# Patient Record
Sex: MALE | Race: WHITE | Employment: OTHER | URBAN - METROPOLITAN AREA
[De-identification: names, ages, dates, MRNs, and addresses within clinical notes are randomized per-mention and may not be internally consistent; named-entity substitution may affect disease eponyms.]

---

## 2017-01-04 RX ORDER — METOPROLOL SUCCINATE 25 MG/1
25 TABLET, EXTENDED RELEASE ORAL DAILY
Qty: 90 TABLET | Refills: 3 | Status: SHIPPED | OUTPATIENT
Start: 2017-01-04 | End: 2018-01-01

## 2017-01-04 RX ORDER — ATORVASTATIN CALCIUM 10 MG/1
10 TABLET, FILM COATED ORAL DAILY
Qty: 90 TABLET | Refills: 3 | Status: SHIPPED | OUTPATIENT
Start: 2017-01-04 | End: 2018-01-01

## 2017-03-16 ENCOUNTER — OFFICE VISIT (OUTPATIENT)
Dept: FAMILY MEDICINE CLINIC | Age: 67
End: 2017-03-16
Payer: MEDICARE

## 2017-03-16 VITALS — WEIGHT: 232 LBS | DIASTOLIC BLOOD PRESSURE: 90 MMHG | BODY MASS INDEX: 29.79 KG/M2 | SYSTOLIC BLOOD PRESSURE: 124 MMHG

## 2017-03-16 DIAGNOSIS — I10 ESSENTIAL HYPERTENSION: Primary | ICD-10-CM

## 2017-03-16 DIAGNOSIS — E78.00 PURE HYPERCHOLESTEROLEMIA: ICD-10-CM

## 2017-03-16 DIAGNOSIS — I25.83 CORONARY ARTERY DISEASE DUE TO LIPID RICH PLAQUE: ICD-10-CM

## 2017-03-16 DIAGNOSIS — I25.10 CORONARY ARTERY DISEASE DUE TO LIPID RICH PLAQUE: ICD-10-CM

## 2017-03-16 PROCEDURE — 1036F TOBACCO NON-USER: CPT | Performed by: FAMILY MEDICINE

## 2017-03-16 PROCEDURE — 3017F COLORECTAL CA SCREEN DOC REV: CPT | Performed by: FAMILY MEDICINE

## 2017-03-16 PROCEDURE — G8420 CALC BMI NORM PARAMETERS: HCPCS | Performed by: FAMILY MEDICINE

## 2017-03-16 PROCEDURE — G8598 ASA/ANTIPLAT THER USED: HCPCS | Performed by: FAMILY MEDICINE

## 2017-03-16 PROCEDURE — G8427 DOCREV CUR MEDS BY ELIG CLIN: HCPCS | Performed by: FAMILY MEDICINE

## 2017-03-16 PROCEDURE — 1123F ACP DISCUSS/DSCN MKR DOCD: CPT | Performed by: FAMILY MEDICINE

## 2017-03-16 PROCEDURE — 4040F PNEUMOC VAC/ADMIN/RCVD: CPT | Performed by: FAMILY MEDICINE

## 2017-03-16 PROCEDURE — 99214 OFFICE O/P EST MOD 30 MIN: CPT | Performed by: FAMILY MEDICINE

## 2017-03-16 PROCEDURE — G8484 FLU IMMUNIZE NO ADMIN: HCPCS | Performed by: FAMILY MEDICINE

## 2017-03-16 RX ORDER — ACETAMINOPHEN 160 MG
2000 TABLET,DISINTEGRATING ORAL DAILY
COMMUNITY

## 2017-03-16 ASSESSMENT — ENCOUNTER SYMPTOMS
DIARRHEA: 0
ABDOMINAL PAIN: 0
TROUBLE SWALLOWING: 0
NAUSEA: 0
FACIAL SWELLING: 0
SHORTNESS OF BREATH: 0
CHEST TIGHTNESS: 0
BLOOD IN STOOL: 0
VOMITING: 0
EYE DISCHARGE: 0
CONSTIPATION: 0
EYE REDNESS: 0
COUGH: 0

## 2017-04-19 ENCOUNTER — HOSPITAL ENCOUNTER (OUTPATIENT)
Dept: GENERAL RADIOLOGY | Age: 67
Discharge: HOME | End: 2017-04-19
Payer: MEDICARE

## 2017-04-19 ENCOUNTER — HOSPITAL ENCOUNTER (OUTPATIENT)
Age: 67
Discharge: HOME | End: 2017-04-19
Payer: MEDICARE

## 2017-04-19 DIAGNOSIS — I25.83 CORONARY ARTERY DISEASE DUE TO LIPID RICH PLAQUE: ICD-10-CM

## 2017-04-19 DIAGNOSIS — E78.5 HYPERLIPIDEMIA: ICD-10-CM

## 2017-04-19 DIAGNOSIS — Z95.1 S/P CABG X 2: ICD-10-CM

## 2017-04-19 DIAGNOSIS — I10 ESSENTIAL HYPERTENSION: ICD-10-CM

## 2017-04-19 DIAGNOSIS — I25.10 CORONARY ARTERY DISEASE DUE TO LIPID RICH PLAQUE: ICD-10-CM

## 2017-04-19 DIAGNOSIS — E55.9 VITAMIN D DEFICIENCY: ICD-10-CM

## 2017-04-19 DIAGNOSIS — Z98.890 H/O CAROTID ENDARTERECTOMY: ICD-10-CM

## 2017-04-19 LAB
ABSOLUTE EOS #: 0.2 K/UL (ref 0–0.4)
ABSOLUTE LYMPH #: 1.9 K/UL (ref 0.9–2.5)
ABSOLUTE MONO #: 0.5 K/UL (ref 0–1)
ALBUMIN SERPL-MCNC: 4.1 G/DL (ref 3.5–5.2)
ALBUMIN/GLOBULIN RATIO: ABNORMAL (ref 1–2.5)
ALP BLD-CCNC: 54 U/L (ref 40–129)
ALT SERPL-CCNC: 21 U/L (ref 5–41)
ANION GAP SERPL CALCULATED.3IONS-SCNC: 13 MMOL/L (ref 9–17)
AST SERPL-CCNC: 18 U/L
BASOPHILS # BLD: 0 % (ref 0–2)
BASOPHILS ABSOLUTE: 0 K/UL (ref 0–0.2)
BILIRUB SERPL-MCNC: 0.6 MG/DL (ref 0.3–1.2)
BUN BLDV-MCNC: 17 MG/DL (ref 8–23)
BUN/CREAT BLD: 19 (ref 9–20)
CALCIUM SERPL-MCNC: 9.1 MG/DL (ref 8.6–10.4)
CHLORIDE BLD-SCNC: 104 MMOL/L (ref 98–107)
CHOLESTEROL/HDL RATIO: 4.4
CHOLESTEROL: 208 MG/DL
CO2: 24 MMOL/L (ref 20–31)
CREAT SERPL-MCNC: 0.89 MG/DL (ref 0.7–1.2)
DIFFERENTIAL TYPE: YES
EOSINOPHILS RELATIVE PERCENT: 3 % (ref 0–5)
GFR AFRICAN AMERICAN: >60 ML/MIN
GFR NON-AFRICAN AMERICAN: >60 ML/MIN
GFR SERPL CREATININE-BSD FRML MDRD: ABNORMAL ML/MIN/{1.73_M2}
GFR SERPL CREATININE-BSD FRML MDRD: ABNORMAL ML/MIN/{1.73_M2}
GLUCOSE BLD-MCNC: 101 MG/DL (ref 70–99)
HCT VFR BLD CALC: 46.1 % (ref 41–53)
HDLC SERPL-MCNC: 47 MG/DL
HEMOGLOBIN: 15.8 G/DL (ref 13.5–17.5)
LDL CHOLESTEROL: 126 MG/DL (ref 0–130)
LYMPHOCYTES # BLD: 30 % (ref 13–44)
MCH RBC QN AUTO: 30.9 PG (ref 26–34)
MCHC RBC AUTO-ENTMCNC: 34.2 G/DL (ref 31–37)
MCV RBC AUTO: 90.4 FL (ref 80–100)
MONOCYTES # BLD: 7 % (ref 5–9)
PATIENT FASTING?: YES
PDW BLD-RTO: 12.8 % (ref 12.1–15.2)
PLATELET # BLD: 161 K/UL (ref 140–450)
PLATELET ESTIMATE: NORMAL
PMV BLD AUTO: NORMAL FL (ref 6–12)
POTASSIUM SERPL-SCNC: 4.3 MMOL/L (ref 3.7–5.3)
RBC # BLD: 5.1 M/UL (ref 4.5–5.9)
RBC # BLD: NORMAL 10*6/UL
SEG NEUTROPHILS: 60 % (ref 39–75)
SEGMENTED NEUTROPHILS ABSOLUTE COUNT: 3.8 K/UL (ref 2.1–6.5)
SODIUM BLD-SCNC: 141 MMOL/L (ref 135–144)
TOTAL PROTEIN: 6.9 G/DL (ref 6.4–8.3)
TRIGL SERPL-MCNC: 173 MG/DL
TSH SERPL DL<=0.05 MIU/L-ACNC: 0.56 MIU/L (ref 0.3–5)
VITAMIN D 25-HYDROXY: 40.3 NG/ML (ref 30–100)
VLDLC SERPL CALC-MCNC: ABNORMAL MG/DL (ref 1–30)
WBC # BLD: 6.3 K/UL (ref 3.5–11)
WBC # BLD: NORMAL 10*3/UL

## 2017-04-19 PROCEDURE — 93005 ELECTROCARDIOGRAM TRACING: CPT

## 2017-04-19 PROCEDURE — 80061 LIPID PANEL: CPT

## 2017-04-19 PROCEDURE — 80053 COMPREHEN METABOLIC PANEL: CPT

## 2017-04-19 PROCEDURE — 85025 COMPLETE CBC W/AUTO DIFF WBC: CPT

## 2017-04-19 PROCEDURE — 82306 VITAMIN D 25 HYDROXY: CPT

## 2017-04-19 PROCEDURE — 36415 COLL VENOUS BLD VENIPUNCTURE: CPT

## 2017-04-19 PROCEDURE — 71020 XR CHEST STANDARD TWO VW: CPT

## 2017-04-19 PROCEDURE — 84443 ASSAY THYROID STIM HORMONE: CPT

## 2017-04-20 LAB
EKG ATRIAL RATE: 70 BPM
EKG P AXIS: 68 DEGREES
EKG P-R INTERVAL: 142 MS
EKG Q-T INTERVAL: 424 MS
EKG QRS DURATION: 112 MS
EKG QTC CALCULATION (BAZETT): 457 MS
EKG R AXIS: 4 DEGREES
EKG T AXIS: 56 DEGREES
EKG VENTRICULAR RATE: 70 BPM

## 2017-04-24 ENCOUNTER — OFFICE VISIT (OUTPATIENT)
Dept: CARDIOLOGY CLINIC | Age: 67
End: 2017-04-24
Payer: MEDICARE

## 2017-04-24 VITALS
SYSTOLIC BLOOD PRESSURE: 180 MMHG | BODY MASS INDEX: 29.66 KG/M2 | OXYGEN SATURATION: 99 % | WEIGHT: 231 LBS | HEART RATE: 86 BPM | DIASTOLIC BLOOD PRESSURE: 106 MMHG

## 2017-04-24 DIAGNOSIS — I25.83 CORONARY ARTERY DISEASE DUE TO LIPID RICH PLAQUE: Primary | ICD-10-CM

## 2017-04-24 DIAGNOSIS — I25.10 CORONARY ARTERY DISEASE DUE TO LIPID RICH PLAQUE: Primary | ICD-10-CM

## 2017-04-24 DIAGNOSIS — Z98.890 H/O CAROTID ENDARTERECTOMY: ICD-10-CM

## 2017-04-24 DIAGNOSIS — E78.00 PURE HYPERCHOLESTEROLEMIA: ICD-10-CM

## 2017-04-24 DIAGNOSIS — E55.9 VITAMIN D DEFICIENCY: ICD-10-CM

## 2017-04-24 PROCEDURE — G8598 ASA/ANTIPLAT THER USED: HCPCS | Performed by: INTERNAL MEDICINE

## 2017-04-24 PROCEDURE — 3017F COLORECTAL CA SCREEN DOC REV: CPT | Performed by: INTERNAL MEDICINE

## 2017-04-24 PROCEDURE — G8420 CALC BMI NORM PARAMETERS: HCPCS | Performed by: INTERNAL MEDICINE

## 2017-04-24 PROCEDURE — 1036F TOBACCO NON-USER: CPT | Performed by: INTERNAL MEDICINE

## 2017-04-24 PROCEDURE — 4040F PNEUMOC VAC/ADMIN/RCVD: CPT | Performed by: INTERNAL MEDICINE

## 2017-04-24 PROCEDURE — 1123F ACP DISCUSS/DSCN MKR DOCD: CPT | Performed by: INTERNAL MEDICINE

## 2017-04-24 PROCEDURE — G8427 DOCREV CUR MEDS BY ELIG CLIN: HCPCS | Performed by: INTERNAL MEDICINE

## 2017-04-24 PROCEDURE — 99214 OFFICE O/P EST MOD 30 MIN: CPT | Performed by: INTERNAL MEDICINE

## 2017-04-24 PROCEDURE — 93308 TTE F-UP OR LMTD: CPT | Performed by: INTERNAL MEDICINE

## 2017-04-26 ENCOUNTER — OFFICE VISIT (OUTPATIENT)
Dept: FAMILY MEDICINE CLINIC | Age: 67
End: 2017-04-26
Payer: MEDICARE

## 2017-04-26 VITALS — DIASTOLIC BLOOD PRESSURE: 90 MMHG | SYSTOLIC BLOOD PRESSURE: 146 MMHG | BODY MASS INDEX: 29.66 KG/M2 | WEIGHT: 231 LBS

## 2017-04-26 DIAGNOSIS — M25.572 CHRONIC PAIN OF LEFT ANKLE: Primary | ICD-10-CM

## 2017-04-26 DIAGNOSIS — G89.29 CHRONIC PAIN OF LEFT ANKLE: Primary | ICD-10-CM

## 2017-04-26 PROCEDURE — 1036F TOBACCO NON-USER: CPT | Performed by: FAMILY MEDICINE

## 2017-04-26 PROCEDURE — G8420 CALC BMI NORM PARAMETERS: HCPCS | Performed by: FAMILY MEDICINE

## 2017-04-26 PROCEDURE — 3017F COLORECTAL CA SCREEN DOC REV: CPT | Performed by: FAMILY MEDICINE

## 2017-04-26 PROCEDURE — G8598 ASA/ANTIPLAT THER USED: HCPCS | Performed by: FAMILY MEDICINE

## 2017-04-26 PROCEDURE — 4040F PNEUMOC VAC/ADMIN/RCVD: CPT | Performed by: FAMILY MEDICINE

## 2017-04-26 PROCEDURE — 99213 OFFICE O/P EST LOW 20 MIN: CPT | Performed by: FAMILY MEDICINE

## 2017-04-26 PROCEDURE — G8427 DOCREV CUR MEDS BY ELIG CLIN: HCPCS | Performed by: FAMILY MEDICINE

## 2017-04-26 PROCEDURE — 1123F ACP DISCUSS/DSCN MKR DOCD: CPT | Performed by: FAMILY MEDICINE

## 2017-09-20 ENCOUNTER — HOSPITAL ENCOUNTER (OUTPATIENT)
Dept: GENERAL RADIOLOGY | Age: 67
Discharge: HOME | End: 2017-09-20
Payer: MEDICARE

## 2017-09-20 ENCOUNTER — OFFICE VISIT (OUTPATIENT)
Dept: FAMILY MEDICINE CLINIC | Age: 67
End: 2017-09-20
Payer: MEDICARE

## 2017-09-20 ENCOUNTER — HOSPITAL ENCOUNTER (OUTPATIENT)
Age: 67
Discharge: HOME | End: 2017-09-20
Payer: MEDICARE

## 2017-09-20 VITALS — WEIGHT: 226 LBS | SYSTOLIC BLOOD PRESSURE: 134 MMHG | DIASTOLIC BLOOD PRESSURE: 100 MMHG | BODY MASS INDEX: 29.02 KG/M2

## 2017-09-20 DIAGNOSIS — E78.00 PURE HYPERCHOLESTEROLEMIA: ICD-10-CM

## 2017-09-20 DIAGNOSIS — M25.532 LEFT WRIST PAIN: ICD-10-CM

## 2017-09-20 DIAGNOSIS — I25.83 CORONARY ARTERY DISEASE DUE TO LIPID RICH PLAQUE: ICD-10-CM

## 2017-09-20 DIAGNOSIS — I25.10 CORONARY ARTERY DISEASE DUE TO LIPID RICH PLAQUE: ICD-10-CM

## 2017-09-20 DIAGNOSIS — I10 ESSENTIAL HYPERTENSION: Primary | ICD-10-CM

## 2017-09-20 PROCEDURE — G8598 ASA/ANTIPLAT THER USED: HCPCS | Performed by: FAMILY MEDICINE

## 2017-09-20 PROCEDURE — 99214 OFFICE O/P EST MOD 30 MIN: CPT | Performed by: FAMILY MEDICINE

## 2017-09-20 PROCEDURE — 73110 X-RAY EXAM OF WRIST: CPT

## 2017-09-20 PROCEDURE — 4040F PNEUMOC VAC/ADMIN/RCVD: CPT | Performed by: FAMILY MEDICINE

## 2017-09-20 PROCEDURE — G8427 DOCREV CUR MEDS BY ELIG CLIN: HCPCS | Performed by: FAMILY MEDICINE

## 2017-09-20 PROCEDURE — 1036F TOBACCO NON-USER: CPT | Performed by: FAMILY MEDICINE

## 2017-09-20 PROCEDURE — 3017F COLORECTAL CA SCREEN DOC REV: CPT | Performed by: FAMILY MEDICINE

## 2017-09-20 PROCEDURE — G8417 CALC BMI ABV UP PARAM F/U: HCPCS | Performed by: FAMILY MEDICINE

## 2017-09-20 PROCEDURE — 1123F ACP DISCUSS/DSCN MKR DOCD: CPT | Performed by: FAMILY MEDICINE

## 2017-09-20 ASSESSMENT — ENCOUNTER SYMPTOMS
DIARRHEA: 0
EYE REDNESS: 0
CHEST TIGHTNESS: 0
TROUBLE SWALLOWING: 0
EYE DISCHARGE: 0
CONSTIPATION: 0
FACIAL SWELLING: 0
ABDOMINAL PAIN: 0
BLOOD IN STOOL: 0
VOMITING: 0
COUGH: 0
SHORTNESS OF BREATH: 0
NAUSEA: 0

## 2017-09-20 ASSESSMENT — PATIENT HEALTH QUESTIONNAIRE - PHQ9
1. LITTLE INTEREST OR PLEASURE IN DOING THINGS: 0
SUM OF ALL RESPONSES TO PHQ QUESTIONS 1-9: 0
SUM OF ALL RESPONSES TO PHQ9 QUESTIONS 1 & 2: 0
2. FEELING DOWN, DEPRESSED OR HOPELESS: 0

## 2017-09-28 ENCOUNTER — HOSPITAL ENCOUNTER (OUTPATIENT)
Age: 67
Discharge: HOME | End: 2017-09-28
Payer: MEDICARE

## 2017-10-05 ENCOUNTER — HOSPITAL ENCOUNTER (OUTPATIENT)
Age: 67
Discharge: HOME | End: 2017-10-05
Payer: COMMERCIAL

## 2018-01-01 ENCOUNTER — APPOINTMENT (OUTPATIENT)
Dept: GENERAL RADIOLOGY | Age: 68
End: 2018-01-01
Payer: MEDICARE

## 2018-01-01 ENCOUNTER — HOSPITAL ENCOUNTER (EMERGENCY)
Age: 68
Discharge: HOME | End: 2018-01-01
Attending: EMERGENCY MEDICINE
Payer: MEDICARE

## 2018-01-01 VITALS
TEMPERATURE: 97.2 F | WEIGHT: 220 LBS | SYSTOLIC BLOOD PRESSURE: 157 MMHG | BODY MASS INDEX: 28.25 KG/M2 | OXYGEN SATURATION: 95 % | DIASTOLIC BLOOD PRESSURE: 118 MMHG | HEART RATE: 101 BPM | RESPIRATION RATE: 24 BRPM

## 2018-01-01 DIAGNOSIS — J18.9 PNEUMONIA OF RIGHT LOWER LOBE DUE TO INFECTIOUS ORGANISM: Primary | ICD-10-CM

## 2018-01-01 DIAGNOSIS — R06.02 SHORTNESS OF BREATH: ICD-10-CM

## 2018-01-01 LAB
ABSOLUTE EOS #: 0.1 K/UL (ref 0–0.4)
ABSOLUTE IMMATURE GRANULOCYTE: ABNORMAL K/UL (ref 0–0.3)
ABSOLUTE LYMPH #: 1.8 K/UL (ref 1–4.8)
ABSOLUTE MONO #: 0.7 K/UL (ref 0–1)
ANION GAP SERPL CALCULATED.3IONS-SCNC: 15 MMOL/L (ref 9–17)
BASOPHILS # BLD: 0 % (ref 0–2)
BASOPHILS ABSOLUTE: 0 K/UL (ref 0–0.2)
BUN BLDV-MCNC: 26 MG/DL (ref 8–23)
BUN/CREAT BLD: 21 (ref 9–20)
CALCIUM SERPL-MCNC: 10 MG/DL (ref 8.6–10.4)
CHLORIDE BLD-SCNC: 103 MMOL/L (ref 98–107)
CO2: 26 MMOL/L (ref 20–31)
CREAT SERPL-MCNC: 1.22 MG/DL (ref 0.7–1.2)
DIFFERENTIAL TYPE: YES
EKG ATRIAL RATE: 105 BPM
EKG P AXIS: 66 DEGREES
EKG P-R INTERVAL: 152 MS
EKG Q-T INTERVAL: 378 MS
EKG QRS DURATION: 96 MS
EKG QTC CALCULATION (BAZETT): 499 MS
EKG R AXIS: 46 DEGREES
EKG T AXIS: -91 DEGREES
EKG VENTRICULAR RATE: 105 BPM
EOSINOPHILS RELATIVE PERCENT: 1 % (ref 0–5)
GFR AFRICAN AMERICAN: >60 ML/MIN
GFR NON-AFRICAN AMERICAN: 59 ML/MIN
GFR SERPL CREATININE-BSD FRML MDRD: ABNORMAL ML/MIN/{1.73_M2}
GFR SERPL CREATININE-BSD FRML MDRD: ABNORMAL ML/MIN/{1.73_M2}
GLUCOSE BLD-MCNC: 131 MG/DL (ref 70–99)
HCT VFR BLD CALC: 51.5 % (ref 41–53)
HEMOGLOBIN: 17.2 G/DL (ref 13.5–17.5)
IMMATURE GRANULOCYTES: ABNORMAL %
LYMPHOCYTES # BLD: 19 % (ref 13–44)
MCH RBC QN AUTO: 31.1 PG (ref 26–34)
MCHC RBC AUTO-ENTMCNC: 33.3 G/DL (ref 31–37)
MCV RBC AUTO: 93.4 FL (ref 80–100)
MONOCYTES # BLD: 8 % (ref 5–9)
PDW BLD-RTO: 14 % (ref 12.1–15.2)
PLATELET # BLD: 191 K/UL (ref 140–450)
PLATELET ESTIMATE: ABNORMAL
PMV BLD AUTO: ABNORMAL FL (ref 6–12)
POTASSIUM SERPL-SCNC: 4.2 MMOL/L (ref 3.7–5.3)
RBC # BLD: 5.51 M/UL (ref 4.5–5.9)
RBC # BLD: ABNORMAL 10*6/UL
SEG NEUTROPHILS: 72 % (ref 39–75)
SEGMENTED NEUTROPHILS ABSOLUTE COUNT: 6.9 K/UL (ref 2.1–6.5)
SODIUM BLD-SCNC: 144 MMOL/L (ref 135–144)
TROPONIN INTERP: NORMAL
TROPONIN T: <0.03 NG/ML
WBC # BLD: 9.6 K/UL (ref 3.5–11)
WBC # BLD: ABNORMAL 10*3/UL

## 2018-01-01 PROCEDURE — 96374 THER/PROPH/DIAG INJ IV PUSH: CPT

## 2018-01-01 PROCEDURE — 6370000000 HC RX 637 (ALT 250 FOR IP): Performed by: EMERGENCY MEDICINE

## 2018-01-01 PROCEDURE — 99285 EMERGENCY DEPT VISIT HI MDM: CPT

## 2018-01-01 PROCEDURE — 71045 X-RAY EXAM CHEST 1 VIEW: CPT

## 2018-01-01 PROCEDURE — 85025 COMPLETE CBC W/AUTO DIFF WBC: CPT

## 2018-01-01 PROCEDURE — 6360000002 HC RX W HCPCS: Performed by: EMERGENCY MEDICINE

## 2018-01-01 PROCEDURE — 93005 ELECTROCARDIOGRAM TRACING: CPT

## 2018-01-01 PROCEDURE — 80048 BASIC METABOLIC PNL TOTAL CA: CPT

## 2018-01-01 PROCEDURE — 84484 ASSAY OF TROPONIN QUANT: CPT

## 2018-01-01 PROCEDURE — 2580000003 HC RX 258: Performed by: EMERGENCY MEDICINE

## 2018-01-01 RX ORDER — DOXYCYCLINE 100 MG/1
100 CAPSULE ORAL 2 TIMES DAILY
Qty: 20 CAPSULE | Refills: 0 | Status: SHIPPED | OUTPATIENT
Start: 2018-01-01 | End: 2018-01-17 | Stop reason: ALTCHOICE

## 2018-01-01 RX ORDER — DOXYCYCLINE HYCLATE 100 MG
100 TABLET ORAL ONCE
Status: COMPLETED | OUTPATIENT
Start: 2018-01-01 | End: 2018-01-01

## 2018-01-01 RX ADMIN — DOXYCYCLINE HYCLATE 100 MG: 100 TABLET, COATED ORAL at 17:02

## 2018-01-01 RX ADMIN — CEFTRIAXONE 1 G: 1 INJECTION, POWDER, FOR SOLUTION INTRAMUSCULAR; INTRAVENOUS at 17:06

## 2018-01-01 NOTE — ED NOTES
Good verbal understanding of discharge instructions and scripts. Skin warm and dry. Respirations even and unlabored. Ambulates to [facility] ED exit with steady gait.        Nate Khanna RN  01/01/18 8260

## 2018-01-07 ENCOUNTER — HOSPITAL ENCOUNTER (INPATIENT)
Age: 68
LOS: 2 days | Discharge: HOME | DRG: 291 | End: 2018-01-09
Attending: EMERGENCY MEDICINE | Admitting: INTERNAL MEDICINE
Payer: MEDICARE

## 2018-01-07 ENCOUNTER — APPOINTMENT (OUTPATIENT)
Dept: CT IMAGING | Age: 68
DRG: 291 | End: 2018-01-07
Payer: MEDICARE

## 2018-01-07 ENCOUNTER — APPOINTMENT (OUTPATIENT)
Dept: GENERAL RADIOLOGY | Age: 68
DRG: 291 | End: 2018-01-07
Payer: MEDICARE

## 2018-01-07 DIAGNOSIS — I50.9 ACUTE CONGESTIVE HEART FAILURE, UNSPECIFIED CONGESTIVE HEART FAILURE TYPE: ICD-10-CM

## 2018-01-07 DIAGNOSIS — R06.02 SHORTNESS OF BREATH: ICD-10-CM

## 2018-01-07 DIAGNOSIS — M79.89 LEG SWELLING: Primary | ICD-10-CM

## 2018-01-07 PROBLEM — I50.21 ACUTE SYSTOLIC CONGESTIVE HEART FAILURE (HCC): Status: ACTIVE | Noted: 2018-01-07

## 2018-01-07 PROBLEM — I65.29 CAROTID STENOSIS: Status: ACTIVE | Noted: 2017-05-02

## 2018-01-07 PROBLEM — I50.21 CHF (CONGESTIVE HEART FAILURE), NYHA CLASS IV, ACUTE, SYSTOLIC (HCC): Status: ACTIVE | Noted: 2018-01-07

## 2018-01-07 LAB
ABSOLUTE EOS #: 0.1 K/UL (ref 0–0.4)
ABSOLUTE IMMATURE GRANULOCYTE: ABNORMAL K/UL (ref 0–0.3)
ABSOLUTE LYMPH #: 1.8 K/UL (ref 1–4.8)
ABSOLUTE MONO #: 0.4 K/UL (ref 0–1)
ALBUMIN SERPL-MCNC: 3.6 G/DL (ref 3.5–5.2)
ALBUMIN/GLOBULIN RATIO: ABNORMAL (ref 1–2.5)
ALP BLD-CCNC: 56 U/L (ref 40–129)
ALT SERPL-CCNC: 33 U/L (ref 5–41)
ANION GAP SERPL CALCULATED.3IONS-SCNC: 13 MMOL/L (ref 9–17)
AST SERPL-CCNC: 27 U/L
BASOPHILS # BLD: 0 % (ref 0–2)
BASOPHILS ABSOLUTE: 0 K/UL (ref 0–0.2)
BILIRUB SERPL-MCNC: 1.42 MG/DL (ref 0.3–1.2)
BNP INTERPRETATION: ABNORMAL
BUN BLDV-MCNC: 26 MG/DL (ref 8–23)
BUN/CREAT BLD: 23 (ref 9–20)
CALCIUM SERPL-MCNC: 9 MG/DL (ref 8.6–10.4)
CHLORIDE BLD-SCNC: 105 MMOL/L (ref 98–107)
CO2: 26 MMOL/L (ref 20–31)
CREAT SERPL-MCNC: 1.12 MG/DL (ref 0.7–1.2)
D-DIMER QUANTITATIVE: 1.37 MG/L FEU (ref 0–0.5)
DIFFERENTIAL TYPE: YES
EKG ATRIAL RATE: 91 BPM
EKG P AXIS: 75 DEGREES
EKG P-R INTERVAL: 140 MS
EKG Q-T INTERVAL: 466 MS
EKG QRS DURATION: 108 MS
EKG QTC CALCULATION (BAZETT): 573 MS
EKG R AXIS: 65 DEGREES
EKG T AXIS: -101 DEGREES
EKG VENTRICULAR RATE: 91 BPM
EOSINOPHILS RELATIVE PERCENT: 1 % (ref 0–5)
GFR AFRICAN AMERICAN: >60 ML/MIN
GFR NON-AFRICAN AMERICAN: >60 ML/MIN
GFR SERPL CREATININE-BSD FRML MDRD: ABNORMAL ML/MIN/{1.73_M2}
GFR SERPL CREATININE-BSD FRML MDRD: ABNORMAL ML/MIN/{1.73_M2}
GLUCOSE BLD-MCNC: 116 MG/DL (ref 70–99)
HCT VFR BLD CALC: 49.2 % (ref 41–53)
HEMOGLOBIN: 16.5 G/DL (ref 13.5–17.5)
IMMATURE GRANULOCYTES: ABNORMAL %
LYMPHOCYTES # BLD: 19 % (ref 13–44)
MCH RBC QN AUTO: 31.4 PG (ref 26–34)
MCHC RBC AUTO-ENTMCNC: 33.5 G/DL (ref 31–37)
MCV RBC AUTO: 93.8 FL (ref 80–100)
MONOCYTES # BLD: 5 % (ref 5–9)
PDW BLD-RTO: 14 % (ref 12.1–15.2)
PLATELET # BLD: 155 K/UL (ref 140–450)
PLATELET ESTIMATE: ABNORMAL
PMV BLD AUTO: ABNORMAL FL (ref 6–12)
POTASSIUM SERPL-SCNC: 3.8 MMOL/L (ref 3.7–5.3)
PRO-BNP: 4671 PG/ML
RBC # BLD: 5.24 M/UL (ref 4.5–5.9)
RBC # BLD: ABNORMAL 10*6/UL
SEG NEUTROPHILS: 75 % (ref 39–75)
SEGMENTED NEUTROPHILS ABSOLUTE COUNT: 7 K/UL (ref 2.1–6.5)
SODIUM BLD-SCNC: 144 MMOL/L (ref 135–144)
TOTAL PROTEIN: 5.7 G/DL (ref 6.4–8.3)
TROPONIN INTERP: NORMAL
TROPONIN INTERP: NORMAL
TROPONIN T: <0.03 NG/ML
TROPONIN T: <0.03 NG/ML
WBC # BLD: 9.3 K/UL (ref 3.5–11)
WBC # BLD: ABNORMAL 10*3/UL

## 2018-01-07 PROCEDURE — 6360000002 HC RX W HCPCS: Performed by: INTERNAL MEDICINE

## 2018-01-07 PROCEDURE — 71046 X-RAY EXAM CHEST 2 VIEWS: CPT

## 2018-01-07 PROCEDURE — 99285 EMERGENCY DEPT VISIT HI MDM: CPT

## 2018-01-07 PROCEDURE — 2580000003 HC RX 258: Performed by: INTERNAL MEDICINE

## 2018-01-07 PROCEDURE — 83880 ASSAY OF NATRIURETIC PEPTIDE: CPT

## 2018-01-07 PROCEDURE — 93005 ELECTROCARDIOGRAM TRACING: CPT

## 2018-01-07 PROCEDURE — 6360000004 HC RX CONTRAST MEDICATION: Performed by: EMERGENCY MEDICINE

## 2018-01-07 PROCEDURE — 6360000002 HC RX W HCPCS: Performed by: EMERGENCY MEDICINE

## 2018-01-07 PROCEDURE — 96374 THER/PROPH/DIAG INJ IV PUSH: CPT

## 2018-01-07 PROCEDURE — 36415 COLL VENOUS BLD VENIPUNCTURE: CPT

## 2018-01-07 PROCEDURE — 85025 COMPLETE CBC W/AUTO DIFF WBC: CPT

## 2018-01-07 PROCEDURE — 94761 N-INVAS EAR/PLS OXIMETRY MLT: CPT

## 2018-01-07 PROCEDURE — 1200000000 HC SEMI PRIVATE

## 2018-01-07 PROCEDURE — 80053 COMPREHEN METABOLIC PANEL: CPT

## 2018-01-07 PROCEDURE — 71275 CT ANGIOGRAPHY CHEST: CPT

## 2018-01-07 PROCEDURE — 85379 FIBRIN DEGRADATION QUANT: CPT

## 2018-01-07 PROCEDURE — 6370000000 HC RX 637 (ALT 250 FOR IP): Performed by: INTERNAL MEDICINE

## 2018-01-07 PROCEDURE — 84484 ASSAY OF TROPONIN QUANT: CPT

## 2018-01-07 RX ORDER — DOXYCYCLINE 100 MG/1
100 CAPSULE ORAL 2 TIMES DAILY
Status: DISCONTINUED | OUTPATIENT
Start: 2018-01-07 | End: 2018-01-07

## 2018-01-07 RX ORDER — ONDANSETRON 2 MG/ML
4 INJECTION INTRAMUSCULAR; INTRAVENOUS EVERY 6 HOURS PRN
Status: DISCONTINUED | OUTPATIENT
Start: 2018-01-07 | End: 2018-01-09 | Stop reason: HOSPADM

## 2018-01-07 RX ORDER — M-VIT,TX,IRON,MINS/CALC/FOLIC 27MG-0.4MG
1 TABLET ORAL DAILY
Status: DISCONTINUED | OUTPATIENT
Start: 2018-01-07 | End: 2018-01-09 | Stop reason: HOSPADM

## 2018-01-07 RX ORDER — FUROSEMIDE 10 MG/ML
20 INJECTION INTRAMUSCULAR; INTRAVENOUS 2 TIMES DAILY
Status: DISCONTINUED | OUTPATIENT
Start: 2018-01-07 | End: 2018-01-09

## 2018-01-07 RX ORDER — DOXYCYCLINE HYCLATE 100 MG
100 TABLET ORAL EVERY 12 HOURS SCHEDULED
Status: DISCONTINUED | OUTPATIENT
Start: 2018-01-07 | End: 2018-01-09 | Stop reason: HOSPADM

## 2018-01-07 RX ORDER — SODIUM CHLORIDE 0.9 % (FLUSH) 0.9 %
10 SYRINGE (ML) INJECTION PRN
Status: DISCONTINUED | OUTPATIENT
Start: 2018-01-07 | End: 2018-01-09 | Stop reason: HOSPADM

## 2018-01-07 RX ORDER — POTASSIUM CHLORIDE 750 MG/1
40 TABLET, FILM COATED, EXTENDED RELEASE ORAL ONCE
Status: COMPLETED | OUTPATIENT
Start: 2018-01-07 | End: 2018-01-07

## 2018-01-07 RX ORDER — ASPIRIN 81 MG/1
81 TABLET, CHEWABLE ORAL DAILY
Status: DISCONTINUED | OUTPATIENT
Start: 2018-01-07 | End: 2018-01-09 | Stop reason: HOSPADM

## 2018-01-07 RX ORDER — ACETAMINOPHEN 325 MG/1
650 TABLET ORAL EVERY 4 HOURS PRN
Status: DISCONTINUED | OUTPATIENT
Start: 2018-01-07 | End: 2018-01-09 | Stop reason: HOSPADM

## 2018-01-07 RX ORDER — FUROSEMIDE 10 MG/ML
20 INJECTION INTRAMUSCULAR; INTRAVENOUS ONCE
Status: COMPLETED | OUTPATIENT
Start: 2018-01-07 | End: 2018-01-07

## 2018-01-07 RX ORDER — SODIUM CHLORIDE 0.9 % (FLUSH) 0.9 %
10 SYRINGE (ML) INJECTION EVERY 12 HOURS SCHEDULED
Status: DISCONTINUED | OUTPATIENT
Start: 2018-01-07 | End: 2018-01-09 | Stop reason: HOSPADM

## 2018-01-07 RX ADMIN — FUROSEMIDE 20 MG: 10 INJECTION, SOLUTION INTRAMUSCULAR; INTRAVENOUS at 18:32

## 2018-01-07 RX ADMIN — Medication 10 ML: at 20:12

## 2018-01-07 RX ADMIN — Medication 10 ML: at 18:32

## 2018-01-07 RX ADMIN — POTASSIUM CHLORIDE 40 MEQ: 750 TABLET, FILM COATED, EXTENDED RELEASE ORAL at 18:32

## 2018-01-07 RX ADMIN — IOVERSOL 100 ML: 741 INJECTION INTRA-ARTERIAL; INTRAVENOUS at 10:59

## 2018-01-07 RX ADMIN — FUROSEMIDE 20 MG: 10 INJECTION, SOLUTION INTRAMUSCULAR; INTRAVENOUS at 11:26

## 2018-01-07 RX ADMIN — ENOXAPARIN SODIUM 40 MG: 40 INJECTION SUBCUTANEOUS at 15:54

## 2018-01-07 RX ADMIN — DOXYCYCLINE HYCLATE 100 MG: 100 TABLET, COATED ORAL at 20:18

## 2018-01-07 RX ADMIN — MULTIPLE VITAMINS W/ MINERALS TAB 1 TABLET: TAB at 15:54

## 2018-01-07 ASSESSMENT — PAIN DESCRIPTION - ONSET: ONSET: ON-GOING

## 2018-01-07 ASSESSMENT — PAIN DESCRIPTION - ORIENTATION: ORIENTATION: LEFT;RIGHT

## 2018-01-07 ASSESSMENT — PAIN DESCRIPTION - PAIN TYPE: TYPE: ACUTE PAIN

## 2018-01-07 ASSESSMENT — PAIN SCALES - GENERAL: PAINLEVEL_OUTOF10: 6

## 2018-01-07 ASSESSMENT — PAIN DESCRIPTION - FREQUENCY: FREQUENCY: CONTINUOUS

## 2018-01-07 ASSESSMENT — PAIN DESCRIPTION - DESCRIPTORS: DESCRIPTORS: CONSTANT

## 2018-01-07 ASSESSMENT — PAIN DESCRIPTION - LOCATION: LOCATION: FOOT

## 2018-01-07 ASSESSMENT — PAIN DESCRIPTION - PROGRESSION: CLINICAL_PROGRESSION: GRADUALLY WORSENING

## 2018-01-07 NOTE — PROGRESS NOTES
Pt arrives to room 269 via w/c from ED. Oriented to room and call light. Vitals complete with pulse ox 97% on room air.

## 2018-01-07 NOTE — ED PROVIDER NOTES
branches limited by suboptimal bolus    timing. Small to moderate bilateral pleural effusions with passive atelectasis. Additional areas of groundglass opacity bilaterally may represent mild edema or    less likely, infectious/inflammatory pneumonitis. No dense airspace    consolidation      Mild cardiomegaly. XR CHEST STANDARD (2 VW)   Final Result      1. Small-moderate size bilateral pleural effusions, new or increased on the    left compared to 1/1/2018 and fairly similar on the right compared to that    prior exam.      2. Mild bibasilar atelectasis, left worse than right. Mild superimposed    airspace disease in the left base is not excluded. 3. Stable cardiomegaly with pulmonary venous hypertension but no overt    pulmonary edema. 4. Mild-moderate bilateral bronchial wall/peribronchial thickening. Labs  Labs Reviewed   CBC WITH AUTO DIFFERENTIAL - Abnormal; Notable for the following:        Result Value    Segs Absolute 7.00 (*)     All other components within normal limits   COMPREHENSIVE METABOLIC PANEL - Abnormal; Notable for the following:     Glucose 116 (*)     BUN 26 (*)     Bun/Cre Ratio 23 (*)     Total Bilirubin 1.42 (*)     Total Protein 5.7 (*)     All other components within normal limits   D-DIMER, QUANTITATIVE - Abnormal; Notable for the following:     D-Dimer, Quant 1.37 (*)     All other components within normal limits   BRAIN NATRIURETIC PEPTIDE - Abnormal; Notable for the following:     Pro-BNP 4,671 (*)     All other components within normal limits   TROPONIN             Summation      Patient Course: Patient admitted for further evaluation and treatment.     ED Medications administered this visit:    Medications   ioversol (OPTIRAY) 74 % injection 100 mL (100 mLs Intravenous Given 1/7/18 1059)   furosemide (LASIX) injection 20 mg (20 mg Intravenous Given 1/7/18 1126)       New Prescriptions from this visit:    New Prescriptions    No medications on file Follow-up:  No follow-up provider specified. Final Impression:   1. Leg swelling    2. Shortness of breath    3.  Acute congestive heart failure, unspecified congestive heart failure type ([address]               (Please note that portions of this note were completed with a voice recognition program.  Efforts were made to edit the dictations but occasionally words are mis-transcribed.)          Cristiane Salgado MD  01/07/18 7179

## 2018-01-07 NOTE — PLAN OF CARE
Problem: Activity:  Goal: Ability to tolerate increased activity will improve  Ability to tolerate increased activity will improve   Outcome: Ongoing  Becomes short of breath after ambulating to bathroom and back to bed.

## 2018-01-07 NOTE — PROGRESS NOTES
Educated pt on indications and use of incentive spirometry. Pt demonstrates good effort and technique, achieving over predicted value, see flowsheet. Encouraged pt to use independently Q2 hours while awake, pt verbalizes understanding.

## 2018-01-08 LAB
ABSOLUTE EOS #: 0.2 K/UL (ref 0–0.4)
ABSOLUTE IMMATURE GRANULOCYTE: NORMAL K/UL (ref 0–0.3)
ABSOLUTE LYMPH #: 2.3 K/UL (ref 1–4.8)
ABSOLUTE MONO #: 0.6 K/UL (ref 0–1)
ANION GAP SERPL CALCULATED.3IONS-SCNC: 14 MMOL/L (ref 9–17)
BASOPHILS # BLD: 1 % (ref 0–2)
BASOPHILS ABSOLUTE: 0 K/UL (ref 0–0.2)
BUN BLDV-MCNC: 26 MG/DL (ref 8–23)
BUN/CREAT BLD: 20 (ref 9–20)
CALCIUM SERPL-MCNC: 8.9 MG/DL (ref 8.6–10.4)
CHLORIDE BLD-SCNC: 103 MMOL/L (ref 98–107)
CO2: 28 MMOL/L (ref 20–31)
CREAT SERPL-MCNC: 1.32 MG/DL (ref 0.7–1.2)
DIFFERENTIAL TYPE: YES
EOSINOPHILS RELATIVE PERCENT: 3 % (ref 0–5)
GFR AFRICAN AMERICAN: >60 ML/MIN
GFR NON-AFRICAN AMERICAN: 54 ML/MIN
GFR SERPL CREATININE-BSD FRML MDRD: ABNORMAL ML/MIN/{1.73_M2}
GFR SERPL CREATININE-BSD FRML MDRD: ABNORMAL ML/MIN/{1.73_M2}
GLUCOSE BLD-MCNC: 111 MG/DL (ref 70–99)
HCT VFR BLD CALC: 50 % (ref 41–53)
HEMOGLOBIN: 16.6 G/DL (ref 13.5–17.5)
IMMATURE GRANULOCYTES: NORMAL %
LV EF: 28 %
LVEF MODALITY: NORMAL
LYMPHOCYTES # BLD: 28 % (ref 13–44)
MCH RBC QN AUTO: 31.3 PG (ref 26–34)
MCHC RBC AUTO-ENTMCNC: 33.3 G/DL (ref 31–37)
MCV RBC AUTO: 94.2 FL (ref 80–100)
MONOCYTES # BLD: 8 % (ref 5–9)
PDW BLD-RTO: 14.1 % (ref 12.1–15.2)
PLATELET # BLD: 163 K/UL (ref 140–450)
PLATELET ESTIMATE: NORMAL
PMV BLD AUTO: NORMAL FL (ref 6–12)
POTASSIUM SERPL-SCNC: 4.2 MMOL/L (ref 3.7–5.3)
RBC # BLD: 5.31 M/UL (ref 4.5–5.9)
RBC # BLD: NORMAL 10*6/UL
SEG NEUTROPHILS: 60 % (ref 39–75)
SEGMENTED NEUTROPHILS ABSOLUTE COUNT: 5.2 K/UL (ref 2.1–6.5)
SODIUM BLD-SCNC: 145 MMOL/L (ref 135–144)
TROPONIN INTERP: NORMAL
TROPONIN T: <0.03 NG/ML
WBC # BLD: 8.4 K/UL (ref 3.5–11)
WBC # BLD: NORMAL 10*3/UL

## 2018-01-08 PROCEDURE — 99222 1ST HOSP IP/OBS MODERATE 55: CPT | Performed by: INTERNAL MEDICINE

## 2018-01-08 PROCEDURE — 1200000000 HC SEMI PRIVATE

## 2018-01-08 PROCEDURE — 6370000000 HC RX 637 (ALT 250 FOR IP): Performed by: INTERNAL MEDICINE

## 2018-01-08 PROCEDURE — 84484 ASSAY OF TROPONIN QUANT: CPT

## 2018-01-08 PROCEDURE — 6360000002 HC RX W HCPCS: Performed by: INTERNAL MEDICINE

## 2018-01-08 PROCEDURE — 36415 COLL VENOUS BLD VENIPUNCTURE: CPT

## 2018-01-08 PROCEDURE — 93306 TTE W/DOPPLER COMPLETE: CPT

## 2018-01-08 PROCEDURE — 2580000003 HC RX 258: Performed by: INTERNAL MEDICINE

## 2018-01-08 PROCEDURE — 94761 N-INVAS EAR/PLS OXIMETRY MLT: CPT

## 2018-01-08 PROCEDURE — 85025 COMPLETE CBC W/AUTO DIFF WBC: CPT

## 2018-01-08 PROCEDURE — 80048 BASIC METABOLIC PNL TOTAL CA: CPT

## 2018-01-08 RX ORDER — AMINOPHYLLINE DIHYDRATE 25 MG/ML
100 INJECTION, SOLUTION INTRAVENOUS
Status: ACTIVE | OUTPATIENT
Start: 2018-01-08 | End: 2018-01-08

## 2018-01-08 RX ORDER — HYDRALAZINE HYDROCHLORIDE 25 MG/1
12.5 TABLET, FILM COATED ORAL EVERY 8 HOURS SCHEDULED
Status: DISCONTINUED | OUTPATIENT
Start: 2018-01-08 | End: 2018-01-09 | Stop reason: HOSPADM

## 2018-01-08 RX ORDER — SODIUM CHLORIDE 0.9 % (FLUSH) 0.9 %
10 SYRINGE (ML) INJECTION PRN
Status: DISPENSED | OUTPATIENT
Start: 2018-01-08 | End: 2018-01-09

## 2018-01-08 RX ORDER — ATORVASTATIN CALCIUM 20 MG/1
40 TABLET, FILM COATED ORAL NIGHTLY
Status: DISCONTINUED | OUTPATIENT
Start: 2018-01-08 | End: 2018-01-09 | Stop reason: HOSPADM

## 2018-01-08 RX ORDER — HYDRALAZINE HYDROCHLORIDE 25 MG/1
25 TABLET, FILM COATED ORAL EVERY 8 HOURS SCHEDULED
Status: DISCONTINUED | OUTPATIENT
Start: 2018-01-08 | End: 2018-01-08

## 2018-01-08 RX ORDER — CARVEDILOL 3.12 MG/1
3.12 TABLET ORAL 2 TIMES DAILY WITH MEALS
Status: DISCONTINUED | OUTPATIENT
Start: 2018-01-08 | End: 2018-01-09

## 2018-01-08 RX ORDER — AMINOPHYLLINE DIHYDRATE 25 MG/ML
50 INJECTION, SOLUTION INTRAVENOUS
Status: ACTIVE | OUTPATIENT
Start: 2018-01-08 | End: 2018-01-08

## 2018-01-08 RX ORDER — ISOSORBIDE MONONITRATE 30 MG/1
30 TABLET, EXTENDED RELEASE ORAL DAILY
Status: DISCONTINUED | OUTPATIENT
Start: 2018-01-08 | End: 2018-01-09 | Stop reason: HOSPADM

## 2018-01-08 RX ADMIN — FUROSEMIDE 20 MG: 10 INJECTION, SOLUTION INTRAMUSCULAR; INTRAVENOUS at 09:34

## 2018-01-08 RX ADMIN — DOXYCYCLINE HYCLATE 100 MG: 100 TABLET, COATED ORAL at 09:34

## 2018-01-08 RX ADMIN — ISOSORBIDE MONONITRATE 30 MG: 30 TABLET, EXTENDED RELEASE ORAL at 15:25

## 2018-01-08 RX ADMIN — DOXYCYCLINE HYCLATE 100 MG: 100 TABLET, COATED ORAL at 21:24

## 2018-01-08 RX ADMIN — MULTIPLE VITAMINS W/ MINERALS TAB 1 TABLET: TAB at 09:34

## 2018-01-08 RX ADMIN — HYDRALAZINE HYDROCHLORIDE 25 MG: 25 TABLET, FILM COATED ORAL at 15:25

## 2018-01-08 RX ADMIN — HYDRALAZINE HYDROCHLORIDE 12.5 MG: 25 TABLET, FILM COATED ORAL at 21:24

## 2018-01-08 RX ADMIN — Medication 10 ML: at 17:48

## 2018-01-08 RX ADMIN — ATORVASTATIN CALCIUM 40 MG: 20 TABLET, FILM COATED ORAL at 21:24

## 2018-01-08 RX ADMIN — ASPIRIN 81 MG 81 MG: 81 TABLET ORAL at 09:34

## 2018-01-08 RX ADMIN — Medication 10 ML: at 09:33

## 2018-01-08 RX ADMIN — Medication 10 ML: at 21:24

## 2018-01-08 RX ADMIN — ENOXAPARIN SODIUM 40 MG: 40 INJECTION SUBCUTANEOUS at 09:34

## 2018-01-08 RX ADMIN — CHOLECALCIFEROL TAB 25 MCG (1000 UNIT) 2000 UNITS: 25 TAB at 09:34

## 2018-01-08 RX ADMIN — CARVEDILOL 3.12 MG: 3.12 TABLET, FILM COATED ORAL at 17:48

## 2018-01-08 RX ADMIN — FUROSEMIDE 20 MG: 10 INJECTION, SOLUTION INTRAMUSCULAR; INTRAVENOUS at 17:48

## 2018-01-08 NOTE — H&P
History & Physical    Patient:  Loan Yeh  YOB: 1950  Date of Service: 1/8/2018  MRN: [id number]   Acct:   [de-identified]   Primary Care Physician: Sallie Conde MD    Chief Complaint:   Chief Complaint   Patient presents with    Leg Swelling     bilateral swelling of feet       History of Present Illness: The patient is a 79 y.o. male presented to the emergency room complaining of worsening shortness of breath, increased swelling in both legs for the past 2 days. He describes shortness of breath severe, worse with exertion, he had associated gurgling in his chest.  He denies having chest pains, wheezing, he had no fevers or chills, no headaches. He was in the emergency room on 1/1/18 for shortness of breath and cough. He was diagnosed with right lower lobe pneumonia and discharged home with doxycycline. He didn't improve much with oral antibiotics and yesterday morning he shortness of breath was particularly worse, his legs and feet were so swollen that he developed pain with just standing up. At that point he decided to come back to the emergency room for evaluation  His vitals were stable, he was 95% on room air. D-dimer was elevated 1.37, proBNP 4,671, troponin was negative. EKG revealed normal sinus rhythm with PVCs and nonspecific ST and T wave changes. Chemistry panel was unremarkable except slightly elevated BUN, CBC was normal.  Due to elevated d-dimer at the patient had CTA chest on. This was negative for pulmonary embolism, revealed mild cardiomegaly, bilateral patchy glass opacities and small to moderate bilateral pleural effusions, no dense airspace consolidation was present. The patient was suspected to have CHF exacerbation, was treated with IV Lasix in the emergency room and admitted for further management. He has a history of severe coronary artery disease, carotid stenosis, he underwent CABG and right CEA June 2015.   Chico Brewster does not take stockings, aspirin, beta blockers or ACE inhibitor's at home. He believes in holistic medicine. His last echocardiogram was done 6/1/16, his ejection fraction was 40-45%, he had mild MR and monitored LVH  This morning patient feels much better, shortness of breath almost resolved, swelling in both legs also improved    Past Medical History:        Diagnosis Date    Carotid stenosis 5/2/2017    Coronary artery disease due to lipid rich plaque 8/10/2015    Essential hypertension 8/10/2015    Hyperlipidemia 8/10/2015       Past Surgical History:        Procedure Laterality Date    CARDIAC CATHETERIZATION Left 05/06/15    Dr. Carl Hernandez CAD, Diffuse 90% disease in the proximal left anterior descending coronary artery. 80% disease in the distal circumflex. 80% disease in small nondominant right coronary artery. Borderline normal LV function, ejection fraction of 50%    CAROTID ENDARTERECTOMY Right     CORONARY ARTERY BYPASS GRAFT  6/10/15    Dr Isabella Vang x2    KNEE SURGERY Left 10/14/15    Lt knee bursa repair    ROTATOR CUFF REPAIR      right       Home Medications:   No current facility-administered medications on file prior to encounter.       Current Outpatient Prescriptions on File Prior to Encounter   Medication Sig Dispense Refill    doxycycline monohydrate (MONODOX) 100 MG capsule Take 1 capsule by mouth 2 times daily 20 capsule 0    Cholecalciferol (VITAMIN D3) 2000 UNITS CAPS Take 2,000 Units by mouth daily      BEE POLLEN PO Take by mouth      Fish Oil-Cholecalciferol (FISH OIL + D3 PO) Take by mouth      Flaxseed, Linseed, (FLAX SEED OIL PO) Take by mouth      Red Yeast Rice Extract (RED YEAST RICE PO) Take by mouth daily Doesn't know dose      Multiple Vitamins-Minerals (THERAPEUTIC MULTIVITAMIN-MINERALS) tablet Take 1 tablet by mouth daily      Ascorbic Acid (VITAMIN C) 500 MG tablet Take 500 mg by mouth daily      vitamin E 1000 UNITS capsule Take 1,000 Units by mouth daily      aspirin 81 MG

## 2018-01-08 NOTE — PROGRESS NOTES
Quality flow rounds held on 1/8/18     Eduarda Funez is admitted for  Acute systolic congestive heart failure ([facility] [address] Length of stay 1. Education:    Needed Education: CHF      Do you have any questions regarding your plan of care while at the hospital? None at this time. Planned Disposition:               [x]  Home when able                [] Swing Bed                [] ECF/SNF               [] Other/TBD    Barriers to Discharge:    Can you afford your medications? Yes. Do you have transportation to follow up appointments? Yes. Do you need any new equipment at home? None. Current equipment includes   N/A    Do you or your family have any questions or concerns we haven't already discussed? None at this time.

## 2018-01-09 ENCOUNTER — APPOINTMENT (OUTPATIENT)
Dept: NUCLEAR MEDICINE | Age: 68
DRG: 291 | End: 2018-01-09
Payer: MEDICARE

## 2018-01-09 ENCOUNTER — TELEPHONE (OUTPATIENT)
Dept: CARDIOLOGY CLINIC | Age: 68
End: 2018-01-09

## 2018-01-09 VITALS
BODY MASS INDEX: 26.61 KG/M2 | WEIGHT: 214 LBS | HEIGHT: 75 IN | TEMPERATURE: 97.9 F | SYSTOLIC BLOOD PRESSURE: 100 MMHG | OXYGEN SATURATION: 96 % | HEART RATE: 91 BPM | RESPIRATION RATE: 18 BRPM | DIASTOLIC BLOOD PRESSURE: 76 MMHG

## 2018-01-09 DIAGNOSIS — I25.10 CORONARY ARTERY DISEASE DUE TO LIPID RICH PLAQUE: ICD-10-CM

## 2018-01-09 DIAGNOSIS — I25.83 CORONARY ARTERY DISEASE DUE TO LIPID RICH PLAQUE: ICD-10-CM

## 2018-01-09 DIAGNOSIS — R00.0 TACHYCARDIA: Primary | ICD-10-CM

## 2018-01-09 DIAGNOSIS — I10 ESSENTIAL HYPERTENSION: ICD-10-CM

## 2018-01-09 LAB
ANION GAP SERPL CALCULATED.3IONS-SCNC: 10 MMOL/L (ref 9–17)
BUN BLDV-MCNC: 30 MG/DL (ref 8–23)
BUN/CREAT BLD: 22 (ref 9–20)
CALCIUM SERPL-MCNC: 8.9 MG/DL (ref 8.6–10.4)
CHLORIDE BLD-SCNC: 105 MMOL/L (ref 98–107)
CO2: 29 MMOL/L (ref 20–31)
CREAT SERPL-MCNC: 1.37 MG/DL (ref 0.7–1.2)
GFR AFRICAN AMERICAN: >60 ML/MIN
GFR NON-AFRICAN AMERICAN: 52 ML/MIN
GFR SERPL CREATININE-BSD FRML MDRD: ABNORMAL ML/MIN/{1.73_M2}
GFR SERPL CREATININE-BSD FRML MDRD: ABNORMAL ML/MIN/{1.73_M2}
GLUCOSE BLD-MCNC: 96 MG/DL (ref 70–99)
POTASSIUM SERPL-SCNC: 4.2 MMOL/L (ref 3.7–5.3)
SODIUM BLD-SCNC: 144 MMOL/L (ref 135–144)

## 2018-01-09 PROCEDURE — 6360000002 HC RX W HCPCS: Performed by: INTERNAL MEDICINE

## 2018-01-09 PROCEDURE — 2580000003 HC RX 258: Performed by: INTERNAL MEDICINE

## 2018-01-09 PROCEDURE — 78452 HT MUSCLE IMAGE SPECT MULT: CPT

## 2018-01-09 PROCEDURE — 99232 SBSQ HOSP IP/OBS MODERATE 35: CPT | Performed by: INTERNAL MEDICINE

## 2018-01-09 PROCEDURE — 93017 CV STRESS TEST TRACING ONLY: CPT

## 2018-01-09 PROCEDURE — A9500 TC99M SESTAMIBI: HCPCS | Performed by: INTERNAL MEDICINE

## 2018-01-09 PROCEDURE — 94761 N-INVAS EAR/PLS OXIMETRY MLT: CPT

## 2018-01-09 PROCEDURE — 6370000000 HC RX 637 (ALT 250 FOR IP): Performed by: INTERNAL MEDICINE

## 2018-01-09 PROCEDURE — 36415 COLL VENOUS BLD VENIPUNCTURE: CPT

## 2018-01-09 PROCEDURE — 80048 BASIC METABOLIC PNL TOTAL CA: CPT

## 2018-01-09 PROCEDURE — 3430000000 HC RX DIAGNOSTIC RADIOPHARMACEUTICAL: Performed by: INTERNAL MEDICINE

## 2018-01-09 RX ORDER — ATORVASTATIN CALCIUM 40 MG/1
40 TABLET, FILM COATED ORAL NIGHTLY
Qty: 30 TABLET | Refills: 3 | Status: SHIPPED | OUTPATIENT
Start: 2018-01-09 | End: 2018-02-12 | Stop reason: SDUPTHER

## 2018-01-09 RX ORDER — POTASSIUM CHLORIDE 750 MG/1
10 TABLET, EXTENDED RELEASE ORAL DAILY
Qty: 60 TABLET | Refills: 3 | Status: SHIPPED | OUTPATIENT
Start: 2018-01-09 | End: 2018-02-12 | Stop reason: SDUPTHER

## 2018-01-09 RX ORDER — AMINOPHYLLINE DIHYDRATE 25 MG/ML
100 INJECTION, SOLUTION INTRAVENOUS
Status: DISCONTINUED | OUTPATIENT
Start: 2018-01-09 | End: 2018-01-09 | Stop reason: HOSPADM

## 2018-01-09 RX ORDER — ISOSORBIDE MONONITRATE 30 MG/1
30 TABLET, EXTENDED RELEASE ORAL DAILY
Qty: 30 TABLET | Refills: 3 | Status: SHIPPED | OUTPATIENT
Start: 2018-01-10 | End: 2018-02-12 | Stop reason: SDUPTHER

## 2018-01-09 RX ORDER — CARVEDILOL 12.5 MG/1
6.25 TABLET ORAL 2 TIMES DAILY WITH MEALS
Status: DISCONTINUED | OUTPATIENT
Start: 2018-01-09 | End: 2018-01-09 | Stop reason: HOSPADM

## 2018-01-09 RX ORDER — AMINOPHYLLINE DIHYDRATE 25 MG/ML
50 INJECTION, SOLUTION INTRAVENOUS
Status: DISCONTINUED | OUTPATIENT
Start: 2018-01-09 | End: 2018-01-09 | Stop reason: HOSPADM

## 2018-01-09 RX ORDER — CARVEDILOL 6.25 MG/1
6.25 TABLET ORAL 2 TIMES DAILY WITH MEALS
Qty: 60 TABLET | Refills: 3 | Status: SHIPPED | OUTPATIENT
Start: 2018-01-09 | End: 2018-02-12 | Stop reason: SDUPTHER

## 2018-01-09 RX ORDER — FUROSEMIDE 20 MG/1
20 TABLET ORAL DAILY
Qty: 60 TABLET | Refills: 3 | Status: SHIPPED | OUTPATIENT
Start: 2018-01-09 | End: 2018-02-12 | Stop reason: SDUPTHER

## 2018-01-09 RX ORDER — HYDRALAZINE HYDROCHLORIDE 25 MG/1
12.5 TABLET, FILM COATED ORAL EVERY 8 HOURS SCHEDULED
Qty: 90 TABLET | Refills: 3 | Status: SHIPPED | OUTPATIENT
Start: 2018-01-09 | End: 2018-03-21 | Stop reason: SDUPTHER

## 2018-01-09 RX ADMIN — Medication 10 ML: at 08:33

## 2018-01-09 RX ADMIN — ASPIRIN 81 MG 81 MG: 81 TABLET ORAL at 11:32

## 2018-01-09 RX ADMIN — Medication 10 ML: at 11:31

## 2018-01-09 RX ADMIN — DOXYCYCLINE HYCLATE 100 MG: 100 TABLET, COATED ORAL at 11:32

## 2018-01-09 RX ADMIN — HYDRALAZINE HYDROCHLORIDE 12.5 MG: 25 TABLET, FILM COATED ORAL at 06:16

## 2018-01-09 RX ADMIN — CHOLECALCIFEROL TAB 25 MCG (1000 UNIT) 2000 UNITS: 25 TAB at 11:32

## 2018-01-09 RX ADMIN — ISOSORBIDE MONONITRATE 30 MG: 30 TABLET, EXTENDED RELEASE ORAL at 11:32

## 2018-01-09 RX ADMIN — CARVEDILOL 6.25 MG: 12.5 TABLET, FILM COATED ORAL at 11:31

## 2018-01-09 RX ADMIN — TETRAKIS(2-METHOXYISOBUTYLISOCYANIDE)COPPER(I) TETRAFLUOROBORATE 10 MILLICURIE: 1 INJECTION, POWDER, LYOPHILIZED, FOR SOLUTION INTRAVENOUS at 07:10

## 2018-01-09 RX ADMIN — TETRAKIS(2-METHOXYISOBUTYLISOCYANIDE)COPPER(I) TETRAFLUOROBORATE 30 MILLICURIE: 1 INJECTION, POWDER, LYOPHILIZED, FOR SOLUTION INTRAVENOUS at 08:32

## 2018-01-09 RX ADMIN — ENOXAPARIN SODIUM 40 MG: 40 INJECTION SUBCUTANEOUS at 11:31

## 2018-01-09 RX ADMIN — MULTIPLE VITAMINS W/ MINERALS TAB 1 TABLET: TAB at 11:32

## 2018-01-09 RX ADMIN — REGADENOSON 0.4 MG: 0.08 INJECTION, SOLUTION INTRAVENOUS at 08:32

## 2018-01-09 NOTE — PROCEDURES
regional wall motion abnormalities. Overall these results are most consistent with a high risk for significant  coronary artery disease. Additional testing including cardiac catheterization may be indicated. The results of this test were discussed with Dr. Naheed Urias on 01/09/2018 at  1:27 p.mJayde           [employer]    D: 01/09/2018 16:18:02       T: 01/09/2018 16:20:22     [email or web address]  Job#: [id number]    Doc#: Unknown    CC:  Rich Malin

## 2018-01-09 NOTE — PROGRESS NOTES
Hospitalist Progress Note  1/9/2018 6:44 AM  Subjective:   Admit Date: 1/7/2018  PCP: Bobby Pinzon MD    Interval History:   The patient feels much better, shortness of breath result, swelling in lower extremities also improving  He denies having headaches, chest pain, cough, nausea/vomiting      Diet: DIET CARDIAC; Low Sodium (2 GM)  Medications:   Scheduled Meds:   carvedilol  3.125 mg Oral BID WC    isosorbide mononitrate  30 mg Oral Daily    hydrALAZINE  12.5 mg Oral 3 times per day    atorvastatin  40 mg Oral Nightly    aspirin  81 mg Oral Daily    vitamin D  2,000 Units Oral Daily    therapeutic multivitamin-minerals  1 tablet Oral Daily    sodium chloride flush  10 mL Intravenous 2 times per day    enoxaparin  40 mg Subcutaneous Daily    doxycycline hyclate  100 mg Oral 2 times per day     Continuous Infusions:   PRN Medications: regadenoson, sodium chloride flush, sodium chloride flush, acetaminophen, magnesium hydroxide, ondansetron    Objective:   Vitals: /89   Pulse 89   Temp 97.5 °F (36.4 °C) (Oral)   Resp 20   Ht 6' 3\" (1.905 m)   Wt 214 lb (97.1 kg)   SpO2 94%   BMI 26.75 kg/m²   BMI: Body mass index is 26.75 kg/m².     CBC:   Recent Labs      01/07/18   0934  01/08/18   0545   WBC  9.3  8.4   HGB  16.5  16.6   PLT  155  163     BMP:    Recent Labs      01/07/18   0934  01/08/18   0545  01/09/18   0517   NA  144  145*  144   K  3.8  4.2  4.2   CL  105  103  105   CO2  26  28  29   BUN  26*  26*  30*   CREATININE  1.12  1.32*  1.37*   GLUCOSE  116*  111*  96     Hepatic:   Recent Labs      01/07/18   0934   AST  27   ALT  33   BILITOT  1.42*   ALKPHOS  56        General Appearance: alert and oriented to person, place and time, in no acute distress  Cardiovascular: normal rate, regular rhythm, normal S1 and S2, no murmur  Pulmonary/Chest: clear to auscultation bilaterally- no wheezes, rales or rhonchi, normal air movement, no respiratory distress  Abdomen: soft, non-tender,

## 2018-01-09 NOTE — PROGRESS NOTES
Pt has elevated BP. Dr. Monika Gimenez on floor at this time and notified of BP face to face. No new orders at this time, but does want pt to receive medications ordered at earlier time today.

## 2018-01-09 NOTE — PROGRESS NOTES
Cardiology    No further episodes of NSVT (13 run of NSVT before institution of b-blocker). He is mildly tachycardia in what appears to be a junctional rhythm. Will increase activity. Yecenia Alcazar pending. Watching for further arrythmias. Will increase Coreg to 6.25 mg bid. If he does well walking, and rhythm is stable would anticipate discharge later today, with follow up including cardiac cath as out patient. If any further arrhythmias, or if he does not do well with ambulation, would transfer for earlier cath.     Thanks, Jazmine Art MD

## 2018-01-09 NOTE — DISCHARGE SUMMARY
Hospitalist Discharge Summary    Patient:  Vin Stevens  YOB: 1950    MRN: [id number]   Acct: [de-identified]    Primary Care Physician: Rita Gilford, MD    Admit date:  1/7/2018    Discharge date:  1/9/2018       Discharge Diagnoses:   Acute systolic congestive heart failure ([address] EF 25-30% per 2Decho  Recent RLL pmeumonia  Severe CAD, s/p CABG 6/15  HTN  Hyperlipidemia  Noncompliance with medications    Discharge Medications:     Parvez Rodriguez   Home Medication Instructions SDT:[id number]    Printed on:01/09/18 4983   Medication Information                      Ascorbic Acid (VITAMIN C) 500 MG tablet  Take 500 mg by mouth daily             aspirin 81 MG tablet  Take 81 mg by mouth daily Holding for surgery             atorvastatin (LIPITOR) 40 MG tablet  Take 1 tablet by mouth nightly             BEE POLLEN PO  Take by mouth             carvedilol (COREG) 6.25 MG tablet  Take 1 tablet by mouth 2 times daily (with meals)             Cholecalciferol (VITAMIN D3) 2000 UNITS CAPS  Take 2,000 Units by mouth daily             doxycycline monohydrate (MONODOX) 100 MG capsule  Take 1 capsule by mouth 2 times daily             Fish Oil-Cholecalciferol (FISH OIL + D3 PO)  Take by mouth             Flaxseed, Linseed, (FLAX SEED OIL PO)  Take by mouth             furosemide (LASIX) 20 MG tablet  Take 1 tablet by mouth daily             hydrALAZINE (APRESOLINE) 25 MG tablet  Take 0.5 tablets by mouth every 8 hours             isosorbide mononitrate (IMDUR) 30 MG extended release tablet  Take 1 tablet by mouth daily             Multiple Vitamins-Minerals (THERAPEUTIC MULTIVITAMIN-MINERALS) tablet  Take 1 tablet by mouth daily             potassium chloride (KLOR-CON M) 10 MEQ extended release tablet  Take 1 tablet by mouth daily             Red Yeast Rice Extract (RED YEAST RICE PO)  Take by mouth daily Doesn't know dose             vitamin E 1000 UNITS capsule  Take 1,000 Units by mouth daily Diet:  DIET CARDIAC; Low Sodium (2 GM)    Activity:  As tolerates    Follow-up:  in 1 weeks with Candace Alexis MD, f/u with Dr. Myra Paz in one week    Consultants:  Dr. Myra Paz              Physical Exam:    Sabine Vargas for the past 24 hrs:   BP Temp Temp src Pulse Resp SpO2 Weight   01/09/18 1500 100/76 - - - - - -   01/09/18 1130 128/81 97.9 °F (36.6 °C) Oral 91 18 96 % -   01/09/18 1039 - - - - 18 93 % -   01/09/18 0841 (!) 131/103 - - 93 - - -   01/09/18 0840 (!) 132/102 - - 90 - - -   01/09/18 0839 (!) 138/101 - - 94 - - -   01/09/18 0838 (!) 137/102 - - 92 - - -   01/09/18 0837 (!) 140/106 - - 93 - - -   01/09/18 0836 (!) 132/109 - - 90 - - -   01/09/18 0835 (!) 129/108 - - 92 - - -   01/09/18 0830 (!) 130/110 - - 97 - - -   01/09/18 0815 110/87 - - 93 - - -   01/09/18 0739 103/64 - - 86 - - -   01/09/18 0736 82/62 97.6 °F (36.4 °C) Oral 96 20 97 % -   01/09/18 0630 - - - - - - 214 lb (97.1 kg)   01/09/18 0430 124/89 97.5 °F (36.4 °C) Oral 89 20 94 % -   01/09/18 0025 120/78 97.5 °F (36.4 °C) Oral 87 18 96 % -   01/08/18 2045 119/69 97.7 °F (36.5 °C) Oral 85 20 94 % -   01/08/18 1900 - - - - - 97 % -       General Appearance: alert and oriented to person, place and time, in no acute distress  Cardiovascular: normal rate, regular rhythm, normal S1 and S2, no murmur  Pulmonary/Chest: clear to auscultation bilaterally- no wheezes, rales or rhonchi, normal air movement, no respiratory distress  Abdomen: soft, non-tender, non-distended, normal bowel sounds, no masses   Extremities: no cyanosis, clubbing , trace  edema present in both lower extremities   Skin: warm and dry, no rash or erythema    Hospital Course: The patient is a 79 y.o. male presented to the emergency room complaining of worsening shortness of breath, increased swelling in both legs for the past 2 days.    He has a h/o severe CAD s/p CABG 6/15 and was not taking meds for CAD  In ER is vitals were stable, he was 95% on room

## 2018-01-09 NOTE — CONSULTS
test.    DISCUSSION:  The patient has developed shortness of breath, loss of energy  in the last week of December. He felt that it was a flu syndrome and it  was found that he had pneumonia when he went to the emergency room on  01/01/2018. His EKG, however, had changed significantly from 04/2017. Therefore, I questioned whether there was an element of ischemia that was  also causing the shortness of breath. He was discharged on 01/01 with doxycycline, but he did not improve. He  developed marked edema two days prior to admission with marked shortness of  breath with any activity, which again may be anginal equivalent. He has responded well to diuresis. He is better symptomatically. However,  again, I am concerned about occult ischemia as an etiology for his marked  shortness of breath rather than just pneumonia, but CTA does show a  small-to-moderate pleural effusion, but not enough that I would believe  would cause this much shortness of breath. We will do an echocardiogram to look at LV size and function, we will do a  Lexiscan Cardiolite stress test tomorrow. I suspect, however, that we will  be doing a cardiac catheterization at some point to define his anatomy  unless his stress test looks amazingly good. At this point, he does not appear to have any ongoing infection. His white  count is good and he is not febrile. It appears that this is more of a  cardiac etiology than an infectious etiology now. I am hoping that he will rethink his aversion to medications and consider  statins as well as beta blockers, etc.    Thank you very much for allowing me the privilege to see this patient. If  you have any questions on my thoughts, please do not hesitate to contact  me.         Julieta Abdalla    D: 01/08/2018 12:03:43       T: 01/08/2018 16:15:17     [email or web address]  Job#: [id number]     Doc#: [id number]    CC:  Luly Broussard

## 2018-01-11 ENCOUNTER — TELEPHONE (OUTPATIENT)
Dept: CARDIOLOGY CLINIC | Age: 68
End: 2018-01-11

## 2018-01-15 ENCOUNTER — HOSPITAL ENCOUNTER (OUTPATIENT)
Age: 68
Discharge: HOME | End: 2018-01-15
Payer: MEDICARE

## 2018-01-15 DIAGNOSIS — R00.0 TACHYCARDIA: ICD-10-CM

## 2018-01-15 DIAGNOSIS — I10 ESSENTIAL HYPERTENSION: ICD-10-CM

## 2018-01-15 DIAGNOSIS — I25.10 CORONARY ARTERY DISEASE DUE TO LIPID RICH PLAQUE: ICD-10-CM

## 2018-01-15 DIAGNOSIS — I25.83 CORONARY ARTERY DISEASE DUE TO LIPID RICH PLAQUE: ICD-10-CM

## 2018-01-15 LAB
ABSOLUTE EOS #: 0.2 K/UL (ref 0–0.4)
ABSOLUTE IMMATURE GRANULOCYTE: NORMAL K/UL (ref 0–0.3)
ABSOLUTE LYMPH #: 2.4 K/UL (ref 1–4.8)
ABSOLUTE MONO #: 0.7 K/UL (ref 0–1)
ALBUMIN SERPL-MCNC: 3.6 G/DL (ref 3.5–5.2)
ALBUMIN/GLOBULIN RATIO: ABNORMAL (ref 1–2.5)
ALP BLD-CCNC: 61 U/L (ref 40–129)
ALT SERPL-CCNC: 71 U/L (ref 5–41)
ANION GAP SERPL CALCULATED.3IONS-SCNC: 12 MMOL/L (ref 9–17)
AST SERPL-CCNC: 38 U/L
BASOPHILS # BLD: 0 % (ref 0–2)
BASOPHILS ABSOLUTE: 0 K/UL (ref 0–0.2)
BILIRUB SERPL-MCNC: 0.87 MG/DL (ref 0.3–1.2)
BUN BLDV-MCNC: 26 MG/DL (ref 8–23)
BUN/CREAT BLD: 20 (ref 9–20)
CALCIUM SERPL-MCNC: 9 MG/DL (ref 8.6–10.4)
CHLORIDE BLD-SCNC: 106 MMOL/L (ref 98–107)
CO2: 25 MMOL/L (ref 20–31)
CREAT SERPL-MCNC: 1.29 MG/DL (ref 0.7–1.2)
DIFFERENTIAL TYPE: YES
EOSINOPHILS RELATIVE PERCENT: 2 % (ref 0–5)
GFR AFRICAN AMERICAN: >60 ML/MIN
GFR NON-AFRICAN AMERICAN: 56 ML/MIN
GFR SERPL CREATININE-BSD FRML MDRD: ABNORMAL ML/MIN/{1.73_M2}
GFR SERPL CREATININE-BSD FRML MDRD: ABNORMAL ML/MIN/{1.73_M2}
GLUCOSE BLD-MCNC: 90 MG/DL (ref 70–99)
HCT VFR BLD CALC: 50.6 % (ref 41–53)
HEMOGLOBIN: 16.8 G/DL (ref 13.5–17.5)
IMMATURE GRANULOCYTES: NORMAL %
LYMPHOCYTES # BLD: 28 % (ref 13–44)
MCH RBC QN AUTO: 31.2 PG (ref 26–34)
MCHC RBC AUTO-ENTMCNC: 33.2 G/DL (ref 31–37)
MCV RBC AUTO: 94.1 FL (ref 80–100)
MONOCYTES # BLD: 9 % (ref 5–9)
NRBC AUTOMATED: NORMAL PER 100 WBC
PDW BLD-RTO: 13.6 % (ref 12.1–15.2)
PLATELET # BLD: 177 K/UL (ref 140–450)
PLATELET ESTIMATE: NORMAL
PMV BLD AUTO: NORMAL FL (ref 6–12)
POTASSIUM SERPL-SCNC: 4.6 MMOL/L (ref 3.7–5.3)
RBC # BLD: 5.37 M/UL (ref 4.5–5.9)
RBC # BLD: NORMAL 10*6/UL
SEG NEUTROPHILS: 61 % (ref 39–75)
SEGMENTED NEUTROPHILS ABSOLUTE COUNT: 5.2 K/UL (ref 2.1–6.5)
SODIUM BLD-SCNC: 143 MMOL/L (ref 135–144)
TOTAL PROTEIN: 5.9 G/DL (ref 6.4–8.3)
WBC # BLD: 8.5 K/UL (ref 3.5–11)
WBC # BLD: NORMAL 10*3/UL

## 2018-01-15 PROCEDURE — 80053 COMPREHEN METABOLIC PANEL: CPT

## 2018-01-15 PROCEDURE — 36415 COLL VENOUS BLD VENIPUNCTURE: CPT

## 2018-01-15 PROCEDURE — 85025 COMPLETE CBC W/AUTO DIFF WBC: CPT

## 2018-01-16 ENCOUNTER — OFFICE VISIT (OUTPATIENT)
Dept: CARDIOLOGY CLINIC | Age: 68
End: 2018-01-16
Payer: MEDICARE

## 2018-01-16 VITALS
SYSTOLIC BLOOD PRESSURE: 110 MMHG | HEART RATE: 76 BPM | WEIGHT: 221 LBS | DIASTOLIC BLOOD PRESSURE: 80 MMHG | OXYGEN SATURATION: 94 % | BODY MASS INDEX: 27.62 KG/M2

## 2018-01-16 DIAGNOSIS — E78.00 PURE HYPERCHOLESTEROLEMIA: Primary | ICD-10-CM

## 2018-01-16 PROCEDURE — 1111F DSCHRG MED/CURRENT MED MERGE: CPT | Performed by: INTERNAL MEDICINE

## 2018-01-16 PROCEDURE — 99213 OFFICE O/P EST LOW 20 MIN: CPT | Performed by: INTERNAL MEDICINE

## 2018-01-16 PROCEDURE — 3017F COLORECTAL CA SCREEN DOC REV: CPT | Performed by: INTERNAL MEDICINE

## 2018-01-16 PROCEDURE — 1123F ACP DISCUSS/DSCN MKR DOCD: CPT | Performed by: INTERNAL MEDICINE

## 2018-01-16 PROCEDURE — G8598 ASA/ANTIPLAT THER USED: HCPCS | Performed by: INTERNAL MEDICINE

## 2018-01-16 PROCEDURE — G8427 DOCREV CUR MEDS BY ELIG CLIN: HCPCS | Performed by: INTERNAL MEDICINE

## 2018-01-16 PROCEDURE — G8484 FLU IMMUNIZE NO ADMIN: HCPCS | Performed by: INTERNAL MEDICINE

## 2018-01-16 PROCEDURE — G8417 CALC BMI ABV UP PARAM F/U: HCPCS | Performed by: INTERNAL MEDICINE

## 2018-01-16 PROCEDURE — 1036F TOBACCO NON-USER: CPT | Performed by: INTERNAL MEDICINE

## 2018-01-16 PROCEDURE — 4040F PNEUMOC VAC/ADMIN/RCVD: CPT | Performed by: INTERNAL MEDICINE

## 2018-01-16 NOTE — PROGRESS NOTES
cardiomyopathy or secondary to graft failure. With his abnormal stress test with a high risk for coronary artery disease and his symptoms consistent with class III angina with marked shortness of breath with any activity, I think it is reasonable to proceed with a cardiac catheterization to define his anatomy. We will do so this coming week. He understands the risks and benefits. Thank you very much for allowing me the privilege of seeing Mr. Ventura. If you have any questions on my thoughts, please do not hesitate to contact me.     Sincerely,          Mila Leahy    D: 01/16/2018 8:35:16       T: 01/16/2018 9:41:34     [email or web address]  Job#: [id number]     Doc#: [id number]

## 2018-01-16 NOTE — LETTER
NECK:  No JVD. Good carotid pulses. There were bilateral carotid bruits. No lymphadenopathy or thyromegaly. CARDIOVASCULAR EXAM:  S1 and S2 were normal.  No S3 or S4. Soft systolic blowing type murmur. No diastolic murmur. PMI was normal.  No lifts, thrusts, or pericardial friction rub. LUNGS:  Quite clear to auscultation and percussion. ABDOMEN:  Soft and nontender. Good bowel sounds. The aorta was not enlarged. No hepatomegaly, splenomegaly. EXTREMITIES:  Good femoral pulses. Good pedal pulses. Trace pedal edema. Skin was warm and dry. No calf tenderness. Nail beds pink. Good cap refill. PULSES:  Bilateral symmetrical radial, brachial and carotid pulses. There were bilateral carotid bruits. Good femoral and pedal pulses. NEUROLOGIC EXAM:  Within normal limits. PSYCHIATRIC EXAM:  Within normal limits. LABORATORY DATA:  From today, sodium 143, potassium 4.6, BUN 26, creatinine 1.29, GFR is 56. ALT was 71, AST was 38. White count was 8.5, hemoglobin 16.8 with a platelet count of 893,499. EKG showed sinus rhythm with marked T-wave inversion inferior anterolaterally. Echocardiogram showed an EF of 25% to 30% with moderate to severe mitral regurgitation. IMPRESSION:  1. Severe cardiomyopathy, EF of 25% to 30%, which is a marked change from previous ejection fraction, which was 45% to 50%. 2.  Moderate to severe mitral regurgitation. 3.  Marked shortness of breath with any exertion consistent with class III angina. PLAN:  1. Decrease his hydralazine to 25 mg half a tablet b.i.d. from t.i.d. because of his blood pressure being 105 to 110 mmHg. 2.  Left heart catheterization this coming week to define his anatomy in view of his severe shortness of breath and severe cardiomyopathy and abnormal Cardiolite stress test with a high risk for CAD. DISCUSSION:  Mr. Daniel Gregory remains weak and short of breath with any activity.

## 2018-01-17 ENCOUNTER — HOSPITAL ENCOUNTER (OUTPATIENT)
Age: 68
Discharge: HOME | End: 2018-01-17
Payer: MEDICARE

## 2018-01-17 ENCOUNTER — OFFICE VISIT (OUTPATIENT)
Dept: FAMILY MEDICINE CLINIC | Age: 68
End: 2018-01-17
Payer: MEDICARE

## 2018-01-17 VITALS
DIASTOLIC BLOOD PRESSURE: 82 MMHG | HEART RATE: 88 BPM | OXYGEN SATURATION: 94 % | WEIGHT: 217 LBS | BODY MASS INDEX: 27.12 KG/M2 | SYSTOLIC BLOOD PRESSURE: 116 MMHG

## 2018-01-17 DIAGNOSIS — G47.09 OTHER INSOMNIA: ICD-10-CM

## 2018-01-17 DIAGNOSIS — I50.21 ACUTE SYSTOLIC CONGESTIVE HEART FAILURE (HCC): Primary | ICD-10-CM

## 2018-01-17 DIAGNOSIS — E78.00 PURE HYPERCHOLESTEROLEMIA: ICD-10-CM

## 2018-01-17 DIAGNOSIS — I50.22 HEART FAILURE, SYSTOLIC, CHRONIC (HCC): ICD-10-CM

## 2018-01-17 LAB
CHOLESTEROL/HDL RATIO: 3.1
CHOLESTEROL: 128 MG/DL
HDLC SERPL-MCNC: 41 MG/DL
LDL CHOLESTEROL: 60 MG/DL (ref 0–130)
PATIENT FASTING?: YES
TRIGL SERPL-MCNC: 136 MG/DL
VLDLC SERPL CALC-MCNC: NORMAL MG/DL (ref 1–30)

## 2018-01-17 PROCEDURE — G8598 ASA/ANTIPLAT THER USED: HCPCS | Performed by: FAMILY MEDICINE

## 2018-01-17 PROCEDURE — 80061 LIPID PANEL: CPT

## 2018-01-17 PROCEDURE — G8417 CALC BMI ABV UP PARAM F/U: HCPCS | Performed by: FAMILY MEDICINE

## 2018-01-17 PROCEDURE — G8484 FLU IMMUNIZE NO ADMIN: HCPCS | Performed by: FAMILY MEDICINE

## 2018-01-17 PROCEDURE — 1111F DSCHRG MED/CURRENT MED MERGE: CPT | Performed by: FAMILY MEDICINE

## 2018-01-17 PROCEDURE — 3017F COLORECTAL CA SCREEN DOC REV: CPT | Performed by: FAMILY MEDICINE

## 2018-01-17 PROCEDURE — G8427 DOCREV CUR MEDS BY ELIG CLIN: HCPCS | Performed by: FAMILY MEDICINE

## 2018-01-17 PROCEDURE — 99213 OFFICE O/P EST LOW 20 MIN: CPT | Performed by: FAMILY MEDICINE

## 2018-01-17 PROCEDURE — 4040F PNEUMOC VAC/ADMIN/RCVD: CPT | Performed by: FAMILY MEDICINE

## 2018-01-17 PROCEDURE — 1123F ACP DISCUSS/DSCN MKR DOCD: CPT | Performed by: FAMILY MEDICINE

## 2018-01-17 PROCEDURE — 1036F TOBACCO NON-USER: CPT | Performed by: FAMILY MEDICINE

## 2018-01-17 PROCEDURE — 36415 COLL VENOUS BLD VENIPUNCTURE: CPT

## 2018-01-17 ASSESSMENT — ENCOUNTER SYMPTOMS
BLOOD IN STOOL: 0
ABDOMINAL PAIN: 0
SHORTNESS OF BREATH: 1
COUGH: 0
VOMITING: 0
TROUBLE SWALLOWING: 0
EYE DISCHARGE: 0
CONSTIPATION: 0
DIARRHEA: 0
NAUSEA: 0
EYE REDNESS: 0
FACIAL SWELLING: 0

## 2018-01-25 ENCOUNTER — TELEPHONE (OUTPATIENT)
Dept: CARDIOLOGY CLINIC | Age: 68
End: 2018-01-25

## 2018-01-25 DIAGNOSIS — I20.9 ANGINA, CLASS III (HCC): Primary | ICD-10-CM

## 2018-01-25 DIAGNOSIS — I42.9 CARDIOMYOPATHY, UNSPECIFIED TYPE (HCC): ICD-10-CM

## 2018-02-01 ENCOUNTER — HOSPITAL ENCOUNTER (OUTPATIENT)
Dept: CARDIAC REHAB | Age: 68
Setting detail: THERAPIES SERIES
Discharge: HOME | End: 2018-02-01
Payer: MEDICARE

## 2018-02-06 ENCOUNTER — HOSPITAL ENCOUNTER (OUTPATIENT)
Dept: CARDIAC REHAB | Age: 68
Setting detail: THERAPIES SERIES
Discharge: HOME | End: 2018-02-06
Payer: MEDICARE

## 2018-02-06 VITALS
DIASTOLIC BLOOD PRESSURE: 62 MMHG | SYSTOLIC BLOOD PRESSURE: 92 MMHG | BODY MASS INDEX: 26.91 KG/M2 | HEIGHT: 75 IN | WEIGHT: 216.4 LBS

## 2018-02-06 LAB
EKG ATRIAL RATE: 88 BPM
EKG P-R INTERVAL: 112 MS
EKG Q-T INTERVAL: 478 MS
EKG QRS DURATION: 156 MS
EKG QTC CALCULATION (BAZETT): 578 MS
EKG R AXIS: -60 DEGREES
EKG T AXIS: 128 DEGREES
EKG VENTRICULAR RATE: 88 BPM

## 2018-02-06 PROCEDURE — 93005 ELECTROCARDIOGRAM TRACING: CPT

## 2018-02-06 NOTE — PROGRESS NOTES
isn't worth living?     1 OF 9 SCORE    *If greater than 5 symptoms listed,  notified. Cardiac Rehab Pre - Test  1. The heart is a muscle that acts like a pump to deliver oxygen and blood to the rest of the body. [x] True   [] False  2. Healing from the damage of a heart attach is complete in two weeks. [] True   [x] False  3. Smoking has no direct effect on the heart - it only effects your lungs. [] True   [x] False  4. Using all the salt you want is acceptable for all heart patients. [] True   [x] False  5. Chest pain that is relieved by rest or nitroglycerine is called Angina. [] True   [x] False  6. Shortness of breath, indigestion, sweating, tightness or pain in your chest are symptoms of a heart attack. [x] True   [] False  7. Swelling of the feet and ankles only means you've been on your feet too much. [] True   [x] False  8. Saturated fats raised your blood cholesterol level more than anything else in your diet. [x] True   [] False  9. High Blood pressure can take care of itself by rest alone. [] True   [x] False  10. Walking is one of the best exercises for heart attack patients. [x] True   [] False    SCORE 90%    Physical Findings   Cardiovascular- No edema, S1-S2 and apically regular to auscultation, strong bilateral upper and lower extremity pulses at +2, THERE ARE BILATERAL CAROTID BRUITS. Monitor rhythm-JUNCTIONAL RHYTHM W/ OCCASIONAL MULTIFOCAL PVC'S AND INTRAVENTRICULAR CONDUCTION DISTURBANCE  VR- 86  ND- INDETERMINATE   QRS-0.16   QT- 0.48        Pulmonary- Clear to auscultation bilaterally through out. Neurological-OCCASIONAL BILATERAL HAND NUMBNESS, RESOLVING W/ CHANGE IN ARM POSITION. Peripheral Vascular- No deficit noted or reported.   Muscular Skeletal- CHRONIC-INTERMITTENT LEFT KNEE AND LEFT ANKLE PAIN R/T REMOTE INJURY AND REPAIR  Pain Assessment- DENIES PAIN AT PRESENT  Pain Level Tolerable <4+/10 on 10 point Likert scale  Location/ radiation/ Frequency/ Duration- N/A  Endocrine- No deficit noted or reported. Gastrointestinal- No deficit noted or reported. Genitourinary- No deficit noted or reported. Physical limitations- only as per above    Goals:   -increased stamina/strength to 30-50 total exercise by increasing 1-2 level/wk and 1-2   min/wk  to achieve -120 and RPE 12-16 / INCREASE AVG CARDIO FC BY AT LEAST 0.5-1.0 MET IN THE NEXT 30 DAYS AND TOLERATE >31-40 MIN W/IN EX RX TARGETS    MAINTAIN BODY WT BUT INCREASE LEAN BODY MASS IN NEXT 30 DAYS     -introduce weights/ BLUE therabands AND WTS TO 8-24 # for 10-15 reps     -MAINTAIN OPTIMAL.  BP / AVOID HYPOTENSION OR ORTHOSTATIC HYPOTENSIVE EPISODES     -improved cholesterol and  Triglycerides      -develop regular exercise 30 min daily, AT LEAST 3-5 DAYS PER WEEK CONSISTENTLY W/IN THE NEXT 30 DAYS    ALEX CHO RN, CEP

## 2018-02-07 ENCOUNTER — HOSPITAL ENCOUNTER (OUTPATIENT)
Dept: CARDIAC REHAB | Age: 68
Setting detail: THERAPIES SERIES
Discharge: HOME | End: 2018-02-07
Payer: MEDICARE

## 2018-02-07 PROCEDURE — 93798 PHYS/QHP OP CAR RHAB W/ECG: CPT

## 2018-02-09 ENCOUNTER — HOSPITAL ENCOUNTER (OUTPATIENT)
Dept: CARDIAC REHAB | Age: 68
Setting detail: THERAPIES SERIES
Discharge: HOME | End: 2018-02-09
Payer: MEDICARE

## 2018-02-09 PROCEDURE — 93798 PHYS/QHP OP CAR RHAB W/ECG: CPT

## 2018-02-09 RX ORDER — FUROSEMIDE 20 MG/1
20 TABLET ORAL 2 TIMES DAILY
Qty: 180 TABLET | Refills: 3 | Status: SHIPPED | OUTPATIENT
Start: 2018-02-09 | End: 2018-03-05 | Stop reason: CLARIF

## 2018-02-12 ENCOUNTER — HOSPITAL ENCOUNTER (OUTPATIENT)
Dept: CARDIAC REHAB | Age: 68
Setting detail: THERAPIES SERIES
Discharge: HOME | End: 2018-02-12
Payer: MEDICARE

## 2018-02-12 PROCEDURE — 93798 PHYS/QHP OP CAR RHAB W/ECG: CPT

## 2018-02-12 RX ORDER — POTASSIUM CHLORIDE 750 MG/1
10 TABLET, EXTENDED RELEASE ORAL 2 TIMES DAILY
Qty: 180 TABLET | Refills: 3 | Status: SHIPPED | OUTPATIENT
Start: 2018-02-12 | End: 2018-11-05 | Stop reason: SDUPTHER

## 2018-02-12 RX ORDER — ATORVASTATIN CALCIUM 40 MG/1
40 TABLET, FILM COATED ORAL NIGHTLY
Qty: 90 TABLET | Refills: 3 | Status: SHIPPED | OUTPATIENT
Start: 2018-02-12 | End: 2018-03-21 | Stop reason: SDUPTHER

## 2018-02-12 RX ORDER — ISOSORBIDE MONONITRATE 30 MG/1
30 TABLET, EXTENDED RELEASE ORAL DAILY
Qty: 90 TABLET | Refills: 3 | Status: SHIPPED | OUTPATIENT
Start: 2018-02-12 | End: 2018-03-21 | Stop reason: SDUPTHER

## 2018-02-12 RX ORDER — CARVEDILOL 6.25 MG/1
6.25 TABLET ORAL 2 TIMES DAILY WITH MEALS
Qty: 180 TABLET | Refills: 3 | Status: SHIPPED | OUTPATIENT
Start: 2018-02-12 | End: 2018-03-21 | Stop reason: ALTCHOICE

## 2018-02-12 RX ORDER — FUROSEMIDE 20 MG/1
20 TABLET ORAL 2 TIMES DAILY
Qty: 180 TABLET | Refills: 3 | Status: SHIPPED | OUTPATIENT
Start: 2018-02-12 | End: 2018-02-21 | Stop reason: SDUPTHER

## 2018-02-14 ENCOUNTER — APPOINTMENT (OUTPATIENT)
Dept: CARDIAC REHAB | Age: 68
End: 2018-02-14
Payer: MEDICARE

## 2018-02-16 ENCOUNTER — HOSPITAL ENCOUNTER (OUTPATIENT)
Dept: CARDIAC REHAB | Age: 68
Setting detail: THERAPIES SERIES
Discharge: HOME | End: 2018-02-16
Payer: MEDICARE

## 2018-02-16 PROCEDURE — 93798 PHYS/QHP OP CAR RHAB W/ECG: CPT

## 2018-02-19 ENCOUNTER — HOSPITAL ENCOUNTER (OUTPATIENT)
Dept: CARDIAC REHAB | Age: 68
Setting detail: THERAPIES SERIES
Discharge: HOME | End: 2018-02-19
Payer: MEDICARE

## 2018-02-19 PROCEDURE — 93798 PHYS/QHP OP CAR RHAB W/ECG: CPT

## 2018-02-21 ENCOUNTER — TELEPHONE (OUTPATIENT)
Dept: CARDIOLOGY CLINIC | Age: 68
End: 2018-02-21

## 2018-02-21 ENCOUNTER — HOSPITAL ENCOUNTER (OUTPATIENT)
Dept: CARDIAC REHAB | Age: 68
Setting detail: THERAPIES SERIES
Discharge: HOME | End: 2018-02-21
Payer: MEDICARE

## 2018-02-21 PROCEDURE — 93798 PHYS/QHP OP CAR RHAB W/ECG: CPT

## 2018-02-21 RX ORDER — FUROSEMIDE 20 MG/1
20 TABLET ORAL 2 TIMES DAILY
Qty: 60 TABLET | Refills: 0 | Status: SHIPPED | OUTPATIENT
Start: 2018-02-21 | End: 2018-03-21 | Stop reason: SDUPTHER

## 2018-02-23 ENCOUNTER — HOSPITAL ENCOUNTER (OUTPATIENT)
Dept: CARDIAC REHAB | Age: 68
Setting detail: THERAPIES SERIES
Discharge: HOME | End: 2018-02-23
Payer: MEDICARE

## 2018-02-26 ENCOUNTER — HOSPITAL ENCOUNTER (OUTPATIENT)
Dept: CARDIAC REHAB | Age: 68
Setting detail: THERAPIES SERIES
Discharge: HOME | End: 2018-02-26
Payer: MEDICARE

## 2018-02-26 PROCEDURE — 93798 PHYS/QHP OP CAR RHAB W/ECG: CPT

## 2018-02-28 ENCOUNTER — APPOINTMENT (OUTPATIENT)
Dept: CARDIAC REHAB | Age: 68
End: 2018-02-28
Payer: MEDICARE

## 2018-03-02 ENCOUNTER — HOSPITAL ENCOUNTER (OUTPATIENT)
Dept: CARDIAC REHAB | Age: 68
Setting detail: THERAPIES SERIES
Discharge: HOME | End: 2018-03-02
Payer: MEDICARE

## 2018-03-05 ENCOUNTER — HOSPITAL ENCOUNTER (OUTPATIENT)
Age: 68
Discharge: HOME | End: 2018-03-05
Payer: MEDICARE

## 2018-03-05 ENCOUNTER — OFFICE VISIT (OUTPATIENT)
Dept: CARDIOLOGY CLINIC | Age: 68
End: 2018-03-05
Payer: MEDICARE

## 2018-03-05 ENCOUNTER — HOSPITAL ENCOUNTER (OUTPATIENT)
Dept: CARDIAC REHAB | Age: 68
Setting detail: THERAPIES SERIES
Discharge: HOME | End: 2018-03-05
Payer: MEDICARE

## 2018-03-05 VITALS
DIASTOLIC BLOOD PRESSURE: 84 MMHG | OXYGEN SATURATION: 96 % | WEIGHT: 214 LBS | SYSTOLIC BLOOD PRESSURE: 120 MMHG | BODY MASS INDEX: 26.75 KG/M2 | HEART RATE: 120 BPM

## 2018-03-05 DIAGNOSIS — I25.83 CORONARY ARTERY DISEASE DUE TO LIPID RICH PLAQUE: ICD-10-CM

## 2018-03-05 DIAGNOSIS — I48.92 ATRIAL FLUTTER, UNSPECIFIED TYPE (HCC): Primary | ICD-10-CM

## 2018-03-05 DIAGNOSIS — R53.83 FATIGUE, UNSPECIFIED TYPE: ICD-10-CM

## 2018-03-05 DIAGNOSIS — I25.10 CORONARY ARTERY DISEASE DUE TO LIPID RICH PLAQUE: ICD-10-CM

## 2018-03-05 DIAGNOSIS — I48.92 ATRIAL FLUTTER, UNSPECIFIED TYPE (HCC): ICD-10-CM

## 2018-03-05 LAB
ABSOLUTE EOS #: 0.2 K/UL (ref 0–0.4)
ABSOLUTE IMMATURE GRANULOCYTE: ABNORMAL K/UL (ref 0–0.3)
ABSOLUTE LYMPH #: 2.1 K/UL (ref 1–4.8)
ABSOLUTE MONO #: 0.6 K/UL (ref 0–1)
ALBUMIN SERPL-MCNC: 4 G/DL (ref 3.5–5.2)
ALBUMIN/GLOBULIN RATIO: ABNORMAL (ref 1–2.5)
ALP BLD-CCNC: 65 U/L (ref 40–129)
ALT SERPL-CCNC: 42 U/L (ref 5–41)
ANION GAP SERPL CALCULATED.3IONS-SCNC: 11 MMOL/L (ref 9–17)
AST SERPL-CCNC: 27 U/L
BASOPHILS # BLD: 1 % (ref 0–2)
BASOPHILS ABSOLUTE: 0.1 K/UL (ref 0–0.2)
BILIRUB SERPL-MCNC: 1.13 MG/DL (ref 0.3–1.2)
BUN BLDV-MCNC: 21 MG/DL (ref 8–23)
BUN/CREAT BLD: 21 (ref 9–20)
CALCIUM SERPL-MCNC: 9.3 MG/DL (ref 8.6–10.4)
CHLORIDE BLD-SCNC: 102 MMOL/L (ref 98–107)
CO2: 27 MMOL/L (ref 20–31)
CREAT SERPL-MCNC: 0.99 MG/DL (ref 0.7–1.2)
DIFFERENTIAL TYPE: YES
EKG ATRIAL RATE: 256 BPM
EKG P AXIS: -124 DEGREES
EKG Q-T INTERVAL: 178 MS
EKG QRS DURATION: 110 MS
EKG QTC CALCULATION (BAZETT): 242 MS
EKG R AXIS: 13 DEGREES
EKG T AXIS: 153 DEGREES
EKG VENTRICULAR RATE: 112 BPM
EOSINOPHILS RELATIVE PERCENT: 3 % (ref 0–5)
GFR AFRICAN AMERICAN: >60 ML/MIN
GFR NON-AFRICAN AMERICAN: >60 ML/MIN
GFR SERPL CREATININE-BSD FRML MDRD: ABNORMAL ML/MIN/{1.73_M2}
GFR SERPL CREATININE-BSD FRML MDRD: ABNORMAL ML/MIN/{1.73_M2}
GLUCOSE BLD-MCNC: 103 MG/DL (ref 70–99)
HCT VFR BLD CALC: 44 % (ref 41–53)
HEMOGLOBIN: 14.8 G/DL (ref 13.5–17.5)
IMMATURE GRANULOCYTES: ABNORMAL %
LYMPHOCYTES # BLD: 25 % (ref 13–44)
MCH RBC QN AUTO: 31.5 PG (ref 26–34)
MCHC RBC AUTO-ENTMCNC: 33.7 G/DL (ref 31–37)
MCV RBC AUTO: 93.4 FL (ref 80–100)
MONOCYTES # BLD: 8 % (ref 5–9)
NRBC AUTOMATED: ABNORMAL PER 100 WBC
PDW BLD-RTO: 13.8 % (ref 12.1–15.2)
PLATELET # BLD: 136 K/UL (ref 140–450)
PLATELET ESTIMATE: ABNORMAL
PMV BLD AUTO: ABNORMAL FL (ref 6–12)
POTASSIUM SERPL-SCNC: 4.6 MMOL/L (ref 3.7–5.3)
RBC # BLD: 4.71 M/UL (ref 4.5–5.9)
RBC # BLD: ABNORMAL 10*6/UL
SEG NEUTROPHILS: 63 % (ref 39–75)
SEGMENTED NEUTROPHILS ABSOLUTE COUNT: 5.3 K/UL (ref 2.1–6.5)
SODIUM BLD-SCNC: 140 MMOL/L (ref 135–144)
TOTAL PROTEIN: 6.2 G/DL (ref 6.4–8.3)
TSH SERPL DL<=0.05 MIU/L-ACNC: 0.56 MIU/L (ref 0.3–5)
WBC # BLD: 8.4 K/UL (ref 3.5–11)
WBC # BLD: ABNORMAL 10*3/UL

## 2018-03-05 PROCEDURE — 4040F PNEUMOC VAC/ADMIN/RCVD: CPT | Performed by: INTERNAL MEDICINE

## 2018-03-05 PROCEDURE — 3017F COLORECTAL CA SCREEN DOC REV: CPT | Performed by: INTERNAL MEDICINE

## 2018-03-05 PROCEDURE — 1123F ACP DISCUSS/DSCN MKR DOCD: CPT | Performed by: INTERNAL MEDICINE

## 2018-03-05 PROCEDURE — G8484 FLU IMMUNIZE NO ADMIN: HCPCS | Performed by: INTERNAL MEDICINE

## 2018-03-05 PROCEDURE — 93005 ELECTROCARDIOGRAM TRACING: CPT

## 2018-03-05 PROCEDURE — 99213 OFFICE O/P EST LOW 20 MIN: CPT | Performed by: INTERNAL MEDICINE

## 2018-03-05 PROCEDURE — 84443 ASSAY THYROID STIM HORMONE: CPT

## 2018-03-05 PROCEDURE — G8417 CALC BMI ABV UP PARAM F/U: HCPCS | Performed by: INTERNAL MEDICINE

## 2018-03-05 PROCEDURE — 36415 COLL VENOUS BLD VENIPUNCTURE: CPT

## 2018-03-05 PROCEDURE — 1036F TOBACCO NON-USER: CPT | Performed by: INTERNAL MEDICINE

## 2018-03-05 PROCEDURE — 85025 COMPLETE CBC W/AUTO DIFF WBC: CPT

## 2018-03-05 PROCEDURE — G8427 DOCREV CUR MEDS BY ELIG CLIN: HCPCS | Performed by: INTERNAL MEDICINE

## 2018-03-05 PROCEDURE — 93798 PHYS/QHP OP CAR RHAB W/ECG: CPT

## 2018-03-05 PROCEDURE — 80053 COMPREHEN METABOLIC PANEL: CPT

## 2018-03-05 PROCEDURE — G8598 ASA/ANTIPLAT THER USED: HCPCS | Performed by: INTERNAL MEDICINE

## 2018-03-05 NOTE — PATIENT INSTRUCTIONS
Will order cbc cmp tsh -Today   Will give samples of Xarelto 20mg one tablet daily   Will INCREASE COREG 6.25mg to 2 tablets twice a day   Will follow up week of March 19 with ekg prior

## 2018-03-07 ENCOUNTER — HOSPITAL ENCOUNTER (OUTPATIENT)
Dept: CARDIAC REHAB | Age: 68
Setting detail: THERAPIES SERIES
Discharge: HOME | End: 2018-03-07
Payer: MEDICARE

## 2018-03-07 PROCEDURE — 93798 PHYS/QHP OP CAR RHAB W/ECG: CPT

## 2018-03-08 NOTE — PROGRESS NOTES
Astrid Stevens M.D. [address] [place] [address]  [phone]        2018        Kath Oneil MD  [address] [address]    RE:   Cecilio Brady  :  1950    Dear Dr. Karime Lopes:    CHIEF COMPLAINT:  Atrial flutter. HISTORY OF PRESENT ILLNESS:  I had the pleasure of seeing Mr. Ventura on 2018. I asked him to come down after he was in cardiac rehab. When he was in cardiac rehab, he was noted to be tachycardic at 120 to 130 beats per minute on arrival.  He remained about the same through his cardiac rehab. We had him do a full 12-lead EKG, which showed that he was actually in atrial flutter with a varied ventricular response. I, therefore, asked him to stop in my office for further evaluation. He has been under much stress over the past week with family issues (sister). He has been in cardiac rehab; however, last Wednesday and Friday, he missed because he was working in [place]. He came back to cardiac rehab today. His rhythm has been sinus or junctional tachycardia in rehab and he has been doing well. His energy level is better although not back to baseline. He denies any chest pain or chest discomfort. He did have an episode where he developed shortness of breath and had increasing edema and we increased his Lasix and his edema resolved and his shortness of breath improved. Again, his energy level is not back at its baseline, but it is improving in cardiac rehab. As you know, he was found to have pneumonia and an echocardiogram showed severe LV dysfunction. Therefore, I did a cardiac catheterization on 2018. This showed a patent LIMA to the LAD. His circumflex had a 50% lesion in the ostium. The PDA was occluded. His right coronary artery was small and nondominant, it was 90%. His vein graft to the PDA had occluded. His EF was 35% at that time.     He denies any

## 2018-03-09 ENCOUNTER — HOSPITAL ENCOUNTER (OUTPATIENT)
Dept: CARDIAC REHAB | Age: 68
Setting detail: THERAPIES SERIES
Discharge: HOME | End: 2018-03-09
Payer: MEDICARE

## 2018-03-12 ENCOUNTER — APPOINTMENT (OUTPATIENT)
Dept: CARDIAC REHAB | Age: 68
End: 2018-03-12
Payer: MEDICARE

## 2018-03-14 ENCOUNTER — HOSPITAL ENCOUNTER (OUTPATIENT)
Dept: CARDIAC REHAB | Age: 68
Setting detail: THERAPIES SERIES
Discharge: HOME | End: 2018-03-14
Payer: MEDICARE

## 2018-03-14 PROCEDURE — 93798 PHYS/QHP OP CAR RHAB W/ECG: CPT

## 2018-03-15 NOTE — PROGRESS NOTES
Phase II Cardiac Rehab Individualized Treatment Plan-30 Day     Patient Name: Vin Stevens  Date of Initial Assessment: 2/6/18  ACCOUNT #  [de-identified]  Diagnosis: Stable angina pectoris 1/24/18 & Heart failure 1/8/18  Onset Date:Stable angina pectoris 1/24/18 & Heart failure 1/8/18   Referring Physician: Guera Dumont MD  Risk Stratification: High  Session Number:  10  EXERCISE    Stages of Change:   [] pre-contemplation   [x] Action   [] Contemplate   [] Maintainence   [] Prep   [] Relapse          Exercise Prescription:  Mode: [x] TM [x] UBE [x] STP [x] EL [x] R  Frequency: 3 DAYS PER WEEK  Duration: 31-60 MIN  Intensity: RPE 12-16, THRR ,   Progression: Increase 1-2 levels/week or 1-2 min/week to achieve target HR and RPE 11-13.      [] Angina with Exertion THR:    [x] Resistance Training  Weight (% OF 1RM):  50        Reps: 10-15    Hypertension:  [x] Yes  [] No  Resting BP: 118/80  Peak Exercise BP: 138/68  [] Med change? NO    Intervention:  Home Exercise:  Type: AEROBICS CLASS / WALKING  Duration: 20-60  Frequency: 1-3   [x] Resistance Training BLUE THERABANDS / FREE WTS  Weight (% OF 1RM):   50        Reps: 10-15    Education:   [x] Equipment [place]  [x] Self pulse   [x] Proper use weights/therabands   [x] S/S to report  [x] Low Na Diet    [x] Warm up/ Cool down  [x] BP Medication    [x] RPE Scale   [x] Understand BP   [x] Ex Safety   [x] Exercise specialist class-Home Exercise       Target Goal:   -Individual Exercise Plan  -BP<140/90 or <130/80 if DM   -Aerobic active 30 + minutes 5-7 days per week    Nutrition    Stages of Change:   [] pre-contemplation   [x] Action   [] Contemplate   [] Maintainence   [] Prep   [] Relapse    Lipids:   NO NEW VALUES  [] Med Change? NO    Diabetes:  [] Yes  [x] No  Random BS:  HbA1c:  [] Med Change?     Weight Management:  Wt Goal: KEEP WT W/IN 1-2 LB DAILY ON DAILY WTS FOR HF MONITORING    Intervention:   [] Dietitian Consult       [x] Nurse/Patient Discussion

## 2018-03-16 ENCOUNTER — HOSPITAL ENCOUNTER (OUTPATIENT)
Dept: CARDIAC REHAB | Age: 68
Setting detail: THERAPIES SERIES
Discharge: HOME | End: 2018-03-16
Payer: MEDICARE

## 2018-03-19 ENCOUNTER — HOSPITAL ENCOUNTER (OUTPATIENT)
Dept: CARDIAC REHAB | Age: 68
Setting detail: THERAPIES SERIES
Discharge: HOME | End: 2018-03-19
Payer: MEDICARE

## 2018-03-19 PROCEDURE — 93798 PHYS/QHP OP CAR RHAB W/ECG: CPT

## 2018-03-21 ENCOUNTER — OFFICE VISIT (OUTPATIENT)
Dept: FAMILY MEDICINE CLINIC | Age: 68
End: 2018-03-21
Payer: MEDICARE

## 2018-03-21 ENCOUNTER — HOSPITAL ENCOUNTER (OUTPATIENT)
Dept: CARDIAC REHAB | Age: 68
Setting detail: THERAPIES SERIES
Discharge: HOME | End: 2018-03-21
Payer: MEDICARE

## 2018-03-21 VITALS
BODY MASS INDEX: 27 KG/M2 | DIASTOLIC BLOOD PRESSURE: 80 MMHG | OXYGEN SATURATION: 96 % | WEIGHT: 216 LBS | SYSTOLIC BLOOD PRESSURE: 120 MMHG | HEART RATE: 78 BPM

## 2018-03-21 DIAGNOSIS — E78.00 PURE HYPERCHOLESTEROLEMIA: ICD-10-CM

## 2018-03-21 DIAGNOSIS — Z12.11 SCREENING FOR COLON CANCER: ICD-10-CM

## 2018-03-21 DIAGNOSIS — I25.10 CORONARY ARTERY DISEASE DUE TO LIPID RICH PLAQUE: ICD-10-CM

## 2018-03-21 DIAGNOSIS — I10 ESSENTIAL HYPERTENSION: Primary | ICD-10-CM

## 2018-03-21 DIAGNOSIS — I25.83 CORONARY ARTERY DISEASE DUE TO LIPID RICH PLAQUE: ICD-10-CM

## 2018-03-21 PROCEDURE — G8427 DOCREV CUR MEDS BY ELIG CLIN: HCPCS | Performed by: FAMILY MEDICINE

## 2018-03-21 PROCEDURE — 1123F ACP DISCUSS/DSCN MKR DOCD: CPT | Performed by: FAMILY MEDICINE

## 2018-03-21 PROCEDURE — 4040F PNEUMOC VAC/ADMIN/RCVD: CPT | Performed by: FAMILY MEDICINE

## 2018-03-21 PROCEDURE — G8417 CALC BMI ABV UP PARAM F/U: HCPCS | Performed by: FAMILY MEDICINE

## 2018-03-21 PROCEDURE — 99214 OFFICE O/P EST MOD 30 MIN: CPT | Performed by: FAMILY MEDICINE

## 2018-03-21 PROCEDURE — G8598 ASA/ANTIPLAT THER USED: HCPCS | Performed by: FAMILY MEDICINE

## 2018-03-21 PROCEDURE — 93798 PHYS/QHP OP CAR RHAB W/ECG: CPT

## 2018-03-21 PROCEDURE — G8484 FLU IMMUNIZE NO ADMIN: HCPCS | Performed by: FAMILY MEDICINE

## 2018-03-21 PROCEDURE — 1036F TOBACCO NON-USER: CPT | Performed by: FAMILY MEDICINE

## 2018-03-21 PROCEDURE — 3017F COLORECTAL CA SCREEN DOC REV: CPT | Performed by: FAMILY MEDICINE

## 2018-03-21 RX ORDER — ATORVASTATIN CALCIUM 40 MG/1
40 TABLET, FILM COATED ORAL NIGHTLY
Qty: 90 TABLET | Refills: 1 | Status: SHIPPED | OUTPATIENT
Start: 2018-03-21 | End: 2018-05-14

## 2018-03-21 RX ORDER — ISOSORBIDE MONONITRATE 30 MG/1
30 TABLET, EXTENDED RELEASE ORAL DAILY
Qty: 90 TABLET | Refills: 1 | Status: SHIPPED | OUTPATIENT
Start: 2018-03-21 | End: 2018-09-20 | Stop reason: SDUPTHER

## 2018-03-21 RX ORDER — HYDRALAZINE HYDROCHLORIDE 25 MG/1
12.5 TABLET, FILM COATED ORAL EVERY 8 HOURS SCHEDULED
Qty: 90 TABLET | Refills: 1 | Status: SHIPPED | OUTPATIENT
Start: 2018-03-21 | End: 2018-06-18 | Stop reason: SDUPTHER

## 2018-03-21 RX ORDER — CARVEDILOL 6.25 MG/1
6.25 TABLET ORAL 2 TIMES DAILY WITH MEALS
COMMUNITY
End: 2018-03-21 | Stop reason: SDUPTHER

## 2018-03-21 RX ORDER — FUROSEMIDE 20 MG/1
20 TABLET ORAL 2 TIMES DAILY
Qty: 180 TABLET | Refills: 1 | Status: SHIPPED | OUTPATIENT
Start: 2018-03-21 | End: 2018-06-29 | Stop reason: SDUPTHER

## 2018-03-21 RX ORDER — CARVEDILOL 6.25 MG/1
6.25 TABLET ORAL 2 TIMES DAILY WITH MEALS
Qty: 180 TABLET | Refills: 1 | Status: SHIPPED | OUTPATIENT
Start: 2018-03-21 | End: 2018-08-27 | Stop reason: SDUPTHER

## 2018-03-21 ASSESSMENT — ENCOUNTER SYMPTOMS
VOMITING: 0
ABDOMINAL PAIN: 0
COUGH: 0
SHORTNESS OF BREATH: 0
TROUBLE SWALLOWING: 0
BLOOD IN STOOL: 0
EYE DISCHARGE: 0
NAUSEA: 0
EYE REDNESS: 0
DIARRHEA: 0
FACIAL SWELLING: 0
CONSTIPATION: 0

## 2018-03-21 NOTE — PROGRESS NOTES
extended release tablet Take 1 tablet by mouth daily 2/12/18  Yes Eduarda Funez MD   potassium chloride (KLOR-CON M) 10 MEQ extended release tablet Take 1 tablet by mouth 2 times daily 2/12/18  Yes Eduarda Funez MD   atorvastatin (LIPITOR) 40 MG tablet Take 1 tablet by mouth nightly 2/12/18  Yes Eduarda Funez MD   hydrALAZINE (APRESOLINE) 25 MG tablet Take 0.5 tablets by mouth every 8 hours 1/9/18  Yes Ceila Medina MD   Cholecalciferol (VITAMIN D3) 2000 UNITS CAPS Take 2,000 Units by mouth daily   Yes Historical Provider, MD   BEE POLLEN PO Take by mouth   Yes Historical Provider, MD   Fish Oil-Cholecalciferol (FISH OIL + D3 PO) Take by mouth   Yes Historical Provider, MD   Flaxseed, Linseed, (FLAX SEED OIL PO) Take by mouth   Yes Historical Provider, MD   Red Yeast Rice Extract (RED YEAST RICE PO) Take by mouth daily Doesn't know dose   Yes Historical Provider, MD   Multiple Vitamins-Minerals (THERAPEUTIC MULTIVITAMIN-MINERALS) tablet Take 1 tablet by mouth daily   Yes Historical Provider, MD   Ascorbic Acid (VITAMIN C) 500 MG tablet Take 500 mg by mouth daily   Yes Historical Provider, MD   vitamin E 1000 UNITS capsule Take 1,000 Units by mouth daily   Yes Historical Provider, MD   aspirin 81 MG tablet Take 81 mg by mouth daily Holding for surgery   Yes Historical Provider, MD        Allergies:       Lisinopril    Social History:     Tobacco:    reports that he has never smoked. He has never used smokeless tobacco.  Alcohol:      reports that he drinks about 1.2 oz of alcohol per week . Drug Use:  reports that he does not use drugs. Family History:     Family History   Problem Relation Age of Onset    Heart Disease Mother     High Cholesterol Mother     Heart Disease Father     High Cholesterol Father        Review of Systems:       Review of Systems   Constitutional: Negative for chills, fatigue and fever. HENT: Negative for congestion, facial swelling and trouble swallowing.     Eyes: Negative

## 2018-03-21 NOTE — PATIENT INSTRUCTIONS
SURVEY:    You may be receiving a survey from [facility] regarding your visit today. Please complete the survey to enable us to provide the highest quality of care to you and your family. If you cannot score us a very good on any question, please call the office to discuss how we could have made your experience a very good one. Thank you. DATE: MEDICATIONS TAKEN TODAY? BLOOD PRESSURE READING: HEART RATE/PULSE: BLOOD SUGAR #/FASTING?  COMMENTS

## 2018-03-23 ENCOUNTER — HOSPITAL ENCOUNTER (OUTPATIENT)
Dept: CARDIAC REHAB | Age: 68
Setting detail: THERAPIES SERIES
Discharge: HOME | End: 2018-03-23
Payer: MEDICARE

## 2018-03-23 ENCOUNTER — HOSPITAL ENCOUNTER (OUTPATIENT)
Age: 68
Discharge: HOME | End: 2018-03-23
Payer: MEDICARE

## 2018-03-23 ENCOUNTER — OFFICE VISIT (OUTPATIENT)
Dept: CARDIOLOGY CLINIC | Age: 68
End: 2018-03-23
Payer: MEDICARE

## 2018-03-23 VITALS
SYSTOLIC BLOOD PRESSURE: 120 MMHG | OXYGEN SATURATION: 97 % | WEIGHT: 211 LBS | DIASTOLIC BLOOD PRESSURE: 80 MMHG | BODY MASS INDEX: 26.37 KG/M2 | HEART RATE: 83 BPM

## 2018-03-23 DIAGNOSIS — E78.00 PURE HYPERCHOLESTEROLEMIA: ICD-10-CM

## 2018-03-23 DIAGNOSIS — I25.10 CORONARY ARTERY DISEASE DUE TO LIPID RICH PLAQUE: Primary | ICD-10-CM

## 2018-03-23 DIAGNOSIS — I25.83 CORONARY ARTERY DISEASE DUE TO LIPID RICH PLAQUE: Primary | ICD-10-CM

## 2018-03-23 DIAGNOSIS — I10 ESSENTIAL HYPERTENSION: ICD-10-CM

## 2018-03-23 DIAGNOSIS — I42.9 CARDIOMYOPATHY, UNSPECIFIED TYPE (HCC): ICD-10-CM

## 2018-03-23 DIAGNOSIS — I25.83 CORONARY ARTERY DISEASE DUE TO LIPID RICH PLAQUE: ICD-10-CM

## 2018-03-23 DIAGNOSIS — I48.92 ATRIAL FLUTTER, UNSPECIFIED TYPE (HCC): ICD-10-CM

## 2018-03-23 DIAGNOSIS — I25.10 CORONARY ARTERY DISEASE DUE TO LIPID RICH PLAQUE: ICD-10-CM

## 2018-03-23 DIAGNOSIS — E55.9 VITAMIN D DEFICIENCY: ICD-10-CM

## 2018-03-23 LAB
EKG ATRIAL RATE: 82 BPM
EKG P AXIS: 61 DEGREES
EKG P-R INTERVAL: 158 MS
EKG Q-T INTERVAL: 458 MS
EKG QRS DURATION: 114 MS
EKG QTC CALCULATION (BAZETT): 535 MS
EKG R AXIS: 20 DEGREES
EKG T AXIS: -101 DEGREES
EKG VENTRICULAR RATE: 82 BPM

## 2018-03-23 PROCEDURE — 3017F COLORECTAL CA SCREEN DOC REV: CPT | Performed by: INTERNAL MEDICINE

## 2018-03-23 PROCEDURE — 93005 ELECTROCARDIOGRAM TRACING: CPT

## 2018-03-23 PROCEDURE — G8598 ASA/ANTIPLAT THER USED: HCPCS | Performed by: INTERNAL MEDICINE

## 2018-03-23 PROCEDURE — G8417 CALC BMI ABV UP PARAM F/U: HCPCS | Performed by: INTERNAL MEDICINE

## 2018-03-23 PROCEDURE — 1036F TOBACCO NON-USER: CPT | Performed by: INTERNAL MEDICINE

## 2018-03-23 PROCEDURE — 99213 OFFICE O/P EST LOW 20 MIN: CPT | Performed by: INTERNAL MEDICINE

## 2018-03-23 PROCEDURE — G8484 FLU IMMUNIZE NO ADMIN: HCPCS | Performed by: INTERNAL MEDICINE

## 2018-03-23 PROCEDURE — 4040F PNEUMOC VAC/ADMIN/RCVD: CPT | Performed by: INTERNAL MEDICINE

## 2018-03-23 PROCEDURE — G8427 DOCREV CUR MEDS BY ELIG CLIN: HCPCS | Performed by: INTERNAL MEDICINE

## 2018-03-23 PROCEDURE — 1123F ACP DISCUSS/DSCN MKR DOCD: CPT | Performed by: INTERNAL MEDICINE

## 2018-03-26 ENCOUNTER — HOSPITAL ENCOUNTER (OUTPATIENT)
Dept: CARDIAC REHAB | Age: 68
Setting detail: THERAPIES SERIES
Discharge: HOME | End: 2018-03-26
Payer: MEDICARE

## 2018-03-26 ENCOUNTER — TELEPHONE (OUTPATIENT)
Dept: CARDIOLOGY CLINIC | Age: 68
End: 2018-03-26

## 2018-03-26 DIAGNOSIS — Z12.11 SCREENING FOR COLON CANCER: ICD-10-CM

## 2018-03-26 LAB
CONTROL: PRESENT
HEMOCCULT STL QL: NEGATIVE

## 2018-03-26 PROCEDURE — 93798 PHYS/QHP OP CAR RHAB W/ECG: CPT

## 2018-03-26 PROCEDURE — 82274 ASSAY TEST FOR BLOOD FECAL: CPT | Performed by: FAMILY MEDICINE

## 2018-03-26 NOTE — TELEPHONE ENCOUNTER
Patient stated that due to Xarelto being Tier 3 and the cost being SO HIGH he would like his prescription changed to coumadin and sent to [facility]. In the future he would like ONLY tier 1 and tier 2 medications he can not afford tier 3. Thanks.

## 2018-03-28 ENCOUNTER — HOSPITAL ENCOUNTER (OUTPATIENT)
Dept: CARDIAC REHAB | Age: 68
Setting detail: THERAPIES SERIES
Discharge: HOME | End: 2018-03-28
Payer: MEDICARE

## 2018-03-28 PROCEDURE — 93798 PHYS/QHP OP CAR RHAB W/ECG: CPT

## 2018-03-30 ENCOUNTER — APPOINTMENT (OUTPATIENT)
Dept: CARDIAC REHAB | Age: 68
End: 2018-03-30
Payer: MEDICARE

## 2018-04-03 ENCOUNTER — HOSPITAL ENCOUNTER (OUTPATIENT)
Dept: PHARMACY | Age: 68
Setting detail: THERAPIES SERIES
Discharge: HOME | End: 2018-04-03
Payer: MEDICARE

## 2018-04-03 VITALS
SYSTOLIC BLOOD PRESSURE: 112 MMHG | DIASTOLIC BLOOD PRESSURE: 80 MMHG | WEIGHT: 222.7 LBS | BODY MASS INDEX: 27.84 KG/M2 | HEART RATE: 68 BPM

## 2018-04-03 DIAGNOSIS — Z79.01 LONG TERM CURRENT USE OF ANTICOAGULANTS WITH INR GOAL OF 2.0-3.0: ICD-10-CM

## 2018-04-03 DIAGNOSIS — I48.92 ATRIAL FLUTTER WITH RAPID VENTRICULAR RESPONSE (HCC): ICD-10-CM

## 2018-04-03 LAB — INR BLD: 2.2

## 2018-04-03 RX ORDER — ASPIRIN 81 MG/1
81 TABLET ORAL DAILY
COMMUNITY

## 2018-04-04 ENCOUNTER — HOSPITAL ENCOUNTER (OUTPATIENT)
Dept: CARDIAC REHAB | Age: 68
Setting detail: THERAPIES SERIES
Discharge: HOME | End: 2018-04-04
Payer: MEDICARE

## 2018-04-04 PROCEDURE — 93798 PHYS/QHP OP CAR RHAB W/ECG: CPT

## 2018-04-06 ENCOUNTER — APPOINTMENT (OUTPATIENT)
Dept: CARDIAC REHAB | Age: 68
End: 2018-04-06
Payer: MEDICARE

## 2018-04-06 ENCOUNTER — HOSPITAL ENCOUNTER (OUTPATIENT)
Dept: PHARMACY | Age: 68
Setting detail: THERAPIES SERIES
Discharge: HOME | End: 2018-04-06
Payer: MEDICARE

## 2018-04-06 VITALS — DIASTOLIC BLOOD PRESSURE: 73 MMHG | HEART RATE: 100 BPM | SYSTOLIC BLOOD PRESSURE: 116 MMHG

## 2018-04-06 DIAGNOSIS — I48.92 ATRIAL FLUTTER WITH RAPID VENTRICULAR RESPONSE (HCC): ICD-10-CM

## 2018-04-06 DIAGNOSIS — Z79.01 LONG TERM CURRENT USE OF ANTICOAGULANTS WITH INR GOAL OF 2.0-3.0: ICD-10-CM

## 2018-04-06 LAB — INR BLD: 2.7

## 2018-04-06 PROCEDURE — 85610 PROTHROMBIN TIME: CPT

## 2018-04-06 PROCEDURE — 99211 OFF/OP EST MAY X REQ PHY/QHP: CPT

## 2018-04-06 RX ORDER — WARFARIN SODIUM 6 MG/1
TABLET ORAL SEE ADMIN INSTRUCTIONS
Refills: 0 | COMMUNITY
Start: 2018-03-30 | End: 2018-06-01 | Stop reason: SDUPTHER

## 2018-04-09 ENCOUNTER — HOSPITAL ENCOUNTER (OUTPATIENT)
Dept: CARDIAC REHAB | Age: 68
Setting detail: THERAPIES SERIES
Discharge: HOME | End: 2018-04-09
Payer: MEDICARE

## 2018-04-09 PROCEDURE — 93798 PHYS/QHP OP CAR RHAB W/ECG: CPT

## 2018-04-11 ENCOUNTER — HOSPITAL ENCOUNTER (OUTPATIENT)
Dept: CARDIAC REHAB | Age: 68
Setting detail: THERAPIES SERIES
Discharge: HOME | End: 2018-04-11
Payer: MEDICARE

## 2018-04-11 PROCEDURE — 93798 PHYS/QHP OP CAR RHAB W/ECG: CPT

## 2018-04-13 ENCOUNTER — HOSPITAL ENCOUNTER (OUTPATIENT)
Dept: CARDIAC REHAB | Age: 68
Setting detail: THERAPIES SERIES
Discharge: HOME | End: 2018-04-13
Payer: MEDICARE

## 2018-04-13 ENCOUNTER — HOSPITAL ENCOUNTER (OUTPATIENT)
Dept: PHARMACY | Age: 68
Setting detail: THERAPIES SERIES
Discharge: HOME | End: 2018-04-13
Payer: MEDICARE

## 2018-04-13 VITALS
SYSTOLIC BLOOD PRESSURE: 124 MMHG | HEART RATE: 76 BPM | WEIGHT: 216.8 LBS | DIASTOLIC BLOOD PRESSURE: 92 MMHG | BODY MASS INDEX: 27.1 KG/M2

## 2018-04-13 DIAGNOSIS — I48.92 ATRIAL FLUTTER WITH RAPID VENTRICULAR RESPONSE (HCC): ICD-10-CM

## 2018-04-13 DIAGNOSIS — Z79.01 LONG TERM CURRENT USE OF ANTICOAGULANTS WITH INR GOAL OF 2.0-3.0: ICD-10-CM

## 2018-04-13 LAB — INR BLD: 2.6

## 2018-04-13 PROCEDURE — 85610 PROTHROMBIN TIME: CPT

## 2018-04-13 PROCEDURE — 99211 OFF/OP EST MAY X REQ PHY/QHP: CPT

## 2018-04-16 ENCOUNTER — HOSPITAL ENCOUNTER (OUTPATIENT)
Age: 68
Discharge: HOME | End: 2018-04-16
Payer: MEDICARE

## 2018-04-16 ENCOUNTER — OFFICE VISIT (OUTPATIENT)
Dept: CARDIOLOGY CLINIC | Age: 68
End: 2018-04-16
Payer: MEDICARE

## 2018-04-16 ENCOUNTER — TELEPHONE (OUTPATIENT)
Dept: CARDIOLOGY CLINIC | Age: 68
End: 2018-04-16

## 2018-04-16 ENCOUNTER — HOSPITAL ENCOUNTER (OUTPATIENT)
Dept: CARDIAC REHAB | Age: 68
Setting detail: THERAPIES SERIES
Discharge: HOME | End: 2018-04-16
Payer: MEDICARE

## 2018-04-16 VITALS
SYSTOLIC BLOOD PRESSURE: 102 MMHG | OXYGEN SATURATION: 96 % | BODY MASS INDEX: 27.12 KG/M2 | DIASTOLIC BLOOD PRESSURE: 80 MMHG | HEART RATE: 72 BPM | WEIGHT: 217 LBS

## 2018-04-16 DIAGNOSIS — K92.1 BLACK STOOLS: ICD-10-CM

## 2018-04-16 DIAGNOSIS — I10 ESSENTIAL HYPERTENSION: Primary | ICD-10-CM

## 2018-04-16 DIAGNOSIS — I48.92 ATRIAL FLUTTER, UNSPECIFIED TYPE (HCC): ICD-10-CM

## 2018-04-16 DIAGNOSIS — K92.1 BLACK TARRY STOOLS: ICD-10-CM

## 2018-04-16 DIAGNOSIS — I25.10 CORONARY ARTERY DISEASE DUE TO LIPID RICH PLAQUE: ICD-10-CM

## 2018-04-16 DIAGNOSIS — E78.00 PURE HYPERCHOLESTEROLEMIA: ICD-10-CM

## 2018-04-16 DIAGNOSIS — E55.9 VITAMIN D DEFICIENCY: ICD-10-CM

## 2018-04-16 DIAGNOSIS — D64.9 ANEMIA, UNSPECIFIED TYPE: Primary | ICD-10-CM

## 2018-04-16 DIAGNOSIS — I25.83 CORONARY ARTERY DISEASE DUE TO LIPID RICH PLAQUE: ICD-10-CM

## 2018-04-16 DIAGNOSIS — D64.9 ANEMIA, UNSPECIFIED TYPE: ICD-10-CM

## 2018-04-16 DIAGNOSIS — I42.9 CARDIOMYOPATHY, UNSPECIFIED TYPE (HCC): ICD-10-CM

## 2018-04-16 DIAGNOSIS — I95.9 HYPOTENSION, UNSPECIFIED HYPOTENSION TYPE: ICD-10-CM

## 2018-04-16 DIAGNOSIS — Z98.890 H/O CAROTID ENDARTERECTOMY: ICD-10-CM

## 2018-04-16 LAB
ABSOLUTE EOS #: 0.2 K/UL (ref 0–0.4)
ABSOLUTE IMMATURE GRANULOCYTE: ABNORMAL K/UL (ref 0–0.3)
ABSOLUTE LYMPH #: 1.9 K/UL (ref 1–4.8)
ABSOLUTE MONO #: 0.6 K/UL (ref 0–1)
ALBUMIN SERPL-MCNC: 3.6 G/DL (ref 3.5–5.2)
ALBUMIN/GLOBULIN RATIO: ABNORMAL (ref 1–2.5)
ALP BLD-CCNC: 64 U/L (ref 40–129)
ALT SERPL-CCNC: 26 U/L (ref 5–41)
ANION GAP SERPL CALCULATED.3IONS-SCNC: 13 MMOL/L (ref 9–17)
AST SERPL-CCNC: 19 U/L
BASOPHILS # BLD: 1 % (ref 0–2)
BASOPHILS ABSOLUTE: 0.1 K/UL (ref 0–0.2)
BILIRUB SERPL-MCNC: 0.92 MG/DL (ref 0.3–1.2)
BUN BLDV-MCNC: 23 MG/DL (ref 8–23)
BUN/CREAT BLD: 18 (ref 9–20)
CALCIUM SERPL-MCNC: 8.6 MG/DL (ref 8.6–10.4)
CHLORIDE BLD-SCNC: 105 MMOL/L (ref 98–107)
CO2: 24 MMOL/L (ref 20–31)
CREAT SERPL-MCNC: 1.28 MG/DL (ref 0.7–1.2)
DIFFERENTIAL TYPE: YES
EKG ATRIAL RATE: 91 BPM
EKG Q-T INTERVAL: 456 MS
EKG QRS DURATION: 162 MS
EKG QTC CALCULATION (BAZETT): 579 MS
EKG R AXIS: -60 DEGREES
EKG T AXIS: 127 DEGREES
EKG VENTRICULAR RATE: 97 BPM
EOSINOPHILS RELATIVE PERCENT: 2 % (ref 0–5)
GFR AFRICAN AMERICAN: >60 ML/MIN
GFR NON-AFRICAN AMERICAN: 56 ML/MIN
GFR SERPL CREATININE-BSD FRML MDRD: ABNORMAL ML/MIN/{1.73_M2}
GFR SERPL CREATININE-BSD FRML MDRD: ABNORMAL ML/MIN/{1.73_M2}
GLUCOSE BLD-MCNC: 79 MG/DL (ref 70–99)
HCT VFR BLD CALC: 42.1 % (ref 41–53)
HEMOGLOBIN: 14.4 G/DL (ref 13.5–17.5)
IMMATURE GRANULOCYTES: ABNORMAL %
LYMPHOCYTES # BLD: 25 % (ref 13–44)
MCH RBC QN AUTO: 31.4 PG (ref 26–34)
MCHC RBC AUTO-ENTMCNC: 34.2 G/DL (ref 31–37)
MCV RBC AUTO: 91.8 FL (ref 80–100)
MONOCYTES # BLD: 9 % (ref 5–9)
NRBC AUTOMATED: ABNORMAL PER 100 WBC
PDW BLD-RTO: 14.1 % (ref 12.1–15.2)
PLATELET # BLD: 137 K/UL (ref 140–450)
PLATELET ESTIMATE: ABNORMAL
PMV BLD AUTO: ABNORMAL FL (ref 6–12)
POTASSIUM SERPL-SCNC: 4.4 MMOL/L (ref 3.7–5.3)
RBC # BLD: 4.58 M/UL (ref 4.5–5.9)
RBC # BLD: ABNORMAL 10*6/UL
SEG NEUTROPHILS: 63 % (ref 39–75)
SEGMENTED NEUTROPHILS ABSOLUTE COUNT: 4.8 K/UL (ref 2.1–6.5)
SODIUM BLD-SCNC: 142 MMOL/L (ref 135–144)
TOTAL PROTEIN: 5.9 G/DL (ref 6.4–8.3)
WBC # BLD: 7.6 K/UL (ref 3.5–11)
WBC # BLD: ABNORMAL 10*3/UL

## 2018-04-16 PROCEDURE — 1036F TOBACCO NON-USER: CPT | Performed by: INTERNAL MEDICINE

## 2018-04-16 PROCEDURE — 93005 ELECTROCARDIOGRAM TRACING: CPT

## 2018-04-16 PROCEDURE — 1123F ACP DISCUSS/DSCN MKR DOCD: CPT | Performed by: INTERNAL MEDICINE

## 2018-04-16 PROCEDURE — G8428 CUR MEDS NOT DOCUMENT: HCPCS | Performed by: INTERNAL MEDICINE

## 2018-04-16 PROCEDURE — 36415 COLL VENOUS BLD VENIPUNCTURE: CPT

## 2018-04-16 PROCEDURE — 99212 OFFICE O/P EST SF 10 MIN: CPT | Performed by: INTERNAL MEDICINE

## 2018-04-16 PROCEDURE — 3017F COLORECTAL CA SCREEN DOC REV: CPT | Performed by: INTERNAL MEDICINE

## 2018-04-16 PROCEDURE — 85025 COMPLETE CBC W/AUTO DIFF WBC: CPT

## 2018-04-16 PROCEDURE — G8417 CALC BMI ABV UP PARAM F/U: HCPCS | Performed by: INTERNAL MEDICINE

## 2018-04-16 PROCEDURE — 80053 COMPREHEN METABOLIC PANEL: CPT

## 2018-04-16 PROCEDURE — G8598 ASA/ANTIPLAT THER USED: HCPCS | Performed by: INTERNAL MEDICINE

## 2018-04-16 PROCEDURE — 4040F PNEUMOC VAC/ADMIN/RCVD: CPT | Performed by: INTERNAL MEDICINE

## 2018-04-18 ENCOUNTER — HOSPITAL ENCOUNTER (OUTPATIENT)
Dept: CARDIAC REHAB | Age: 68
Setting detail: THERAPIES SERIES
Discharge: HOME | End: 2018-04-18
Payer: MEDICARE

## 2018-04-18 PROCEDURE — 93798 PHYS/QHP OP CAR RHAB W/ECG: CPT

## 2018-04-20 ENCOUNTER — HOSPITAL ENCOUNTER (OUTPATIENT)
Dept: CARDIAC REHAB | Age: 68
Setting detail: THERAPIES SERIES
Discharge: HOME | End: 2018-04-20
Payer: MEDICARE

## 2018-04-20 PROCEDURE — 93798 PHYS/QHP OP CAR RHAB W/ECG: CPT

## 2018-04-23 ENCOUNTER — HOSPITAL ENCOUNTER (OUTPATIENT)
Dept: CARDIAC REHAB | Age: 68
Setting detail: THERAPIES SERIES
Discharge: HOME | End: 2018-04-23
Payer: MEDICARE

## 2018-04-23 ENCOUNTER — OFFICE VISIT (OUTPATIENT)
Dept: CARDIOLOGY CLINIC | Age: 68
End: 2018-04-23
Payer: MEDICARE

## 2018-04-23 ENCOUNTER — HOSPITAL ENCOUNTER (OUTPATIENT)
Dept: PHARMACY | Age: 68
Setting detail: THERAPIES SERIES
Discharge: HOME | End: 2018-04-23
Payer: MEDICARE

## 2018-04-23 ENCOUNTER — HOSPITAL ENCOUNTER (OUTPATIENT)
Age: 68
Discharge: HOME | End: 2018-04-23
Payer: MEDICARE

## 2018-04-23 VITALS
HEART RATE: 60 BPM | OXYGEN SATURATION: 99 % | WEIGHT: 218 LBS | SYSTOLIC BLOOD PRESSURE: 130 MMHG | DIASTOLIC BLOOD PRESSURE: 70 MMHG | BODY MASS INDEX: 27.25 KG/M2

## 2018-04-23 DIAGNOSIS — K92.1 BLACK STOOLS: ICD-10-CM

## 2018-04-23 DIAGNOSIS — I10 ESSENTIAL HYPERTENSION: ICD-10-CM

## 2018-04-23 DIAGNOSIS — K92.1 BLACK STOOLS: Primary | ICD-10-CM

## 2018-04-23 DIAGNOSIS — Z79.01 LONG TERM CURRENT USE OF ANTICOAGULANTS WITH INR GOAL OF 2.0-3.0: ICD-10-CM

## 2018-04-23 DIAGNOSIS — I48.92 ATRIAL FLUTTER WITH RAPID VENTRICULAR RESPONSE (HCC): ICD-10-CM

## 2018-04-23 LAB
ABSOLUTE EOS #: 0.2 K/UL (ref 0–0.4)
ABSOLUTE IMMATURE GRANULOCYTE: NORMAL K/UL (ref 0–0.3)
ABSOLUTE LYMPH #: 1.7 K/UL (ref 1–4.8)
ABSOLUTE MONO #: 0.5 K/UL (ref 0–1)
BASOPHILS # BLD: 0 % (ref 0–2)
BASOPHILS ABSOLUTE: 0 K/UL (ref 0–0.2)
DIFFERENTIAL TYPE: YES
EOSINOPHILS RELATIVE PERCENT: 3 % (ref 0–5)
HCT VFR BLD CALC: 43.9 % (ref 41–53)
HEMOGLOBIN: 14.9 G/DL (ref 13.5–17.5)
IMMATURE GRANULOCYTES: NORMAL %
INR BLD: 1.5
LYMPHOCYTES # BLD: 26 % (ref 13–44)
MCH RBC QN AUTO: 31.5 PG (ref 26–34)
MCHC RBC AUTO-ENTMCNC: 33.9 G/DL (ref 31–37)
MCV RBC AUTO: 93 FL (ref 80–100)
MONOCYTES # BLD: 7 % (ref 5–9)
NRBC AUTOMATED: NORMAL PER 100 WBC
PDW BLD-RTO: 14.5 % (ref 12.1–15.2)
PLATELET # BLD: 143 K/UL (ref 140–450)
PLATELET ESTIMATE: NORMAL
PMV BLD AUTO: NORMAL FL (ref 6–12)
PROTHROMBIN TIME: 16 SEC (ref 9–11.6)
RBC # BLD: 4.72 M/UL (ref 4.5–5.9)
RBC # BLD: NORMAL 10*6/UL
SEG NEUTROPHILS: 64 % (ref 39–75)
SEGMENTED NEUTROPHILS ABSOLUTE COUNT: 4.2 K/UL (ref 2.1–6.5)
WBC # BLD: 6.6 K/UL (ref 3.5–11)
WBC # BLD: NORMAL 10*3/UL

## 2018-04-23 PROCEDURE — G8427 DOCREV CUR MEDS BY ELIG CLIN: HCPCS | Performed by: INTERNAL MEDICINE

## 2018-04-23 PROCEDURE — 99211 OFF/OP EST MAY X REQ PHY/QHP: CPT

## 2018-04-23 PROCEDURE — 36415 COLL VENOUS BLD VENIPUNCTURE: CPT

## 2018-04-23 PROCEDURE — G8598 ASA/ANTIPLAT THER USED: HCPCS | Performed by: INTERNAL MEDICINE

## 2018-04-23 PROCEDURE — 85025 COMPLETE CBC W/AUTO DIFF WBC: CPT

## 2018-04-23 PROCEDURE — 4040F PNEUMOC VAC/ADMIN/RCVD: CPT | Performed by: INTERNAL MEDICINE

## 2018-04-23 PROCEDURE — 3017F COLORECTAL CA SCREEN DOC REV: CPT | Performed by: INTERNAL MEDICINE

## 2018-04-23 PROCEDURE — G8417 CALC BMI ABV UP PARAM F/U: HCPCS | Performed by: INTERNAL MEDICINE

## 2018-04-23 PROCEDURE — 99212 OFFICE O/P EST SF 10 MIN: CPT | Performed by: INTERNAL MEDICINE

## 2018-04-23 PROCEDURE — 1123F ACP DISCUSS/DSCN MKR DOCD: CPT | Performed by: INTERNAL MEDICINE

## 2018-04-23 PROCEDURE — 85610 PROTHROMBIN TIME: CPT

## 2018-04-23 PROCEDURE — 1036F TOBACCO NON-USER: CPT | Performed by: INTERNAL MEDICINE

## 2018-04-24 ENCOUNTER — HOSPITAL ENCOUNTER (OUTPATIENT)
Age: 68
Setting detail: SPECIMEN
Discharge: HOME | End: 2018-04-24
Payer: MEDICARE

## 2018-04-24 LAB
DATE, STOOL #1: NORMAL
DATE, STOOL #2: NORMAL
DATE, STOOL #3: NORMAL
HEMOCCULT SP1 STL QL: NEGATIVE
HEMOCCULT SP2 STL QL: NORMAL
HEMOCCULT SP3 STL QL: NORMAL
TIME, STOOL #1: 855
TIME, STOOL #2: NORMAL
TIME, STOOL #3: NORMAL

## 2018-04-24 PROCEDURE — G0328 FECAL BLOOD SCRN IMMUNOASSAY: HCPCS

## 2018-04-25 ENCOUNTER — HOSPITAL ENCOUNTER (OUTPATIENT)
Dept: CARDIAC REHAB | Age: 68
Setting detail: THERAPIES SERIES
Discharge: HOME | End: 2018-04-25
Payer: MEDICARE

## 2018-04-25 PROCEDURE — 93798 PHYS/QHP OP CAR RHAB W/ECG: CPT

## 2018-04-27 ENCOUNTER — APPOINTMENT (OUTPATIENT)
Dept: CARDIAC REHAB | Age: 68
End: 2018-04-27
Payer: MEDICARE

## 2018-04-27 ENCOUNTER — HOSPITAL ENCOUNTER (OUTPATIENT)
Dept: PHARMACY | Age: 68
Setting detail: THERAPIES SERIES
Discharge: HOME | End: 2018-04-27
Payer: MEDICARE

## 2018-04-27 VITALS
SYSTOLIC BLOOD PRESSURE: 126 MMHG | DIASTOLIC BLOOD PRESSURE: 88 MMHG | WEIGHT: 225.8 LBS | BODY MASS INDEX: 28.22 KG/M2 | HEART RATE: 68 BPM

## 2018-04-27 DIAGNOSIS — Z79.01 LONG TERM CURRENT USE OF ANTICOAGULANTS WITH INR GOAL OF 2.0-3.0: ICD-10-CM

## 2018-04-27 DIAGNOSIS — I48.92 ATRIAL FLUTTER WITH RAPID VENTRICULAR RESPONSE (HCC): ICD-10-CM

## 2018-04-27 LAB — INR BLD: 1.5

## 2018-04-27 PROCEDURE — 99211 OFF/OP EST MAY X REQ PHY/QHP: CPT

## 2018-04-27 PROCEDURE — 85610 PROTHROMBIN TIME: CPT

## 2018-04-30 ENCOUNTER — HOSPITAL ENCOUNTER (OUTPATIENT)
Dept: CARDIAC REHAB | Age: 68
Setting detail: THERAPIES SERIES
Discharge: HOME | End: 2018-04-30
Payer: MEDICARE

## 2018-04-30 PROCEDURE — 93798 PHYS/QHP OP CAR RHAB W/ECG: CPT

## 2018-05-02 ENCOUNTER — HOSPITAL ENCOUNTER (OUTPATIENT)
Dept: CARDIAC REHAB | Age: 68
Setting detail: THERAPIES SERIES
Discharge: HOME | End: 2018-05-02
Payer: MEDICARE

## 2018-05-02 PROCEDURE — 93798 PHYS/QHP OP CAR RHAB W/ECG: CPT

## 2018-05-04 ENCOUNTER — HOSPITAL ENCOUNTER (OUTPATIENT)
Dept: PHARMACY | Age: 68
Setting detail: THERAPIES SERIES
Discharge: HOME | End: 2018-05-04
Payer: MEDICARE

## 2018-05-04 ENCOUNTER — HOSPITAL ENCOUNTER (OUTPATIENT)
Dept: CARDIAC REHAB | Age: 68
Setting detail: THERAPIES SERIES
Discharge: HOME | End: 2018-05-04
Payer: MEDICARE

## 2018-05-04 VITALS
DIASTOLIC BLOOD PRESSURE: 78 MMHG | SYSTOLIC BLOOD PRESSURE: 106 MMHG | BODY MASS INDEX: 27.25 KG/M2 | HEART RATE: 84 BPM | WEIGHT: 218 LBS

## 2018-05-04 DIAGNOSIS — Z79.01 LONG TERM CURRENT USE OF ANTICOAGULANTS WITH INR GOAL OF 2.0-3.0: ICD-10-CM

## 2018-05-04 DIAGNOSIS — I48.92 ATRIAL FLUTTER WITH RAPID VENTRICULAR RESPONSE (HCC): ICD-10-CM

## 2018-05-04 LAB — INTERNATIONAL NORMALIZATION RATIO, POC: 2.7

## 2018-05-04 PROCEDURE — 85610 PROTHROMBIN TIME: CPT

## 2018-05-04 PROCEDURE — 93798 PHYS/QHP OP CAR RHAB W/ECG: CPT

## 2018-05-04 PROCEDURE — 99211 OFF/OP EST MAY X REQ PHY/QHP: CPT

## 2018-05-07 ENCOUNTER — HOSPITAL ENCOUNTER (OUTPATIENT)
Dept: CARDIAC REHAB | Age: 68
Setting detail: THERAPIES SERIES
Discharge: HOME | End: 2018-05-07
Payer: MEDICARE

## 2018-05-09 ENCOUNTER — HOSPITAL ENCOUNTER (OUTPATIENT)
Dept: CARDIAC REHAB | Age: 68
Setting detail: THERAPIES SERIES
Discharge: HOME | End: 2018-05-09
Payer: MEDICARE

## 2018-05-11 ENCOUNTER — HOSPITAL ENCOUNTER (OUTPATIENT)
Dept: CARDIAC REHAB | Age: 68
Setting detail: THERAPIES SERIES
Discharge: HOME | End: 2018-05-11
Payer: MEDICARE

## 2018-05-14 ENCOUNTER — HOSPITAL ENCOUNTER (OUTPATIENT)
Dept: PHARMACY | Age: 68
Setting detail: THERAPIES SERIES
Discharge: HOME | End: 2018-05-14
Payer: MEDICARE

## 2018-05-14 ENCOUNTER — HOSPITAL ENCOUNTER (OUTPATIENT)
Dept: CARDIAC REHAB | Age: 68
Setting detail: THERAPIES SERIES
Discharge: HOME | End: 2018-05-14
Payer: MEDICARE

## 2018-05-14 VITALS
BODY MASS INDEX: 26.72 KG/M2 | SYSTOLIC BLOOD PRESSURE: 111 MMHG | WEIGHT: 213.8 LBS | DIASTOLIC BLOOD PRESSURE: 68 MMHG | HEART RATE: 64 BPM

## 2018-05-14 DIAGNOSIS — Z79.01 LONG TERM CURRENT USE OF ANTICOAGULANTS WITH INR GOAL OF 2.0-3.0: ICD-10-CM

## 2018-05-14 DIAGNOSIS — I48.92 ATRIAL FLUTTER WITH RAPID VENTRICULAR RESPONSE (HCC): ICD-10-CM

## 2018-05-14 LAB — INR BLD: 2.1

## 2018-05-14 PROCEDURE — 99211 OFF/OP EST MAY X REQ PHY/QHP: CPT

## 2018-05-14 PROCEDURE — 93798 PHYS/QHP OP CAR RHAB W/ECG: CPT

## 2018-05-14 PROCEDURE — 85610 PROTHROMBIN TIME: CPT

## 2018-05-16 ENCOUNTER — HOSPITAL ENCOUNTER (OUTPATIENT)
Dept: CARDIAC REHAB | Age: 68
Setting detail: THERAPIES SERIES
Discharge: HOME | End: 2018-05-16
Payer: MEDICARE

## 2018-05-16 PROCEDURE — 93798 PHYS/QHP OP CAR RHAB W/ECG: CPT

## 2018-05-18 ENCOUNTER — HOSPITAL ENCOUNTER (OUTPATIENT)
Dept: CARDIAC REHAB | Age: 68
Setting detail: THERAPIES SERIES
Discharge: HOME | End: 2018-05-18
Payer: MEDICARE

## 2018-05-18 PROCEDURE — 93798 PHYS/QHP OP CAR RHAB W/ECG: CPT

## 2018-05-21 ENCOUNTER — APPOINTMENT (OUTPATIENT)
Dept: CARDIAC REHAB | Age: 68
End: 2018-05-21
Payer: MEDICARE

## 2018-05-21 ENCOUNTER — HOSPITAL ENCOUNTER (OUTPATIENT)
Dept: NON INVASIVE DIAGNOSTICS | Age: 68
Discharge: HOME | End: 2018-05-21
Payer: MEDICARE

## 2018-05-21 DIAGNOSIS — I25.10 CORONARY ARTERY DISEASE DUE TO LIPID RICH PLAQUE: ICD-10-CM

## 2018-05-21 DIAGNOSIS — E78.00 PURE HYPERCHOLESTEROLEMIA: ICD-10-CM

## 2018-05-21 DIAGNOSIS — I25.83 CORONARY ARTERY DISEASE DUE TO LIPID RICH PLAQUE: ICD-10-CM

## 2018-05-21 DIAGNOSIS — I42.9 CARDIOMYOPATHY, UNSPECIFIED TYPE (HCC): ICD-10-CM

## 2018-05-21 DIAGNOSIS — E55.9 VITAMIN D DEFICIENCY: ICD-10-CM

## 2018-05-21 DIAGNOSIS — I10 ESSENTIAL HYPERTENSION: ICD-10-CM

## 2018-05-21 LAB
LV EF: 23 %
LVEF MODALITY: NORMAL

## 2018-05-21 PROCEDURE — 93306 TTE W/DOPPLER COMPLETE: CPT

## 2018-05-23 ENCOUNTER — HOSPITAL ENCOUNTER (OUTPATIENT)
Dept: CARDIAC REHAB | Age: 68
Setting detail: THERAPIES SERIES
Discharge: HOME | End: 2018-05-23
Payer: MEDICARE

## 2018-05-23 PROCEDURE — 93798 PHYS/QHP OP CAR RHAB W/ECG: CPT

## 2018-05-25 ENCOUNTER — APPOINTMENT (OUTPATIENT)
Dept: CARDIAC REHAB | Age: 68
End: 2018-05-25
Payer: MEDICARE

## 2018-05-28 ENCOUNTER — APPOINTMENT (OUTPATIENT)
Dept: CARDIAC REHAB | Age: 68
End: 2018-05-28
Payer: MEDICARE

## 2018-05-30 ENCOUNTER — HOSPITAL ENCOUNTER (OUTPATIENT)
Dept: CARDIAC REHAB | Age: 68
Setting detail: THERAPIES SERIES
Discharge: HOME | End: 2018-05-30
Payer: MEDICARE

## 2018-05-30 PROCEDURE — 93798 PHYS/QHP OP CAR RHAB W/ECG: CPT

## 2018-05-31 ENCOUNTER — HOSPITAL ENCOUNTER (OUTPATIENT)
Dept: PHARMACY | Age: 68
Setting detail: THERAPIES SERIES
Discharge: HOME | End: 2018-05-31
Payer: MEDICARE

## 2018-05-31 ENCOUNTER — HOSPITAL ENCOUNTER (OUTPATIENT)
Dept: CARDIAC REHAB | Age: 68
Setting detail: THERAPIES SERIES
Discharge: HOME | End: 2018-05-31
Payer: MEDICARE

## 2018-05-31 ENCOUNTER — OFFICE VISIT (OUTPATIENT)
Dept: CARDIOLOGY CLINIC | Age: 68
End: 2018-05-31
Payer: MEDICARE

## 2018-05-31 VITALS
WEIGHT: 213 LBS | DIASTOLIC BLOOD PRESSURE: 74 MMHG | HEART RATE: 74 BPM | SYSTOLIC BLOOD PRESSURE: 106 MMHG | BODY MASS INDEX: 26.62 KG/M2

## 2018-05-31 VITALS
OXYGEN SATURATION: 99 % | DIASTOLIC BLOOD PRESSURE: 70 MMHG | WEIGHT: 218 LBS | HEART RATE: 72 BPM | BODY MASS INDEX: 27.25 KG/M2 | SYSTOLIC BLOOD PRESSURE: 120 MMHG

## 2018-05-31 DIAGNOSIS — Z79.01 LONG TERM CURRENT USE OF ANTICOAGULANTS WITH INR GOAL OF 2.0-3.0: ICD-10-CM

## 2018-05-31 DIAGNOSIS — Z95.1 S/P CABG X 2: Primary | ICD-10-CM

## 2018-05-31 DIAGNOSIS — E55.9 VITAMIN D DEFICIENCY: ICD-10-CM

## 2018-05-31 DIAGNOSIS — I10 ESSENTIAL HYPERTENSION: ICD-10-CM

## 2018-05-31 DIAGNOSIS — E78.00 PURE HYPERCHOLESTEROLEMIA: ICD-10-CM

## 2018-05-31 DIAGNOSIS — I50.21 CHF (CONGESTIVE HEART FAILURE), NYHA CLASS IV, ACUTE, SYSTOLIC (HCC): ICD-10-CM

## 2018-05-31 DIAGNOSIS — I25.83 CORONARY ARTERY DISEASE DUE TO LIPID RICH PLAQUE: ICD-10-CM

## 2018-05-31 DIAGNOSIS — I48.92 ATRIAL FLUTTER WITH RAPID VENTRICULAR RESPONSE (HCC): ICD-10-CM

## 2018-05-31 DIAGNOSIS — I25.10 CORONARY ARTERY DISEASE DUE TO LIPID RICH PLAQUE: ICD-10-CM

## 2018-05-31 DIAGNOSIS — I42.9 CARDIOMYOPATHY, UNSPECIFIED TYPE (HCC): ICD-10-CM

## 2018-05-31 LAB — INR BLD: 1.3

## 2018-05-31 PROCEDURE — 1123F ACP DISCUSS/DSCN MKR DOCD: CPT | Performed by: INTERNAL MEDICINE

## 2018-05-31 PROCEDURE — 99211 OFF/OP EST MAY X REQ PHY/QHP: CPT

## 2018-05-31 PROCEDURE — 99212 OFFICE O/P EST SF 10 MIN: CPT | Performed by: INTERNAL MEDICINE

## 2018-05-31 PROCEDURE — 85610 PROTHROMBIN TIME: CPT

## 2018-05-31 PROCEDURE — G8427 DOCREV CUR MEDS BY ELIG CLIN: HCPCS | Performed by: INTERNAL MEDICINE

## 2018-05-31 PROCEDURE — 3017F COLORECTAL CA SCREEN DOC REV: CPT | Performed by: INTERNAL MEDICINE

## 2018-05-31 PROCEDURE — 93798 PHYS/QHP OP CAR RHAB W/ECG: CPT

## 2018-05-31 PROCEDURE — 4040F PNEUMOC VAC/ADMIN/RCVD: CPT | Performed by: INTERNAL MEDICINE

## 2018-05-31 PROCEDURE — G8598 ASA/ANTIPLAT THER USED: HCPCS | Performed by: INTERNAL MEDICINE

## 2018-05-31 PROCEDURE — 1036F TOBACCO NON-USER: CPT | Performed by: INTERNAL MEDICINE

## 2018-05-31 PROCEDURE — G8417 CALC BMI ABV UP PARAM F/U: HCPCS | Performed by: INTERNAL MEDICINE

## 2018-06-01 ENCOUNTER — HOSPITAL ENCOUNTER (OUTPATIENT)
Dept: CARDIAC REHAB | Age: 68
Setting detail: THERAPIES SERIES
Discharge: HOME | End: 2018-06-01
Payer: MEDICARE

## 2018-06-01 RX ORDER — WARFARIN SODIUM 6 MG/1
TABLET ORAL
Qty: 30 TABLET | Refills: 2 | OUTPATIENT
Start: 2018-06-01 | End: 2018-06-29 | Stop reason: DRUGHIGH

## 2018-06-04 ENCOUNTER — TELEPHONE (OUTPATIENT)
Dept: CARDIOLOGY CLINIC | Age: 68
End: 2018-06-04

## 2018-06-04 ENCOUNTER — HOSPITAL ENCOUNTER (OUTPATIENT)
Dept: CARDIAC REHAB | Age: 68
Setting detail: THERAPIES SERIES
Discharge: HOME | End: 2018-06-04
Payer: MEDICARE

## 2018-06-04 PROCEDURE — 93798 PHYS/QHP OP CAR RHAB W/ECG: CPT

## 2018-06-06 ENCOUNTER — HOSPITAL ENCOUNTER (OUTPATIENT)
Dept: CARDIAC REHAB | Age: 68
Setting detail: THERAPIES SERIES
Discharge: HOME | End: 2018-06-06
Payer: MEDICARE

## 2018-06-06 PROCEDURE — 93798 PHYS/QHP OP CAR RHAB W/ECG: CPT

## 2018-06-08 ENCOUNTER — HOSPITAL ENCOUNTER (OUTPATIENT)
Dept: CARDIAC REHAB | Age: 68
Setting detail: THERAPIES SERIES
Discharge: HOME | End: 2018-06-08
Payer: MEDICARE

## 2018-06-08 ENCOUNTER — HOSPITAL ENCOUNTER (OUTPATIENT)
Dept: PHARMACY | Age: 68
Setting detail: THERAPIES SERIES
Discharge: HOME | End: 2018-06-08
Payer: MEDICARE

## 2018-06-08 VITALS
SYSTOLIC BLOOD PRESSURE: 99 MMHG | BODY MASS INDEX: 26.47 KG/M2 | WEIGHT: 211.8 LBS | HEART RATE: 106 BPM | DIASTOLIC BLOOD PRESSURE: 57 MMHG

## 2018-06-08 DIAGNOSIS — I48.92 ATRIAL FLUTTER WITH RAPID VENTRICULAR RESPONSE (HCC): ICD-10-CM

## 2018-06-08 DIAGNOSIS — Z79.01 LONG TERM CURRENT USE OF ANTICOAGULANTS WITH INR GOAL OF 2.0-3.0: ICD-10-CM

## 2018-06-08 LAB — INR BLD: 4.2

## 2018-06-08 PROCEDURE — 99211 OFF/OP EST MAY X REQ PHY/QHP: CPT

## 2018-06-08 PROCEDURE — 93798 PHYS/QHP OP CAR RHAB W/ECG: CPT

## 2018-06-08 PROCEDURE — 85610 PROTHROMBIN TIME: CPT

## 2018-06-11 ENCOUNTER — HOSPITAL ENCOUNTER (OUTPATIENT)
Dept: CARDIAC REHAB | Age: 68
Setting detail: THERAPIES SERIES
Discharge: HOME | End: 2018-06-11
Payer: MEDICARE

## 2018-06-13 ENCOUNTER — HOSPITAL ENCOUNTER (OUTPATIENT)
Dept: CARDIAC REHAB | Age: 68
Setting detail: THERAPIES SERIES
Discharge: HOME | End: 2018-06-13
Payer: MEDICARE

## 2018-06-13 PROCEDURE — 93798 PHYS/QHP OP CAR RHAB W/ECG: CPT

## 2018-06-15 ENCOUNTER — HOSPITAL ENCOUNTER (OUTPATIENT)
Dept: PHARMACY | Age: 68
Setting detail: THERAPIES SERIES
Discharge: HOME | End: 2018-06-15
Payer: MEDICARE

## 2018-06-15 ENCOUNTER — HOSPITAL ENCOUNTER (OUTPATIENT)
Dept: CARDIAC REHAB | Age: 68
Setting detail: THERAPIES SERIES
Discharge: HOME | End: 2018-06-15
Payer: MEDICARE

## 2018-06-15 VITALS
SYSTOLIC BLOOD PRESSURE: 119 MMHG | BODY MASS INDEX: 26.5 KG/M2 | HEART RATE: 52 BPM | DIASTOLIC BLOOD PRESSURE: 67 MMHG | WEIGHT: 212 LBS

## 2018-06-15 DIAGNOSIS — Z79.01 LONG TERM CURRENT USE OF ANTICOAGULANTS WITH INR GOAL OF 2.0-3.0: ICD-10-CM

## 2018-06-15 DIAGNOSIS — I48.92 ATRIAL FLUTTER WITH RAPID VENTRICULAR RESPONSE (HCC): ICD-10-CM

## 2018-06-15 LAB — INR BLD: 3.1

## 2018-06-15 PROCEDURE — 93798 PHYS/QHP OP CAR RHAB W/ECG: CPT

## 2018-06-15 PROCEDURE — 85610 PROTHROMBIN TIME: CPT

## 2018-06-15 PROCEDURE — 99211 OFF/OP EST MAY X REQ PHY/QHP: CPT

## 2018-06-18 RX ORDER — HYDRALAZINE HYDROCHLORIDE 25 MG/1
TABLET, FILM COATED ORAL
Qty: 135 TABLET | Refills: 0 | Status: SHIPPED | OUTPATIENT
Start: 2018-06-18 | End: 2018-09-17 | Stop reason: SDUPTHER

## 2018-06-25 ENCOUNTER — TELEPHONE (OUTPATIENT)
Dept: FAMILY MEDICINE CLINIC | Age: 68
End: 2018-06-25

## 2018-06-25 DIAGNOSIS — M25.572 ACUTE LEFT ANKLE PAIN: Primary | ICD-10-CM

## 2018-06-28 ENCOUNTER — APPOINTMENT (OUTPATIENT)
Dept: PHARMACY | Age: 68
End: 2018-06-28
Payer: MEDICARE

## 2018-06-29 ENCOUNTER — HOSPITAL ENCOUNTER (OUTPATIENT)
Dept: PHARMACY | Age: 68
Setting detail: THERAPIES SERIES
Discharge: HOME | End: 2018-06-29
Payer: MEDICARE

## 2018-06-29 VITALS
WEIGHT: 219.2 LBS | HEART RATE: 98 BPM | BODY MASS INDEX: 27.4 KG/M2 | SYSTOLIC BLOOD PRESSURE: 130 MMHG | DIASTOLIC BLOOD PRESSURE: 74 MMHG

## 2018-06-29 DIAGNOSIS — I48.92 ATRIAL FLUTTER WITH RAPID VENTRICULAR RESPONSE (HCC): ICD-10-CM

## 2018-06-29 DIAGNOSIS — Z79.01 LONG TERM CURRENT USE OF ANTICOAGULANTS WITH INR GOAL OF 2.0-3.0: ICD-10-CM

## 2018-06-29 LAB — INR BLD: 2.1

## 2018-06-29 PROCEDURE — 85610 PROTHROMBIN TIME: CPT

## 2018-06-29 PROCEDURE — 99211 OFF/OP EST MAY X REQ PHY/QHP: CPT

## 2018-06-29 RX ORDER — FUROSEMIDE 20 MG/1
20 TABLET ORAL 2 TIMES DAILY
Qty: 30 TABLET | Refills: 0 | Status: SHIPPED | OUTPATIENT
Start: 2018-06-29 | End: 2018-07-20 | Stop reason: DRUGHIGH

## 2018-06-29 RX ORDER — WARFARIN SODIUM 6 MG/1
TABLET ORAL
Qty: 30 TABLET | Refills: 2 | Status: SHIPPED
Start: 2018-06-29 | End: 2018-07-31 | Stop reason: SDUPTHER

## 2018-06-29 RX ORDER — FUROSEMIDE 20 MG/1
20 TABLET ORAL 2 TIMES DAILY
Qty: 180 TABLET | Refills: 3 | Status: SHIPPED | OUTPATIENT
Start: 2018-06-29 | End: 2018-07-20 | Stop reason: DRUGHIGH

## 2018-07-20 ENCOUNTER — HOSPITAL ENCOUNTER (OUTPATIENT)
Dept: PHARMACY | Age: 68
Setting detail: THERAPIES SERIES
Discharge: HOME | End: 2018-07-20
Payer: MEDICARE

## 2018-07-20 VITALS
HEART RATE: 68 BPM | WEIGHT: 217.2 LBS | SYSTOLIC BLOOD PRESSURE: 132 MMHG | DIASTOLIC BLOOD PRESSURE: 80 MMHG | BODY MASS INDEX: 27.15 KG/M2

## 2018-07-20 DIAGNOSIS — I48.92 ATRIAL FLUTTER WITH RAPID VENTRICULAR RESPONSE (HCC): ICD-10-CM

## 2018-07-20 DIAGNOSIS — Z79.01 LONG TERM CURRENT USE OF ANTICOAGULANTS WITH INR GOAL OF 2.0-3.0: ICD-10-CM

## 2018-07-20 LAB — INR BLD: 2

## 2018-07-20 PROCEDURE — 85610 PROTHROMBIN TIME: CPT

## 2018-07-20 PROCEDURE — 99211 OFF/OP EST MAY X REQ PHY/QHP: CPT

## 2018-07-20 RX ORDER — FUROSEMIDE 20 MG/1
20 TABLET ORAL 3 TIMES DAILY
COMMUNITY
End: 2018-09-04 | Stop reason: SDUPTHER

## 2018-07-20 NOTE — PROGRESS NOTES
Fingerstick INR drawn per clinic protocol. Patient states no visible blood in urine and no black tarry stool. Denies any missed doses of warfarin. No change in other maintenance medications or in diet. Will continue current warfarin regimen and recheck INR in 4 weeks. Patient acknowledges working in consult agreement with pharmacist as referred by his/her physician.

## 2018-07-31 RX ORDER — WARFARIN SODIUM 6 MG/1
TABLET ORAL
Qty: 90 TABLET | Refills: 3 | Status: SHIPPED | OUTPATIENT
Start: 2018-07-31 | End: 2018-09-27 | Stop reason: DRUGHIGH

## 2018-08-17 ENCOUNTER — HOSPITAL ENCOUNTER (OUTPATIENT)
Dept: PHARMACY | Age: 68
Setting detail: THERAPIES SERIES
Discharge: HOME | End: 2018-08-17
Payer: MEDICARE

## 2018-08-17 VITALS
WEIGHT: 224.6 LBS | SYSTOLIC BLOOD PRESSURE: 96 MMHG | DIASTOLIC BLOOD PRESSURE: 54 MMHG | BODY MASS INDEX: 28.07 KG/M2 | HEART RATE: 72 BPM

## 2018-08-17 DIAGNOSIS — I48.92 ATRIAL FLUTTER WITH RAPID VENTRICULAR RESPONSE (HCC): ICD-10-CM

## 2018-08-17 DIAGNOSIS — Z79.01 LONG TERM CURRENT USE OF ANTICOAGULANTS WITH INR GOAL OF 2.0-3.0: ICD-10-CM

## 2018-08-17 LAB — INR BLD: 1.8

## 2018-08-17 PROCEDURE — 99211 OFF/OP EST MAY X REQ PHY/QHP: CPT

## 2018-08-17 PROCEDURE — 85610 PROTHROMBIN TIME: CPT

## 2018-08-17 NOTE — PROGRESS NOTES
Fingerstick INR drawn per clinic protocol. Patient states no visible blood in urine and no black tarry stool. Denies any missed doses of warfarin. No change in other maintenance medications. Upon reviewing his diet, Jairo Tyler says he has been \"eating salad like crazy\" since his last visit here in the clinic. Since his INR was on the lower end of the therapeutic range during his last visit and has dropped slightly sub-therapeutic today, we will increase current weekly warfarin regimen (14%) and recheck INR in 3 weeks following his appointment with Dr. Adelita Roberts on 9-6-2018. Patient acknowledges working in consult agreement with pharmacist as referred by his/her physician.

## 2018-08-27 RX ORDER — CARVEDILOL 6.25 MG/1
TABLET ORAL
Qty: 180 TABLET | Refills: 1 | Status: SHIPPED | OUTPATIENT
Start: 2018-08-27 | End: 2019-02-11 | Stop reason: SDUPTHER

## 2018-09-04 RX ORDER — FUROSEMIDE 20 MG/1
20 TABLET ORAL 3 TIMES DAILY
Qty: 270 TABLET | Refills: 3 | Status: SHIPPED | OUTPATIENT
Start: 2018-09-04 | End: 2018-09-20 | Stop reason: SDUPTHER

## 2018-09-04 RX ORDER — FUROSEMIDE 20 MG/1
20 TABLET ORAL 3 TIMES DAILY
Qty: 90 TABLET | Refills: 3 | Status: SHIPPED | OUTPATIENT
Start: 2018-09-04 | End: 2019-02-11 | Stop reason: SDUPTHER

## 2018-09-06 ENCOUNTER — OFFICE VISIT (OUTPATIENT)
Dept: CARDIOLOGY CLINIC | Age: 68
End: 2018-09-06
Payer: MEDICARE

## 2018-09-06 ENCOUNTER — HOSPITAL ENCOUNTER (OUTPATIENT)
Age: 68
Discharge: HOME | End: 2018-09-06
Payer: MEDICARE

## 2018-09-06 VITALS
BODY MASS INDEX: 27.75 KG/M2 | SYSTOLIC BLOOD PRESSURE: 170 MMHG | OXYGEN SATURATION: 95 % | HEART RATE: 84 BPM | WEIGHT: 222 LBS | DIASTOLIC BLOOD PRESSURE: 104 MMHG

## 2018-09-06 DIAGNOSIS — I10 ESSENTIAL HYPERTENSION: ICD-10-CM

## 2018-09-06 DIAGNOSIS — E78.00 PURE HYPERCHOLESTEROLEMIA: ICD-10-CM

## 2018-09-06 DIAGNOSIS — I50.21 CHF (CONGESTIVE HEART FAILURE), NYHA CLASS IV, ACUTE, SYSTOLIC (HCC): ICD-10-CM

## 2018-09-06 DIAGNOSIS — Z95.1 S/P CABG X 2: ICD-10-CM

## 2018-09-06 DIAGNOSIS — E55.9 VITAMIN D DEFICIENCY: ICD-10-CM

## 2018-09-06 DIAGNOSIS — I25.10 CORONARY ARTERY DISEASE DUE TO LIPID RICH PLAQUE: ICD-10-CM

## 2018-09-06 DIAGNOSIS — I25.83 CORONARY ARTERY DISEASE DUE TO LIPID RICH PLAQUE: Primary | ICD-10-CM

## 2018-09-06 DIAGNOSIS — I48.92 ATRIAL FLUTTER WITH RAPID VENTRICULAR RESPONSE (HCC): ICD-10-CM

## 2018-09-06 DIAGNOSIS — I42.9 CARDIOMYOPATHY, UNSPECIFIED TYPE (HCC): ICD-10-CM

## 2018-09-06 DIAGNOSIS — I25.10 CORONARY ARTERY DISEASE DUE TO LIPID RICH PLAQUE: Primary | ICD-10-CM

## 2018-09-06 DIAGNOSIS — I25.83 CORONARY ARTERY DISEASE DUE TO LIPID RICH PLAQUE: ICD-10-CM

## 2018-09-06 LAB
ABSOLUTE EOS #: 0.2 K/UL (ref 0–0.4)
ABSOLUTE IMMATURE GRANULOCYTE: NORMAL K/UL (ref 0–0.3)
ABSOLUTE LYMPH #: 1.8 K/UL (ref 1–4.8)
ABSOLUTE MONO #: 0.5 K/UL (ref 0–1)
ALBUMIN SERPL-MCNC: 4.2 G/DL (ref 3.5–5.2)
ALBUMIN/GLOBULIN RATIO: ABNORMAL (ref 1–2.5)
ALP BLD-CCNC: 54 U/L (ref 40–129)
ALT SERPL-CCNC: 26 U/L (ref 5–41)
ANION GAP SERPL CALCULATED.3IONS-SCNC: 13 MMOL/L (ref 9–17)
AST SERPL-CCNC: 21 U/L
BASOPHILS # BLD: 0 % (ref 0–2)
BASOPHILS ABSOLUTE: 0 K/UL (ref 0–0.2)
BILIRUB SERPL-MCNC: 0.38 MG/DL (ref 0.3–1.2)
BUN BLDV-MCNC: 24 MG/DL (ref 8–23)
BUN/CREAT BLD: 21 (ref 9–20)
CALCIUM SERPL-MCNC: 9.4 MG/DL (ref 8.6–10.4)
CHLORIDE BLD-SCNC: 104 MMOL/L (ref 98–107)
CHOLESTEROL/HDL RATIO: 6.9
CHOLESTEROL: 275 MG/DL
CO2: 23 MMOL/L (ref 20–31)
CREAT SERPL-MCNC: 1.15 MG/DL (ref 0.7–1.2)
DIFFERENTIAL TYPE: YES
EOSINOPHILS RELATIVE PERCENT: 4 % (ref 0–5)
GFR AFRICAN AMERICAN: >60 ML/MIN
GFR NON-AFRICAN AMERICAN: >60 ML/MIN
GFR SERPL CREATININE-BSD FRML MDRD: ABNORMAL ML/MIN/{1.73_M2}
GFR SERPL CREATININE-BSD FRML MDRD: ABNORMAL ML/MIN/{1.73_M2}
GLUCOSE BLD-MCNC: 100 MG/DL (ref 70–99)
HCT VFR BLD CALC: 43.4 % (ref 41–53)
HDLC SERPL-MCNC: 40 MG/DL
HEMOGLOBIN: 15 G/DL (ref 13.5–17.5)
IMMATURE GRANULOCYTES: NORMAL %
LDL CHOLESTEROL DIRECT: 167 MG/DL
LDL CHOLESTEROL: ABNORMAL MG/DL (ref 0–130)
LYMPHOCYTES # BLD: 30 % (ref 13–44)
MAGNESIUM: 2.7 MG/DL (ref 1.6–2.6)
MCH RBC QN AUTO: 32.9 PG (ref 26–34)
MCHC RBC AUTO-ENTMCNC: 34.5 G/DL (ref 31–37)
MCV RBC AUTO: 95.2 FL (ref 80–100)
MONOCYTES # BLD: 8 % (ref 5–9)
NRBC AUTOMATED: NORMAL PER 100 WBC
PATIENT FASTING?: YES
PDW BLD-RTO: 12.8 % (ref 12.1–15.2)
PLATELET # BLD: 163 K/UL (ref 140–450)
PLATELET ESTIMATE: NORMAL
PMV BLD AUTO: NORMAL FL (ref 6–12)
POTASSIUM SERPL-SCNC: 4 MMOL/L (ref 3.7–5.3)
RBC # BLD: 4.57 M/UL (ref 4.5–5.9)
RBC # BLD: NORMAL 10*6/UL
SEG NEUTROPHILS: 58 % (ref 39–75)
SEGMENTED NEUTROPHILS ABSOLUTE COUNT: 3.6 K/UL (ref 2.1–6.5)
SODIUM BLD-SCNC: 140 MMOL/L (ref 135–144)
TOTAL PROTEIN: 7 G/DL (ref 6.4–8.3)
TRIGL SERPL-MCNC: 468 MG/DL
TSH SERPL DL<=0.05 MIU/L-ACNC: 0.97 MIU/L (ref 0.3–5)
VITAMIN D 25-HYDROXY: 41.2 NG/ML (ref 30–100)
VLDLC SERPL CALC-MCNC: ABNORMAL MG/DL (ref 1–30)
WBC # BLD: 6.1 K/UL (ref 3.5–11)
WBC # BLD: NORMAL 10*3/UL

## 2018-09-06 PROCEDURE — 99213 OFFICE O/P EST LOW 20 MIN: CPT | Performed by: INTERNAL MEDICINE

## 2018-09-06 PROCEDURE — 84443 ASSAY THYROID STIM HORMONE: CPT

## 2018-09-06 PROCEDURE — 1123F ACP DISCUSS/DSCN MKR DOCD: CPT | Performed by: INTERNAL MEDICINE

## 2018-09-06 PROCEDURE — 85025 COMPLETE CBC W/AUTO DIFF WBC: CPT

## 2018-09-06 PROCEDURE — 82306 VITAMIN D 25 HYDROXY: CPT

## 2018-09-06 PROCEDURE — 3017F COLORECTAL CA SCREEN DOC REV: CPT | Performed by: INTERNAL MEDICINE

## 2018-09-06 PROCEDURE — 4040F PNEUMOC VAC/ADMIN/RCVD: CPT | Performed by: INTERNAL MEDICINE

## 2018-09-06 PROCEDURE — 80061 LIPID PANEL: CPT

## 2018-09-06 PROCEDURE — 1036F TOBACCO NON-USER: CPT | Performed by: INTERNAL MEDICINE

## 2018-09-06 PROCEDURE — 80053 COMPREHEN METABOLIC PANEL: CPT

## 2018-09-06 PROCEDURE — G8427 DOCREV CUR MEDS BY ELIG CLIN: HCPCS | Performed by: INTERNAL MEDICINE

## 2018-09-06 PROCEDURE — 1101F PT FALLS ASSESS-DOCD LE1/YR: CPT | Performed by: INTERNAL MEDICINE

## 2018-09-06 PROCEDURE — 36415 COLL VENOUS BLD VENIPUNCTURE: CPT

## 2018-09-06 PROCEDURE — 83721 ASSAY OF BLOOD LIPOPROTEIN: CPT

## 2018-09-06 PROCEDURE — 83735 ASSAY OF MAGNESIUM: CPT

## 2018-09-06 PROCEDURE — G8417 CALC BMI ABV UP PARAM F/U: HCPCS | Performed by: INTERNAL MEDICINE

## 2018-09-06 PROCEDURE — G8598 ASA/ANTIPLAT THER USED: HCPCS | Performed by: INTERNAL MEDICINE

## 2018-09-06 NOTE — LETTER
He has done well over the past 3 months since I saw him in May. He has had no chest pain or chest discomfort. He is still working out in his gym. He has pain in his left ankle and is seen at [address] [place] [address]. MEDICATIONS:  He is on aspirin 81 mg daily, Coreg 6.25 mg b.i.d., vitamin D 2000 units daily, fish oil daily, Lasix 20 mg t.i.d., Apresoline 25 mg half a tablet q. 8 hours, Imdur 30 mg daily, red yeast rice daily and Coumadin as directed. PHYSICAL EXAMINATION:  VITAL SIGNS:  His blood pressure was 170/104 with a heart rate of 84 and regular. Respiratory rate 18. O2 saturation 98%. Weight 222 pounds. GENERAL:  He is a pleasant 19-year-old gentleman. Denied pain. He was oriented to person, place and time. Answered questions appropriately. SKIN:  No unusual skin changes. HEENT:  The pupils are equally round and reactive to light and accommodation. Extraocular movements were intact. Mucous membranes were dry. NECK:  No JVD. Good carotid pulses. No carotid bruits. No lymphadenopathy or thyromegaly. CARDIOVASCULAR EXAM:  S1 and S2 were normal.  No S3 or S4. PMI was displaced to the left. Grade 3/6 systolic blowing type murmur at the apex. No diastolic murmur. LUNGS:  Quite clear to auscultation and percussion. ABDOMEN:  Soft and nontender. Good bowel sounds. EXTREMITIES:  Good femoral pulses. Good pedal pulses. No pedal edema. Skin was warm and dry. No calf tenderness. Nail beds pink. Good cap refill. LABORATORY DATA:  Sodium 140, potassium 4.0, BUN 24, creatinine 1.15, GFR greater than 60, magnesium 2.7. His cholesterol was 275 with an HDL of 40, triglycerides 468, LDL is pending. ALT was 26 and AST was 21. Glucose 100. TSH 0.97. Vitamin D was 41.2. White count 6.1, hemoglobin 15.0 with a platelet count of 259,563. EKG is pending.     I did a bedside echocardiogram that showed his EF to be approximately 35%

## 2018-09-17 RX ORDER — HYDRALAZINE HYDROCHLORIDE 25 MG/1
TABLET, FILM COATED ORAL
Qty: 135 TABLET | Refills: 2 | Status: SHIPPED | OUTPATIENT
Start: 2018-09-17 | End: 2019-02-11 | Stop reason: SDUPTHER

## 2018-09-17 NOTE — PROGRESS NOTES
Sonya Thomas M.D. [address] [place] [address], [address] [address]  [phone]          2018          Negrita Sanchez MD  [address] [place] [address], [facility]    RE:   Aashish Ventura  :  1950      Dear Dr. Ricky Chawla:    CHIEF COMPLAINT:  1. Cardiomyopathy. 2.  Coronary artery disease. HISTORY OF PRESENT ILLNESS:  I had the pleasure of seeing Mr. Ventura in our office on 2018. My full H and P is outlined on 2018. However, he does have severe coronary artery disease documented by a catheterization on 2015 that showed 90% disease in the LAD, 80% circumflex, 80% right coronary artery, EF of 50%. Also had critical right carotid disease and had a right carotid endarterectomy by Dr. Deepali Gordillo on 2015. He had open heart surgery by Dr. Christiano Arias on 06/10/2015 with a LIMA to the LAD and a vein graft to the PDA on the circumflex system. He had severe shortness of breath with a flu syndrome on 2017. He went to the emergency room on 2018 and x-ray showed a right lower lobe pneumonia and he was placed on doxycycline. He was admitted and had an echocardiogram on 2018 that showed his EF was in the 25% to 30% range. He had moderate-to-severe mitral regurgitation. I did a catheterization on 2018 that showed a patent LIMA to the LAD, circumflex had 50% disease in the ostium with an FFR of 0.98, PDA was occluded, which arose from the circumflex and his saphenous vein graft to the PDA was occluded, right coronary artery was very small and nondominant, he had 90% disease, EF was 35%. 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 He did develop atrial flutter and he converted back to sinus rhythm. He is still anticoagulated with Coumadin. He does have a history of severe hyperlipidemia and feels that statins were \"killing him. \"    He has done well over the past 3 months since cardiomyopathy with an EF that appears to be approximately 35% by bedside echocardiogram.  2.  Severe CAD. 3.  Catheterization on 05/06/2015 showing 90% LAD, 80% PDA, 80% right coronary artery, which was small, EF 50%. 4.  Right carotid endarterectomy by Dr. Ar Anderson on 06/09/2015 because of severe right carotid disease. 5.  Open heart surgery by Dr. Carmella Nguyễn on 06/10/2015 with LIMA to the LAD, a vein graft to the PDA, which arose from the circumflex. 6.  Severe cardiomyopathy, EF of 20% to 25%, with 4-chamber enlargement consistent with idiopathic cardiomyopathy secondary to flu syndrome and pneumonia, which appears to have improved slightly at 35%. 7.  Catheterization on 01/24/2018 that showed a patent LIMA to the LAD with occluded vein graft to the PDA and occluded native PDA, which arose from the circumflex with a 50% to 55% disease in the ostium of the circumflex, which was nonsignificant. He had small nondominant right coronary artery, he had diffuse disease. His EF was 35% at that time. PLAN:  1. Continue same medications. 2.  See him in 6 months in followup. 3.  We will do an echocardiogram next 05/2019 to follow his LV function. DISCUSSION:  Mr. Frantz Malik overall is doing well. He is exercising without difficulty. He does have chronic pain in his left ankle, which is being evaluated. I think his cardiac status is stable and in fact, I felt his LV function was slightly improved from what it had been previously. I made no change in medications. His pressure was very high as I saw him in clinic today, but he stated he had not taken his medications this morning. Again, he has a history of severe hyperlipidemia but does not wish to be on any statins for treatment for his cholesterol. Thank you very much for allowing me the privilege of seeing Mr. Ventura. If you have any questions on my thoughts, please do not hesitate to contact me.     Sincerely,        Olivia Boyd    D: 09/06/2018

## 2018-09-20 ENCOUNTER — OFFICE VISIT (OUTPATIENT)
Dept: FAMILY MEDICINE CLINIC | Age: 68
End: 2018-09-20
Payer: MEDICARE

## 2018-09-20 VITALS
OXYGEN SATURATION: 96 % | SYSTOLIC BLOOD PRESSURE: 110 MMHG | HEART RATE: 82 BPM | DIASTOLIC BLOOD PRESSURE: 50 MMHG | BODY MASS INDEX: 27.5 KG/M2 | WEIGHT: 220 LBS

## 2018-09-20 DIAGNOSIS — I10 ESSENTIAL HYPERTENSION: Primary | ICD-10-CM

## 2018-09-20 DIAGNOSIS — E78.00 PURE HYPERCHOLESTEROLEMIA: ICD-10-CM

## 2018-09-20 DIAGNOSIS — I50.21 ACUTE SYSTOLIC CONGESTIVE HEART FAILURE (HCC): ICD-10-CM

## 2018-09-20 PROCEDURE — 1036F TOBACCO NON-USER: CPT | Performed by: FAMILY MEDICINE

## 2018-09-20 PROCEDURE — 1101F PT FALLS ASSESS-DOCD LE1/YR: CPT | Performed by: FAMILY MEDICINE

## 2018-09-20 PROCEDURE — 3017F COLORECTAL CA SCREEN DOC REV: CPT | Performed by: FAMILY MEDICINE

## 2018-09-20 PROCEDURE — G8417 CALC BMI ABV UP PARAM F/U: HCPCS | Performed by: FAMILY MEDICINE

## 2018-09-20 PROCEDURE — 4040F PNEUMOC VAC/ADMIN/RCVD: CPT | Performed by: FAMILY MEDICINE

## 2018-09-20 PROCEDURE — G8598 ASA/ANTIPLAT THER USED: HCPCS | Performed by: FAMILY MEDICINE

## 2018-09-20 PROCEDURE — 99214 OFFICE O/P EST MOD 30 MIN: CPT | Performed by: FAMILY MEDICINE

## 2018-09-20 PROCEDURE — 1123F ACP DISCUSS/DSCN MKR DOCD: CPT | Performed by: FAMILY MEDICINE

## 2018-09-20 PROCEDURE — G8427 DOCREV CUR MEDS BY ELIG CLIN: HCPCS | Performed by: FAMILY MEDICINE

## 2018-09-20 RX ORDER — ISOSORBIDE MONONITRATE 30 MG/1
30 TABLET, EXTENDED RELEASE ORAL DAILY
Qty: 90 TABLET | Refills: 1 | Status: SHIPPED | OUTPATIENT
Start: 2018-09-20 | End: 2019-02-11 | Stop reason: SDUPTHER

## 2018-09-20 ASSESSMENT — ENCOUNTER SYMPTOMS
COUGH: 0
VOMITING: 0
NAUSEA: 0
FACIAL SWELLING: 0
BLOOD IN STOOL: 0
DIARRHEA: 0
ORTHOPNEA: 0
SHORTNESS OF BREATH: 0
CHEST TIGHTNESS: 0
BLURRED VISION: 0
CONSTIPATION: 0
EYE DISCHARGE: 0
TROUBLE SWALLOWING: 0
ABDOMINAL PAIN: 0
EYE REDNESS: 0

## 2018-09-20 ASSESSMENT — PATIENT HEALTH QUESTIONNAIRE - PHQ9
1. LITTLE INTEREST OR PLEASURE IN DOING THINGS: 0
SUM OF ALL RESPONSES TO PHQ9 QUESTIONS 1 & 2: 0
SUM OF ALL RESPONSES TO PHQ QUESTIONS 1-9: 0
SUM OF ALL RESPONSES TO PHQ QUESTIONS 1-9: 0
2. FEELING DOWN, DEPRESSED OR HOPELESS: 0

## 2018-09-20 NOTE — PROGRESS NOTES
plaque 8/10/2015    Essential hypertension 8/10/2015    Hyperlipidemia 8/10/2015      Reviewed all health maintenance requirements and ordered appropriate tests  Health Maintenance Due   Topic Date Due    Hepatitis C screen  1950    DTaP/Tdap/Td vaccine (1 - Tdap) 11/04/1969    Shingles Vaccine (1 of 2 - 2 Dose Series) 11/04/2000    Flu vaccine (1) 09/01/2018       Past Surgical History:     Past Surgical History:   Procedure Laterality Date    CARDIAC CATHETERIZATION Left 05/06/15    Dr. Cathy Shea CAD, Diffuse 90% disease in the proximal left anterior descending coronary artery. 80% disease in the distal circumflex. 80% disease in small nondominant right coronary artery. Borderline normal LV function, ejection fraction of 50%    CARDIAC CATHETERIZATION Left 01/24/2018    Dr. Arsalan Hampton @ [facility] --CAD, Small nondominant right coronary artery with 90% disease. severe 95% disease in the native left anterior descending. 50% disease at the ostium of a very large circumflex with a fractional flow reserve of 0.98. Occluded patent ductus arteriosus, which arises from the circumflex. Occluded saphenous vein graft to the patent ductus arteriosus.  CAROTID ENDARTERECTOMY Right     CORONARY ARTERY BYPASS GRAFT  6/10/15    Dr Guido Saldana x2    KNEE SURGERY Left 10/14/15    Lt knee bursa repair    NOSE SURGERY      ROTATOR CUFF REPAIR      right        Medications:       Prior to Admission medications    Medication Sig Start Date End Date Taking?  Authorizing Provider   isosorbide mononitrate (IMDUR) 30 MG extended release tablet Take 1 tablet by mouth daily 9/20/18  Yes Shira Diane MD   hydrALAZINE (APRESOLINE) 25 MG tablet TAKE ONE-HALF TABLET BY  MOUTH EVERY 8 HOURS 9/17/18  Yes MODE Suresh - CNP   furosemide (LASIX) 20 MG tablet Take 1 tablet by mouth 3 times daily 9/4/18  Yes Alea Segovia MD   carvedilol (COREG) 6.25 MG tablet TAKE 1 TABLET BY MOUTH TWO  TIMES redness. Respiratory: Negative for cough, chest tightness and shortness of breath. Cardiovascular: Negative for chest pain, palpitations, orthopnea and leg swelling. Gastrointestinal: Negative for abdominal pain, blood in stool, constipation, diarrhea, nausea and vomiting. Genitourinary: Negative for dysuria and hematuria. Musculoskeletal: Negative for joint swelling, myalgias and neck stiffness. Skin: Negative for pallor and rash. Neurological: Negative for dizziness, light-headedness and headaches. Psychiatric/Behavioral: Negative for confusion and sleep disturbance. Physical Exam:     Physical Exam   Constitutional: He is oriented to person, place, and time. He appears well-developed and well-nourished. HENT:   Head: Normocephalic and atraumatic. Eyes: Pupils are equal, round, and reactive to light. Conjunctivae are normal. Right eye exhibits no discharge. Left eye exhibits no discharge. Neck: Neck supple. No thyromegaly present. Cardiovascular: Normal rate, regular rhythm and normal heart sounds. No murmur heard. Pulmonary/Chest: Effort normal and breath sounds normal. No respiratory distress. He has no wheezes. Abdominal: Soft. Bowel sounds are normal. He exhibits no distension. There is no tenderness. Musculoskeletal: He exhibits no edema. Lymphadenopathy:     He has no cervical adenopathy. Neurological: He is alert and oriented to person, place, and time. Skin: Skin is warm and dry. No rash noted. No erythema. Psychiatric: He has a normal mood and affect. His behavior is normal.   Vitals reviewed.       Vitals:  BP (!) 110/50   Pulse 82   Wt 220 lb (99.8 kg)   SpO2 96%   BMI 27.50 kg/m²       Data:     Lab Results   Component Value Date     09/06/2018    K 4.0 09/06/2018     09/06/2018    CO2 23 09/06/2018    BUN 24 09/06/2018    CREATININE 1.15 09/06/2018    GLUCOSE 100 09/06/2018    PROT 7.0 09/06/2018    LABALBU 4.2 09/06/2018    BILITOT 0.38 09/06/2018    ALKPHOS 54 09/06/2018    AST 21 09/06/2018    ALT 26 09/06/2018     Lab Results   Component Value Date    WBC 6.1 09/06/2018    RBC 4.57 09/06/2018    HGB 15.0 09/06/2018    HCT 43.4 09/06/2018    MCV 95.2 09/06/2018    MCH 32.9 09/06/2018    MCHC 34.5 09/06/2018    RDW 12.8 09/06/2018     09/06/2018    MPV NOT REPORTED 09/06/2018     Lab Results   Component Value Date    TSH 0.97 09/06/2018     Lab Results   Component Value Date    CHOL 275 09/06/2018    HDL 40 09/06/2018          Assessment/Plan:       1. Essential hypertension  Stable on the current medications    2. Pure hypercholesterolemia  Pt refuses the statins and just had labs with Dr Ashkan Pineda. Pt will stay on all the herbal treatments like fish oil, red yeast rice and flax seed. 3. Acute systolic congestive heart failure (HCC)  Improved and just saw Dr Maira Godinez received counseling on the following healthy behaviors: nutrition and exercise  Reviewed prior labs and health maintenance  Continue current medications, diet and exercise. Discussed use, benefit, and side effects of prescribed medications. Barriers to medication compliance addressed. Patient given educational materials - see patient instructions  Was a self-tracking handout given in paper form or via [employer]? Yes    Requested Prescriptions     Signed Prescriptions Disp Refills    isosorbide mononitrate (IMDUR) 30 MG extended release tablet 90 tablet 1     Sig: Take 1 tablet by mouth daily       All patient questions answered. Patient voiced understanding. Quality Measures    Body mass index is 27.5 kg/m². Elevated. Weight control planned discussed Healthy diet and regular exercise. BP: (!) 110/50. Blood pressure is normal. Treatment plan consists of No treatment change needed. Fall Risk 9/20/2017 8/23/2016   2 or more falls in past year? no no   Fall with injury in past year? no no     The patient does not have a history of falls.  I did not - not

## 2018-09-27 ENCOUNTER — HOSPITAL ENCOUNTER (OUTPATIENT)
Dept: PHARMACY | Age: 68
Setting detail: THERAPIES SERIES
Discharge: HOME | End: 2018-09-27
Payer: MEDICARE

## 2018-09-27 VITALS — BODY MASS INDEX: 28.37 KG/M2 | WEIGHT: 227 LBS

## 2018-09-27 DIAGNOSIS — Z79.01 LONG TERM CURRENT USE OF ANTICOAGULANTS WITH INR GOAL OF 2.0-3.0: ICD-10-CM

## 2018-09-27 DIAGNOSIS — I48.92 ATRIAL FLUTTER WITH RAPID VENTRICULAR RESPONSE (HCC): ICD-10-CM

## 2018-09-27 LAB — INR BLD: 1.6

## 2018-09-27 PROCEDURE — 85610 PROTHROMBIN TIME: CPT

## 2018-09-27 PROCEDURE — 99212 OFFICE O/P EST SF 10 MIN: CPT

## 2018-09-27 RX ORDER — WARFARIN SODIUM 6 MG/1
TABLET ORAL
Qty: 90 TABLET | Refills: 3 | Status: SHIPPED
Start: 2018-09-27 | End: 2018-11-26 | Stop reason: DRUGHIGH

## 2018-09-27 NOTE — PROGRESS NOTES
Fingerstick INR drawn per clinic protocol. Patient states no visible blood in urine and no black tarry stool. Deven Win \"might have\" missed 1 dose of warfarin in the last week and might have missed 2 to 3 doses in the last month. Also of note, Jeanie Members" (who I believe he is referring to Pina Pollard)  instructed him to take 1 tablet on Fridays and Mondays and 1/2 tablet daily all other days (12.5% higher weekly warfarin dosage than we had actually instructed). No change in other maintenance medications. Deven Win states he has been eating out of his garden [employer]. \" Deven Win saw Dr. Kingsley Escobar on 9/6 and Dr. Kailee Severino on 9/20. Henri's INR is subtherapeutic today, but he requests not to change his dosage, so we will have him take warfarin 6 mg today, then will continue current weekly warfarin regimen and recheck INR in 2 week(s). Patient acknowledges working in consult agreement with pharmacist as referred by his/her physician.

## 2018-10-12 ENCOUNTER — HOSPITAL ENCOUNTER (OUTPATIENT)
Dept: PHARMACY | Age: 68
Setting detail: THERAPIES SERIES
Discharge: HOME | End: 2018-10-12
Payer: MEDICARE

## 2018-10-12 VITALS
HEART RATE: 56 BPM | WEIGHT: 221.4 LBS | SYSTOLIC BLOOD PRESSURE: 134 MMHG | DIASTOLIC BLOOD PRESSURE: 73 MMHG | BODY MASS INDEX: 27.67 KG/M2

## 2018-10-12 DIAGNOSIS — Z79.01 LONG TERM CURRENT USE OF ANTICOAGULANTS WITH INR GOAL OF 2.0-3.0: ICD-10-CM

## 2018-10-12 DIAGNOSIS — I48.92 ATRIAL FLUTTER WITH RAPID VENTRICULAR RESPONSE (HCC): ICD-10-CM

## 2018-10-12 LAB — INR BLD: 1.5

## 2018-10-12 PROCEDURE — 85610 PROTHROMBIN TIME: CPT | Performed by: FAMILY MEDICINE

## 2018-10-12 PROCEDURE — 99212 OFFICE O/P EST SF 10 MIN: CPT | Performed by: FAMILY MEDICINE

## 2018-10-12 NOTE — PATIENT INSTRUCTIONS
Please take 6mg warfarin this evening then increase your dosing to 6mg on Mondays, Wednedsdays and Fridays and 3mg all other days. Continue to monitor for signs of bleeding. Return to coumadin clinic in 2 weeks.

## 2018-10-26 ENCOUNTER — HOSPITAL ENCOUNTER (OUTPATIENT)
Dept: PHARMACY | Age: 68
Setting detail: THERAPIES SERIES
Discharge: HOME | End: 2018-10-26
Payer: MEDICARE

## 2018-10-26 VITALS
HEART RATE: 68 BPM | DIASTOLIC BLOOD PRESSURE: 79 MMHG | BODY MASS INDEX: 27.7 KG/M2 | SYSTOLIC BLOOD PRESSURE: 121 MMHG | WEIGHT: 221.6 LBS

## 2018-10-26 DIAGNOSIS — Z79.01 LONG TERM CURRENT USE OF ANTICOAGULANTS WITH INR GOAL OF 2.0-3.0: ICD-10-CM

## 2018-10-26 DIAGNOSIS — I48.92 ATRIAL FLUTTER WITH RAPID VENTRICULAR RESPONSE (HCC): ICD-10-CM

## 2018-10-26 LAB — INR BLD: 2.4

## 2018-10-26 PROCEDURE — 99211 OFF/OP EST MAY X REQ PHY/QHP: CPT

## 2018-10-26 PROCEDURE — 85610 PROTHROMBIN TIME: CPT

## 2018-11-05 RX ORDER — POTASSIUM CHLORIDE 750 MG/1
TABLET, EXTENDED RELEASE ORAL
Qty: 180 TABLET | Refills: 3 | Status: SHIPPED | OUTPATIENT
Start: 2018-11-05 | End: 2019-02-11 | Stop reason: SDUPTHER

## 2018-11-12 RX ORDER — ISOSORBIDE MONONITRATE 30 MG/1
30 TABLET, EXTENDED RELEASE ORAL DAILY
Qty: 90 TABLET | Refills: 1 | Status: SHIPPED | OUTPATIENT
Start: 2018-11-12 | End: 2019-02-11 | Stop reason: SDUPTHER

## 2018-11-26 ENCOUNTER — HOSPITAL ENCOUNTER (OUTPATIENT)
Dept: PHARMACY | Age: 68
Setting detail: THERAPIES SERIES
Discharge: HOME | End: 2018-11-26
Payer: MEDICARE

## 2018-11-26 VITALS
WEIGHT: 222.6 LBS | DIASTOLIC BLOOD PRESSURE: 67 MMHG | SYSTOLIC BLOOD PRESSURE: 138 MMHG | HEART RATE: 68 BPM | BODY MASS INDEX: 27.82 KG/M2

## 2018-11-26 DIAGNOSIS — Z79.01 LONG TERM CURRENT USE OF ANTICOAGULANTS WITH INR GOAL OF 2.0-3.0: ICD-10-CM

## 2018-11-26 DIAGNOSIS — I48.92 ATRIAL FLUTTER WITH RAPID VENTRICULAR RESPONSE (HCC): ICD-10-CM

## 2018-11-26 LAB — INR BLD: 1.9

## 2018-11-26 PROCEDURE — 85610 PROTHROMBIN TIME: CPT

## 2018-11-26 PROCEDURE — 99211 OFF/OP EST MAY X REQ PHY/QHP: CPT

## 2018-11-26 RX ORDER — WARFARIN SODIUM 6 MG/1
TABLET ORAL
Qty: 90 TABLET | Refills: 3 | Status: SHIPPED
Start: 2018-11-26 | End: 2019-02-11 | Stop reason: SDUPTHER

## 2018-12-10 ENCOUNTER — HOSPITAL ENCOUNTER (OUTPATIENT)
Dept: PHARMACY | Age: 68
Setting detail: THERAPIES SERIES
Discharge: HOME | End: 2018-12-10
Payer: MEDICARE

## 2018-12-10 VITALS
SYSTOLIC BLOOD PRESSURE: 113 MMHG | WEIGHT: 224 LBS | BODY MASS INDEX: 28 KG/M2 | DIASTOLIC BLOOD PRESSURE: 66 MMHG | HEART RATE: 65 BPM

## 2018-12-10 DIAGNOSIS — Z79.01 LONG TERM CURRENT USE OF ANTICOAGULANTS WITH INR GOAL OF 2.0-3.0: ICD-10-CM

## 2018-12-10 DIAGNOSIS — I48.92 ATRIAL FLUTTER WITH RAPID VENTRICULAR RESPONSE (HCC): ICD-10-CM

## 2018-12-10 LAB — INR BLD: 3

## 2018-12-10 PROCEDURE — 99211 OFF/OP EST MAY X REQ PHY/QHP: CPT

## 2018-12-10 PROCEDURE — 85610 PROTHROMBIN TIME: CPT

## 2019-01-14 ENCOUNTER — HOSPITAL ENCOUNTER (OUTPATIENT)
Dept: PHARMACY | Age: 69
Setting detail: THERAPIES SERIES
Discharge: HOME | End: 2019-01-14
Payer: MEDICARE

## 2019-01-14 VITALS
HEART RATE: 71 BPM | SYSTOLIC BLOOD PRESSURE: 121 MMHG | BODY MASS INDEX: 28.65 KG/M2 | WEIGHT: 229.2 LBS | DIASTOLIC BLOOD PRESSURE: 72 MMHG

## 2019-01-14 DIAGNOSIS — Z79.01 LONG TERM CURRENT USE OF ANTICOAGULANTS WITH INR GOAL OF 2.0-3.0: ICD-10-CM

## 2019-01-14 DIAGNOSIS — I48.92 ATRIAL FLUTTER WITH RAPID VENTRICULAR RESPONSE (HCC): ICD-10-CM

## 2019-01-14 LAB — INR BLD: 2.6

## 2019-01-14 PROCEDURE — 85610 PROTHROMBIN TIME: CPT

## 2019-01-14 PROCEDURE — 99211 OFF/OP EST MAY X REQ PHY/QHP: CPT

## 2019-02-11 ENCOUNTER — HOSPITAL ENCOUNTER (OUTPATIENT)
Dept: PHARMACY | Age: 69
Setting detail: THERAPIES SERIES
Discharge: HOME | End: 2019-02-11
Payer: MEDICARE

## 2019-02-11 VITALS
WEIGHT: 227.2 LBS | DIASTOLIC BLOOD PRESSURE: 81 MMHG | HEART RATE: 75 BPM | SYSTOLIC BLOOD PRESSURE: 140 MMHG | BODY MASS INDEX: 28.4 KG/M2

## 2019-02-11 DIAGNOSIS — I48.92 ATRIAL FLUTTER WITH RAPID VENTRICULAR RESPONSE (HCC): ICD-10-CM

## 2019-02-11 DIAGNOSIS — Z79.01 LONG TERM CURRENT USE OF ANTICOAGULANTS WITH INR GOAL OF 2.0-3.0: ICD-10-CM

## 2019-02-11 LAB — INR BLD: 2.2

## 2019-02-11 PROCEDURE — 99211 OFF/OP EST MAY X REQ PHY/QHP: CPT

## 2019-02-11 PROCEDURE — 85610 PROTHROMBIN TIME: CPT

## 2019-02-11 RX ORDER — WARFARIN SODIUM 6 MG/1
TABLET ORAL
Qty: 90 TABLET | Refills: 3 | Status: SHIPPED | OUTPATIENT
Start: 2019-02-11 | End: 2019-04-16 | Stop reason: DRUGHIGH

## 2019-02-11 RX ORDER — ISOSORBIDE MONONITRATE 30 MG/1
30 TABLET, EXTENDED RELEASE ORAL DAILY
Qty: 90 TABLET | Refills: 3 | Status: SHIPPED | OUTPATIENT
Start: 2019-02-11 | End: 2020-02-03

## 2019-02-11 RX ORDER — FUROSEMIDE 20 MG/1
20 TABLET ORAL 3 TIMES DAILY
Qty: 270 TABLET | Refills: 3 | Status: SHIPPED | OUTPATIENT
Start: 2019-02-11 | End: 2020-02-03

## 2019-02-11 RX ORDER — HYDRALAZINE HYDROCHLORIDE 25 MG/1
TABLET, FILM COATED ORAL
Qty: 135 TABLET | Refills: 3 | Status: SHIPPED | OUTPATIENT
Start: 2019-02-11 | End: 2020-02-03

## 2019-02-11 RX ORDER — WARFARIN SODIUM 6 MG/1
TABLET ORAL
Qty: 90 TABLET | Refills: 3 | Status: SHIPPED | OUTPATIENT
Start: 2019-02-11 | End: 2019-02-11 | Stop reason: SDUPTHER

## 2019-02-11 RX ORDER — CARVEDILOL 6.25 MG/1
6.25 TABLET ORAL 2 TIMES DAILY
Qty: 180 TABLET | Refills: 3 | Status: SHIPPED | OUTPATIENT
Start: 2019-02-11 | End: 2019-02-16 | Stop reason: SDUPTHER

## 2019-02-11 RX ORDER — HYDRALAZINE HYDROCHLORIDE 25 MG/1
TABLET, FILM COATED ORAL
Qty: 30 TABLET | Refills: 0 | Status: SHIPPED | OUTPATIENT
Start: 2019-02-11 | End: 2019-03-07 | Stop reason: SDUPTHER

## 2019-02-11 RX ORDER — POTASSIUM CHLORIDE 750 MG/1
TABLET, EXTENDED RELEASE ORAL
Qty: 180 TABLET | Refills: 3 | Status: SHIPPED | OUTPATIENT
Start: 2019-02-11 | End: 2020-02-03

## 2019-02-11 RX ORDER — CARVEDILOL 6.25 MG/1
TABLET ORAL
Qty: 45 TABLET | Refills: 0 | Status: SHIPPED | OUTPATIENT
Start: 2019-02-11 | End: 2019-02-11 | Stop reason: SDUPTHER

## 2019-02-18 RX ORDER — CARVEDILOL 6.25 MG/1
TABLET ORAL
Qty: 180 TABLET | Refills: 1 | Status: SHIPPED | OUTPATIENT
Start: 2019-02-18 | End: 2020-02-03

## 2019-03-07 ENCOUNTER — HOSPITAL ENCOUNTER (OUTPATIENT)
Age: 69
Discharge: HOME | End: 2019-03-07
Payer: MEDICARE

## 2019-03-07 ENCOUNTER — OFFICE VISIT (OUTPATIENT)
Dept: CARDIOLOGY CLINIC | Age: 69
End: 2019-03-07
Payer: MEDICARE

## 2019-03-07 VITALS
SYSTOLIC BLOOD PRESSURE: 150 MMHG | BODY MASS INDEX: 28 KG/M2 | WEIGHT: 224 LBS | HEART RATE: 70 BPM | DIASTOLIC BLOOD PRESSURE: 100 MMHG | OXYGEN SATURATION: 97 %

## 2019-03-07 DIAGNOSIS — I48.92 ATRIAL FLUTTER WITH RAPID VENTRICULAR RESPONSE (HCC): ICD-10-CM

## 2019-03-07 DIAGNOSIS — E55.9 VITAMIN D DEFICIENCY: ICD-10-CM

## 2019-03-07 DIAGNOSIS — E78.00 PURE HYPERCHOLESTEROLEMIA: ICD-10-CM

## 2019-03-07 DIAGNOSIS — I10 ESSENTIAL HYPERTENSION: ICD-10-CM

## 2019-03-07 DIAGNOSIS — R06.02 SOB (SHORTNESS OF BREATH): ICD-10-CM

## 2019-03-07 DIAGNOSIS — I25.10 CORONARY ARTERY DISEASE DUE TO LIPID RICH PLAQUE: ICD-10-CM

## 2019-03-07 DIAGNOSIS — I25.83 CORONARY ARTERY DISEASE DUE TO LIPID RICH PLAQUE: ICD-10-CM

## 2019-03-07 DIAGNOSIS — I42.9 CARDIOMYOPATHY, UNSPECIFIED TYPE (HCC): Primary | ICD-10-CM

## 2019-03-07 LAB
ABSOLUTE EOS #: 0.2 K/UL (ref 0–0.4)
ABSOLUTE IMMATURE GRANULOCYTE: NORMAL K/UL (ref 0–0.3)
ABSOLUTE LYMPH #: 1.8 K/UL (ref 1–4.8)
ABSOLUTE MONO #: 0.5 K/UL (ref 0–1)
ALBUMIN SERPL-MCNC: 4.4 G/DL (ref 3.5–5.2)
ALBUMIN/GLOBULIN RATIO: ABNORMAL (ref 1–2.5)
ALP BLD-CCNC: 63 U/L (ref 40–129)
ALT SERPL-CCNC: 40 U/L (ref 5–41)
ANION GAP SERPL CALCULATED.3IONS-SCNC: 11 MMOL/L (ref 9–17)
AST SERPL-CCNC: 24 U/L
BASOPHILS # BLD: 0 % (ref 0–2)
BASOPHILS ABSOLUTE: 0 K/UL (ref 0–0.2)
BILIRUB SERPL-MCNC: 0.34 MG/DL (ref 0.3–1.2)
BUN BLDV-MCNC: 17 MG/DL (ref 8–23)
BUN/CREAT BLD: 16 (ref 9–20)
CALCIUM SERPL-MCNC: 9 MG/DL (ref 8.6–10.4)
CHLORIDE BLD-SCNC: 106 MMOL/L (ref 98–107)
CHOLESTEROL/HDL RATIO: 6.2
CHOLESTEROL: 240 MG/DL
CO2: 26 MMOL/L (ref 20–31)
CREAT SERPL-MCNC: 1.08 MG/DL (ref 0.7–1.2)
DIFFERENTIAL TYPE: YES
EOSINOPHILS RELATIVE PERCENT: 3 % (ref 0–5)
GFR AFRICAN AMERICAN: >60 ML/MIN
GFR NON-AFRICAN AMERICAN: >60 ML/MIN
GFR SERPL CREATININE-BSD FRML MDRD: ABNORMAL ML/MIN/{1.73_M2}
GFR SERPL CREATININE-BSD FRML MDRD: ABNORMAL ML/MIN/{1.73_M2}
GLUCOSE BLD-MCNC: 109 MG/DL (ref 70–99)
HCT VFR BLD CALC: 43 % (ref 41–53)
HDLC SERPL-MCNC: 39 MG/DL
HEMOGLOBIN: 14.7 G/DL (ref 13.5–17.5)
IMMATURE GRANULOCYTES: NORMAL %
LDL CHOLESTEROL: 153 MG/DL (ref 0–130)
LYMPHOCYTES # BLD: 30 % (ref 13–44)
MAGNESIUM: 2.3 MG/DL (ref 1.6–2.6)
MCH RBC QN AUTO: 31.5 PG (ref 26–34)
MCHC RBC AUTO-ENTMCNC: 34.1 G/DL (ref 31–37)
MCV RBC AUTO: 92.3 FL (ref 80–100)
MONOCYTES # BLD: 8 % (ref 5–9)
NRBC AUTOMATED: NORMAL PER 100 WBC
PATIENT FASTING?: YES
PDW BLD-RTO: 12.2 % (ref 12.1–15.2)
PLATELET # BLD: 163 K/UL (ref 140–450)
PLATELET ESTIMATE: NORMAL
PMV BLD AUTO: NORMAL FL (ref 6–12)
POTASSIUM SERPL-SCNC: 4 MMOL/L (ref 3.7–5.3)
RBC # BLD: 4.66 M/UL (ref 4.5–5.9)
RBC # BLD: NORMAL 10*6/UL
SEG NEUTROPHILS: 59 % (ref 39–75)
SEGMENTED NEUTROPHILS ABSOLUTE COUNT: 3.7 K/UL (ref 2.1–6.5)
SODIUM BLD-SCNC: 143 MMOL/L (ref 135–144)
TOTAL PROTEIN: 7.1 G/DL (ref 6.4–8.3)
TRIGL SERPL-MCNC: 238 MG/DL
TSH SERPL DL<=0.05 MIU/L-ACNC: 0.78 MIU/L (ref 0.3–5)
VITAMIN D 25-HYDROXY: 31.8 NG/ML (ref 30–100)
VLDLC SERPL CALC-MCNC: ABNORMAL MG/DL (ref 1–30)
WBC # BLD: 6.2 K/UL (ref 3.5–11)
WBC # BLD: NORMAL 10*3/UL

## 2019-03-07 PROCEDURE — G8428 CUR MEDS NOT DOCUMENT: HCPCS | Performed by: INTERNAL MEDICINE

## 2019-03-07 PROCEDURE — 3017F COLORECTAL CA SCREEN DOC REV: CPT | Performed by: INTERNAL MEDICINE

## 2019-03-07 PROCEDURE — G8598 ASA/ANTIPLAT THER USED: HCPCS | Performed by: INTERNAL MEDICINE

## 2019-03-07 PROCEDURE — 99214 OFFICE O/P EST MOD 30 MIN: CPT | Performed by: INTERNAL MEDICINE

## 2019-03-07 PROCEDURE — 83735 ASSAY OF MAGNESIUM: CPT

## 2019-03-07 PROCEDURE — 82306 VITAMIN D 25 HYDROXY: CPT

## 2019-03-07 PROCEDURE — G8417 CALC BMI ABV UP PARAM F/U: HCPCS | Performed by: INTERNAL MEDICINE

## 2019-03-07 PROCEDURE — G8484 FLU IMMUNIZE NO ADMIN: HCPCS | Performed by: INTERNAL MEDICINE

## 2019-03-07 PROCEDURE — 4040F PNEUMOC VAC/ADMIN/RCVD: CPT | Performed by: INTERNAL MEDICINE

## 2019-03-07 PROCEDURE — 1123F ACP DISCUSS/DSCN MKR DOCD: CPT | Performed by: INTERNAL MEDICINE

## 2019-03-07 PROCEDURE — 80061 LIPID PANEL: CPT

## 2019-03-07 PROCEDURE — 85025 COMPLETE CBC W/AUTO DIFF WBC: CPT

## 2019-03-07 PROCEDURE — 80053 COMPREHEN METABOLIC PANEL: CPT

## 2019-03-07 PROCEDURE — 1101F PT FALLS ASSESS-DOCD LE1/YR: CPT | Performed by: INTERNAL MEDICINE

## 2019-03-07 PROCEDURE — 1036F TOBACCO NON-USER: CPT | Performed by: INTERNAL MEDICINE

## 2019-03-07 PROCEDURE — 36415 COLL VENOUS BLD VENIPUNCTURE: CPT

## 2019-03-07 PROCEDURE — 84443 ASSAY THYROID STIM HORMONE: CPT

## 2019-03-15 ENCOUNTER — HOSPITAL ENCOUNTER (OUTPATIENT)
Dept: PHARMACY | Age: 69
Setting detail: THERAPIES SERIES
Discharge: HOME | End: 2019-03-15
Payer: MEDICARE

## 2019-03-15 VITALS
WEIGHT: 226.4 LBS | BODY MASS INDEX: 28.3 KG/M2 | HEART RATE: 91 BPM | SYSTOLIC BLOOD PRESSURE: 120 MMHG | DIASTOLIC BLOOD PRESSURE: 83 MMHG

## 2019-03-15 DIAGNOSIS — Z79.01 LONG TERM CURRENT USE OF ANTICOAGULANTS WITH INR GOAL OF 2.0-3.0: ICD-10-CM

## 2019-03-15 DIAGNOSIS — I48.92 ATRIAL FLUTTER WITH RAPID VENTRICULAR RESPONSE (HCC): ICD-10-CM

## 2019-03-15 LAB — INR BLD: 3.5

## 2019-03-15 PROCEDURE — 99211 OFF/OP EST MAY X REQ PHY/QHP: CPT

## 2019-03-15 PROCEDURE — 85610 PROTHROMBIN TIME: CPT

## 2019-04-16 ENCOUNTER — HOSPITAL ENCOUNTER (OUTPATIENT)
Dept: PHARMACY | Age: 69
Setting detail: THERAPIES SERIES
Discharge: HOME | End: 2019-04-16
Payer: MEDICARE

## 2019-04-16 VITALS
WEIGHT: 227.8 LBS | HEART RATE: 74 BPM | BODY MASS INDEX: 28.47 KG/M2 | SYSTOLIC BLOOD PRESSURE: 101 MMHG | DIASTOLIC BLOOD PRESSURE: 70 MMHG

## 2019-04-16 DIAGNOSIS — Z79.01 LONG TERM CURRENT USE OF ANTICOAGULANTS WITH INR GOAL OF 2.0-3.0: ICD-10-CM

## 2019-04-16 DIAGNOSIS — I48.92 ATRIAL FLUTTER WITH RAPID VENTRICULAR RESPONSE (HCC): ICD-10-CM

## 2019-04-16 LAB — INR BLD: 3.7

## 2019-04-16 PROCEDURE — 99212 OFFICE O/P EST SF 10 MIN: CPT

## 2019-04-16 PROCEDURE — 85610 PROTHROMBIN TIME: CPT

## 2019-04-16 RX ORDER — WARFARIN SODIUM 6 MG/1
TABLET ORAL
Qty: 90 TABLET | Refills: 3 | Status: SHIPPED
Start: 2019-04-16 | End: 2020-02-03

## 2019-04-16 NOTE — PATIENT INSTRUCTIONS
HOLD x 1 dose, then decrease current dose of warfarin as instructed on dosing calendar provided. Continue to monitor urine and stool for signs and symptoms of bleeding. Please notify the clinic of any medication changes. Please remember to bring all medications (both prescription and OTC) to your next visit. Kindly notify the clinic if you are unable to make to your next appointment.

## 2019-04-16 NOTE — PROGRESS NOTES
Fingerstick INR drawn per clinic protocol. Patient states no visible blood in urine and no black tarry stool. Denies any missed doses of warfarin, although did take warfarin 3 mg on 3/15/19 as instructed by our clinic because of supratherapeutic INR. No change in other maintenance medications or in diet. Mi Mendoza does states he \"normally\" doesn't drink alcohol but had a \"few beers\" and \"some wine\" last night. Since Henri's INR is supratherapeutic again today, we will HOLD x 1 dose today, then will decrease current weekly warfarin regimen by 10% and recheck INR in 4 week(s). Patient acknowledges working in consult agreement with pharmacist as referred by his/her physician.

## 2019-05-21 ENCOUNTER — HOSPITAL ENCOUNTER (OUTPATIENT)
Dept: PHARMACY | Age: 69
Setting detail: THERAPIES SERIES
Discharge: HOME | End: 2019-05-21
Payer: MEDICARE

## 2019-05-21 VITALS
HEART RATE: 79 BPM | BODY MASS INDEX: 27.65 KG/M2 | DIASTOLIC BLOOD PRESSURE: 63 MMHG | SYSTOLIC BLOOD PRESSURE: 80 MMHG | WEIGHT: 221.2 LBS

## 2019-05-21 DIAGNOSIS — Z79.01 LONG TERM CURRENT USE OF ANTICOAGULANTS WITH INR GOAL OF 2.0-3.0: ICD-10-CM

## 2019-05-21 DIAGNOSIS — I48.92 ATRIAL FLUTTER WITH RAPID VENTRICULAR RESPONSE (HCC): ICD-10-CM

## 2019-05-21 LAB — INR BLD: 1.7

## 2019-05-21 PROCEDURE — 99211 OFF/OP EST MAY X REQ PHY/QHP: CPT

## 2019-05-21 PROCEDURE — 85610 PROTHROMBIN TIME: CPT

## 2019-05-21 NOTE — PROGRESS NOTES
Fingerstick INR drawn per clinic protocol. Patient states no visible blood in urine and no black tarry stool. Huey Headley states he \"missed  Half of pills last week\" including warfarin, but he is unsure of how many. No change in other maintenance medications or in diet. Huey Headley states he woke up at 3am and took yesterday's pills and then took today's pill at 6am. His BP is low in both arms today, likely due to \"double dose\" of medications. Since Henri's INR is subtherapeutic today, but we are unsure of what warfarin dosage he has taken over the last week, we will have him take warfarin 6 mg today, then will continue current weekly warfarin regimen and recheck INR in 2 week(s). Patient acknowledges working in consult agreement with pharmacist as referred by his/her physician.

## 2019-05-21 NOTE — PATIENT INSTRUCTIONS
Boost today's dosage, then continue current dose of warfarin as instructed on dosing calendar provided. Continue to monitor urine and stool for signs and symptoms of bleeding. Please notify the clinic of any medication changes. Please remember to bring all medications (both prescription and OTC) to your next visit. Kindly notify the clinic if you are unable to make to your next appointment.

## 2019-06-03 ENCOUNTER — HOSPITAL ENCOUNTER (OUTPATIENT)
Dept: PHARMACY | Age: 69
Setting detail: THERAPIES SERIES
Discharge: HOME | End: 2019-06-03
Payer: MEDICARE

## 2019-06-03 VITALS
BODY MASS INDEX: 27.3 KG/M2 | SYSTOLIC BLOOD PRESSURE: 98 MMHG | HEART RATE: 65 BPM | DIASTOLIC BLOOD PRESSURE: 71 MMHG | WEIGHT: 218.4 LBS

## 2019-06-03 DIAGNOSIS — I48.92 ATRIAL FLUTTER WITH RAPID VENTRICULAR RESPONSE (HCC): ICD-10-CM

## 2019-06-03 DIAGNOSIS — Z79.01 LONG TERM CURRENT USE OF ANTICOAGULANTS WITH INR GOAL OF 2.0-3.0: ICD-10-CM

## 2019-06-03 LAB — INR BLD: 1.4

## 2019-06-03 PROCEDURE — 99211 OFF/OP EST MAY X REQ PHY/QHP: CPT

## 2019-06-03 PROCEDURE — 85610 PROTHROMBIN TIME: CPT

## 2019-06-18 ENCOUNTER — HOSPITAL ENCOUNTER (OUTPATIENT)
Dept: PHARMACY | Age: 69
Setting detail: THERAPIES SERIES
Discharge: HOME | End: 2019-06-18
Payer: MEDICARE

## 2019-06-18 VITALS
SYSTOLIC BLOOD PRESSURE: 128 MMHG | HEART RATE: 59 BPM | DIASTOLIC BLOOD PRESSURE: 87 MMHG | WEIGHT: 221.8 LBS | BODY MASS INDEX: 27.72 KG/M2

## 2019-06-18 DIAGNOSIS — Z79.01 LONG TERM CURRENT USE OF ANTICOAGULANTS WITH INR GOAL OF 2.0-3.0: ICD-10-CM

## 2019-06-18 DIAGNOSIS — I48.92 ATRIAL FLUTTER WITH RAPID VENTRICULAR RESPONSE (HCC): ICD-10-CM

## 2019-06-18 LAB — INR BLD: 4

## 2019-06-18 PROCEDURE — 99211 OFF/OP EST MAY X REQ PHY/QHP: CPT

## 2019-06-18 PROCEDURE — 85610 PROTHROMBIN TIME: CPT

## 2019-06-18 NOTE — PATIENT INSTRUCTIONS
HOLD x 1 dose, then continue current dose of warfarin as instructed on dosing calendar provided. Continue to monitor urine and stool for signs and symptoms of bleeding. Please notify the clinic of any medication changes. Please remember to bring all medications (both prescription and OTC) to your next visit. Kindly notify the clinic if you are unable to make to your next appointment.

## 2019-07-26 ENCOUNTER — HOSPITAL ENCOUNTER (OUTPATIENT)
Dept: PHARMACY | Age: 69
Setting detail: THERAPIES SERIES
Discharge: HOME | End: 2019-07-26
Payer: MEDICARE

## 2019-07-26 VITALS
SYSTOLIC BLOOD PRESSURE: 148 MMHG | BODY MASS INDEX: 27.02 KG/M2 | HEART RATE: 69 BPM | WEIGHT: 216.2 LBS | DIASTOLIC BLOOD PRESSURE: 105 MMHG

## 2019-07-26 DIAGNOSIS — I48.92 ATRIAL FLUTTER WITH RAPID VENTRICULAR RESPONSE (HCC): ICD-10-CM

## 2019-07-26 DIAGNOSIS — Z79.01 LONG TERM CURRENT USE OF ANTICOAGULANTS WITH INR GOAL OF 2.0-3.0: ICD-10-CM

## 2019-07-26 LAB — INR BLD: 1.8

## 2019-07-26 PROCEDURE — 99211 OFF/OP EST MAY X REQ PHY/QHP: CPT

## 2019-07-26 PROCEDURE — 85610 PROTHROMBIN TIME: CPT

## 2019-08-09 ENCOUNTER — HOSPITAL ENCOUNTER (OUTPATIENT)
Dept: PHARMACY | Age: 69
Setting detail: THERAPIES SERIES
Discharge: HOME | End: 2019-08-09
Payer: MEDICARE

## 2019-08-09 VITALS
DIASTOLIC BLOOD PRESSURE: 76 MMHG | BODY MASS INDEX: 27.17 KG/M2 | SYSTOLIC BLOOD PRESSURE: 130 MMHG | WEIGHT: 217.4 LBS | HEART RATE: 73 BPM

## 2019-08-09 DIAGNOSIS — I48.92 ATRIAL FLUTTER WITH RAPID VENTRICULAR RESPONSE (HCC): ICD-10-CM

## 2019-08-09 DIAGNOSIS — Z79.01 LONG TERM CURRENT USE OF ANTICOAGULANTS WITH INR GOAL OF 2.0-3.0: ICD-10-CM

## 2019-08-09 LAB — INR BLD: 3.3

## 2019-08-09 PROCEDURE — 85610 PROTHROMBIN TIME: CPT

## 2019-08-09 PROCEDURE — 99211 OFF/OP EST MAY X REQ PHY/QHP: CPT

## 2019-09-06 ENCOUNTER — HOSPITAL ENCOUNTER (OUTPATIENT)
Dept: PHARMACY | Age: 69
Setting detail: THERAPIES SERIES
Discharge: HOME | End: 2019-09-06
Payer: MEDICARE

## 2019-09-06 ENCOUNTER — HOSPITAL ENCOUNTER (OUTPATIENT)
Dept: NON INVASIVE DIAGNOSTICS | Age: 69
Discharge: HOME | End: 2019-09-06
Payer: MEDICARE

## 2019-09-06 VITALS
BODY MASS INDEX: 27.27 KG/M2 | SYSTOLIC BLOOD PRESSURE: 143 MMHG | DIASTOLIC BLOOD PRESSURE: 95 MMHG | WEIGHT: 218.2 LBS | HEART RATE: 71 BPM

## 2019-09-06 DIAGNOSIS — I42.9 CARDIOMYOPATHY, UNSPECIFIED TYPE (HCC): ICD-10-CM

## 2019-09-06 DIAGNOSIS — R06.02 SOB (SHORTNESS OF BREATH): ICD-10-CM

## 2019-09-06 DIAGNOSIS — Z79.01 LONG TERM CURRENT USE OF ANTICOAGULANTS WITH INR GOAL OF 2.0-3.0: ICD-10-CM

## 2019-09-06 DIAGNOSIS — I48.92 ATRIAL FLUTTER WITH RAPID VENTRICULAR RESPONSE (HCC): ICD-10-CM

## 2019-09-06 LAB
INR BLD: 2.4
LV EF: 40 %
LVEF MODALITY: NORMAL

## 2019-09-06 PROCEDURE — 93306 TTE W/DOPPLER COMPLETE: CPT

## 2019-09-06 PROCEDURE — 99211 OFF/OP EST MAY X REQ PHY/QHP: CPT

## 2019-09-06 PROCEDURE — 85610 PROTHROMBIN TIME: CPT

## 2019-10-03 ENCOUNTER — HOSPITAL ENCOUNTER (OUTPATIENT)
Dept: PHARMACY | Age: 69
Setting detail: THERAPIES SERIES
Discharge: HOME | End: 2019-10-03
Payer: MEDICARE

## 2019-10-03 VITALS
SYSTOLIC BLOOD PRESSURE: 151 MMHG | DIASTOLIC BLOOD PRESSURE: 104 MMHG | BODY MASS INDEX: 27.47 KG/M2 | HEART RATE: 75 BPM | WEIGHT: 219.8 LBS

## 2019-10-03 DIAGNOSIS — Z79.01 LONG TERM CURRENT USE OF ANTICOAGULANTS WITH INR GOAL OF 2.0-3.0: ICD-10-CM

## 2019-10-03 DIAGNOSIS — I48.92 ATRIAL FLUTTER WITH RAPID VENTRICULAR RESPONSE (HCC): ICD-10-CM

## 2019-10-03 LAB — INR BLD: 2.4

## 2019-10-03 PROCEDURE — 85610 PROTHROMBIN TIME: CPT

## 2019-10-03 PROCEDURE — 99211 OFF/OP EST MAY X REQ PHY/QHP: CPT

## 2019-10-11 ENCOUNTER — OFFICE VISIT (OUTPATIENT)
Dept: PRIMARY CARE CLINIC | Age: 69
End: 2019-10-11
Payer: MEDICARE

## 2019-10-11 VITALS
WEIGHT: 217 LBS | TEMPERATURE: 97.8 F | BODY MASS INDEX: 27.12 KG/M2 | DIASTOLIC BLOOD PRESSURE: 84 MMHG | SYSTOLIC BLOOD PRESSURE: 118 MMHG | HEART RATE: 72 BPM | OXYGEN SATURATION: 96 %

## 2019-10-11 DIAGNOSIS — L08.9: Primary | ICD-10-CM

## 2019-10-11 DIAGNOSIS — S60.551A: Primary | ICD-10-CM

## 2019-10-11 PROCEDURE — 1123F ACP DISCUSS/DSCN MKR DOCD: CPT | Performed by: NURSE PRACTITIONER

## 2019-10-11 PROCEDURE — G8417 CALC BMI ABV UP PARAM F/U: HCPCS | Performed by: NURSE PRACTITIONER

## 2019-10-11 PROCEDURE — G8427 DOCREV CUR MEDS BY ELIG CLIN: HCPCS | Performed by: NURSE PRACTITIONER

## 2019-10-11 PROCEDURE — 99213 OFFICE O/P EST LOW 20 MIN: CPT | Performed by: NURSE PRACTITIONER

## 2019-10-11 PROCEDURE — 1036F TOBACCO NON-USER: CPT | Performed by: NURSE PRACTITIONER

## 2019-10-11 PROCEDURE — 3017F COLORECTAL CA SCREEN DOC REV: CPT | Performed by: NURSE PRACTITIONER

## 2019-10-11 PROCEDURE — 4040F PNEUMOC VAC/ADMIN/RCVD: CPT | Performed by: NURSE PRACTITIONER

## 2019-10-11 PROCEDURE — G8598 ASA/ANTIPLAT THER USED: HCPCS | Performed by: NURSE PRACTITIONER

## 2019-10-11 PROCEDURE — G8484 FLU IMMUNIZE NO ADMIN: HCPCS | Performed by: NURSE PRACTITIONER

## 2019-10-11 RX ORDER — CEPHALEXIN 500 MG/1
500 CAPSULE ORAL 4 TIMES DAILY
Qty: 40 CAPSULE | Refills: 0 | Status: SHIPPED | OUTPATIENT
Start: 2019-10-11 | End: 2019-10-21

## 2019-10-11 ASSESSMENT — ENCOUNTER SYMPTOMS
WHEEZING: 0
VOMITING: 0
NAUSEA: 0
DIARRHEA: 0
COUGH: 0
SHORTNESS OF BREATH: 0
SORE THROAT: 0

## 2019-10-17 ENCOUNTER — HOSPITAL ENCOUNTER (OUTPATIENT)
Age: 69
Discharge: HOME | End: 2019-10-17
Payer: MEDICARE

## 2019-10-17 ENCOUNTER — OFFICE VISIT (OUTPATIENT)
Dept: CARDIOLOGY CLINIC | Age: 69
End: 2019-10-17
Payer: MEDICARE

## 2019-10-17 VITALS
OXYGEN SATURATION: 98 % | WEIGHT: 217 LBS | DIASTOLIC BLOOD PRESSURE: 80 MMHG | BODY MASS INDEX: 27.12 KG/M2 | SYSTOLIC BLOOD PRESSURE: 130 MMHG | HEART RATE: 90 BPM

## 2019-10-17 DIAGNOSIS — I25.10 CORONARY ARTERY DISEASE DUE TO LIPID RICH PLAQUE: Primary | ICD-10-CM

## 2019-10-17 DIAGNOSIS — I25.83 CORONARY ARTERY DISEASE DUE TO LIPID RICH PLAQUE: ICD-10-CM

## 2019-10-17 DIAGNOSIS — E78.00 PURE HYPERCHOLESTEROLEMIA: ICD-10-CM

## 2019-10-17 DIAGNOSIS — I10 ESSENTIAL HYPERTENSION: ICD-10-CM

## 2019-10-17 DIAGNOSIS — I25.10 CORONARY ARTERY DISEASE DUE TO LIPID RICH PLAQUE: ICD-10-CM

## 2019-10-17 DIAGNOSIS — R06.02 SOB (SHORTNESS OF BREATH): ICD-10-CM

## 2019-10-17 DIAGNOSIS — I48.92 ATRIAL FLUTTER WITH RAPID VENTRICULAR RESPONSE (HCC): ICD-10-CM

## 2019-10-17 DIAGNOSIS — E55.9 VITAMIN D DEFICIENCY: ICD-10-CM

## 2019-10-17 DIAGNOSIS — I42.9 CARDIOMYOPATHY, UNSPECIFIED TYPE (HCC): ICD-10-CM

## 2019-10-17 DIAGNOSIS — I25.83 CORONARY ARTERY DISEASE DUE TO LIPID RICH PLAQUE: Primary | ICD-10-CM

## 2019-10-17 LAB
ABSOLUTE EOS #: 0.2 K/UL (ref 0–0.4)
ABSOLUTE IMMATURE GRANULOCYTE: ABNORMAL K/UL (ref 0–0.3)
ABSOLUTE LYMPH #: 1.2 K/UL (ref 1–4.8)
ABSOLUTE MONO #: 0.8 K/UL (ref 0–1)
ALBUMIN SERPL-MCNC: 4.5 G/DL (ref 3.5–5.2)
ALBUMIN/GLOBULIN RATIO: ABNORMAL (ref 1–2.5)
ALP BLD-CCNC: 74 U/L (ref 40–129)
ALT SERPL-CCNC: 27 U/L (ref 5–41)
ANION GAP SERPL CALCULATED.3IONS-SCNC: 10 MMOL/L (ref 9–17)
AST SERPL-CCNC: 20 U/L
BASOPHILS # BLD: 0 % (ref 0–2)
BASOPHILS ABSOLUTE: 0 K/UL (ref 0–0.2)
BILIRUB SERPL-MCNC: 0.72 MG/DL (ref 0.3–1.2)
BUN BLDV-MCNC: 21 MG/DL (ref 8–23)
BUN/CREAT BLD: 20 (ref 9–20)
CALCIUM SERPL-MCNC: 9.9 MG/DL (ref 8.6–10.4)
CHLORIDE BLD-SCNC: 107 MMOL/L (ref 98–107)
CHOLESTEROL/HDL RATIO: 6.1
CHOLESTEROL: 245 MG/DL
CO2: 26 MMOL/L (ref 20–31)
CREAT SERPL-MCNC: 1.07 MG/DL (ref 0.7–1.2)
DIFFERENTIAL TYPE: YES
EOSINOPHILS RELATIVE PERCENT: 2 % (ref 0–5)
GFR AFRICAN AMERICAN: >60 ML/MIN
GFR NON-AFRICAN AMERICAN: >60 ML/MIN
GFR SERPL CREATININE-BSD FRML MDRD: ABNORMAL ML/MIN/{1.73_M2}
GFR SERPL CREATININE-BSD FRML MDRD: ABNORMAL ML/MIN/{1.73_M2}
GLUCOSE BLD-MCNC: 109 MG/DL (ref 70–99)
HCT VFR BLD CALC: 45.1 % (ref 41–53)
HDLC SERPL-MCNC: 40 MG/DL
HEMOGLOBIN: 15.4 G/DL (ref 13.5–17.5)
IMMATURE GRANULOCYTES: ABNORMAL %
LDL CHOLESTEROL: 137 MG/DL (ref 0–130)
LYMPHOCYTES # BLD: 12 % (ref 13–44)
MAGNESIUM: 2.5 MG/DL (ref 1.6–2.6)
MCH RBC QN AUTO: 31.8 PG (ref 26–34)
MCHC RBC AUTO-ENTMCNC: 34.1 G/DL (ref 31–37)
MCV RBC AUTO: 93.2 FL (ref 80–100)
MONOCYTES # BLD: 8 % (ref 5–9)
NRBC AUTOMATED: ABNORMAL PER 100 WBC
PATIENT FASTING?: YES
PDW BLD-RTO: 12.4 % (ref 12.1–15.2)
PLATELET # BLD: 179 K/UL (ref 140–450)
PLATELET ESTIMATE: ABNORMAL
PMV BLD AUTO: ABNORMAL FL (ref 6–12)
POTASSIUM SERPL-SCNC: 4.2 MMOL/L (ref 3.7–5.3)
RBC # BLD: 4.84 M/UL (ref 4.5–5.9)
RBC # BLD: ABNORMAL 10*6/UL
SEG NEUTROPHILS: 78 % (ref 39–75)
SEGMENTED NEUTROPHILS ABSOLUTE COUNT: 7.6 K/UL (ref 2.1–6.5)
SODIUM BLD-SCNC: 143 MMOL/L (ref 135–144)
TOTAL PROTEIN: 7.9 G/DL (ref 6.4–8.3)
TRIGL SERPL-MCNC: 338 MG/DL
TSH SERPL DL<=0.05 MIU/L-ACNC: 0.81 MIU/L (ref 0.3–5)
VITAMIN D 25-HYDROXY: 42.1 NG/ML (ref 30–100)
VLDLC SERPL CALC-MCNC: ABNORMAL MG/DL (ref 1–30)
WBC # BLD: 9.7 K/UL (ref 3.5–11)
WBC # BLD: ABNORMAL 10*3/UL

## 2019-10-17 PROCEDURE — 1123F ACP DISCUSS/DSCN MKR DOCD: CPT | Performed by: INTERNAL MEDICINE

## 2019-10-17 PROCEDURE — 99214 OFFICE O/P EST MOD 30 MIN: CPT | Performed by: INTERNAL MEDICINE

## 2019-10-17 PROCEDURE — 36415 COLL VENOUS BLD VENIPUNCTURE: CPT

## 2019-10-17 PROCEDURE — 85025 COMPLETE CBC W/AUTO DIFF WBC: CPT

## 2019-10-17 PROCEDURE — 80061 LIPID PANEL: CPT

## 2019-10-17 PROCEDURE — 80053 COMPREHEN METABOLIC PANEL: CPT

## 2019-10-17 PROCEDURE — G8417 CALC BMI ABV UP PARAM F/U: HCPCS | Performed by: INTERNAL MEDICINE

## 2019-10-17 PROCEDURE — 84443 ASSAY THYROID STIM HORMONE: CPT

## 2019-10-17 PROCEDURE — G8484 FLU IMMUNIZE NO ADMIN: HCPCS | Performed by: INTERNAL MEDICINE

## 2019-10-17 PROCEDURE — 83735 ASSAY OF MAGNESIUM: CPT

## 2019-10-17 PROCEDURE — 3017F COLORECTAL CA SCREEN DOC REV: CPT | Performed by: INTERNAL MEDICINE

## 2019-10-17 PROCEDURE — G8427 DOCREV CUR MEDS BY ELIG CLIN: HCPCS | Performed by: INTERNAL MEDICINE

## 2019-10-17 PROCEDURE — G8598 ASA/ANTIPLAT THER USED: HCPCS | Performed by: INTERNAL MEDICINE

## 2019-10-17 PROCEDURE — 4040F PNEUMOC VAC/ADMIN/RCVD: CPT | Performed by: INTERNAL MEDICINE

## 2019-10-17 PROCEDURE — 1036F TOBACCO NON-USER: CPT | Performed by: INTERNAL MEDICINE

## 2019-10-17 PROCEDURE — 93005 ELECTROCARDIOGRAM TRACING: CPT

## 2019-10-17 PROCEDURE — 82306 VITAMIN D 25 HYDROXY: CPT

## 2019-10-18 LAB
EKG ATRIAL RATE: 86 BPM
EKG P AXIS: 79 DEGREES
EKG P-R INTERVAL: 154 MS
EKG Q-T INTERVAL: 410 MS
EKG QRS DURATION: 126 MS
EKG QTC CALCULATION (BAZETT): 490 MS
EKG R AXIS: 57 DEGREES
EKG T AXIS: 76 DEGREES
EKG VENTRICULAR RATE: 86 BPM

## 2019-10-18 PROCEDURE — 93010 ELECTROCARDIOGRAM REPORT: CPT | Performed by: INTERNAL MEDICINE

## 2019-11-08 ENCOUNTER — HOSPITAL ENCOUNTER (OUTPATIENT)
Dept: PHARMACY | Age: 69
Setting detail: THERAPIES SERIES
Discharge: HOME | End: 2019-11-08
Payer: MEDICARE

## 2019-11-08 VITALS
WEIGHT: 216 LBS | HEART RATE: 64 BPM | SYSTOLIC BLOOD PRESSURE: 98 MMHG | BODY MASS INDEX: 27 KG/M2 | DIASTOLIC BLOOD PRESSURE: 55 MMHG

## 2019-11-08 DIAGNOSIS — I48.92 ATRIAL FLUTTER WITH RAPID VENTRICULAR RESPONSE (HCC): ICD-10-CM

## 2019-11-08 DIAGNOSIS — Z79.01 LONG TERM CURRENT USE OF ANTICOAGULANTS WITH INR GOAL OF 2.0-3.0: ICD-10-CM

## 2019-11-08 LAB — INR BLD: 2

## 2019-11-08 PROCEDURE — 99211 OFF/OP EST MAY X REQ PHY/QHP: CPT

## 2019-11-08 PROCEDURE — 85610 PROTHROMBIN TIME: CPT

## 2019-12-02 ENCOUNTER — HOSPITAL ENCOUNTER (OUTPATIENT)
Dept: PHARMACY | Age: 69
Setting detail: THERAPIES SERIES
Discharge: HOME | End: 2019-12-02
Payer: MEDICARE

## 2019-12-02 VITALS
HEART RATE: 72 BPM | WEIGHT: 212.6 LBS | BODY MASS INDEX: 26.57 KG/M2 | DIASTOLIC BLOOD PRESSURE: 77 MMHG | SYSTOLIC BLOOD PRESSURE: 141 MMHG

## 2019-12-02 DIAGNOSIS — Z79.01 LONG TERM CURRENT USE OF ANTICOAGULANTS WITH INR GOAL OF 2.0-3.0: ICD-10-CM

## 2019-12-02 DIAGNOSIS — I48.92 ATRIAL FLUTTER WITH RAPID VENTRICULAR RESPONSE (HCC): ICD-10-CM

## 2019-12-02 LAB — INR BLD: 2.2

## 2019-12-02 PROCEDURE — 85610 PROTHROMBIN TIME: CPT

## 2019-12-02 PROCEDURE — 99211 OFF/OP EST MAY X REQ PHY/QHP: CPT

## 2020-01-15 ENCOUNTER — TELEPHONE (OUTPATIENT)
Dept: PHARMACY | Age: 70
End: 2020-01-15

## 2020-01-23 ENCOUNTER — HOSPITAL ENCOUNTER (OUTPATIENT)
Dept: PHARMACY | Age: 70
Setting detail: THERAPIES SERIES
Discharge: HOME | End: 2020-01-23
Payer: MEDICARE

## 2020-01-23 VITALS — SYSTOLIC BLOOD PRESSURE: 123 MMHG | HEART RATE: 81 BPM | DIASTOLIC BLOOD PRESSURE: 82 MMHG

## 2020-01-23 LAB — INR BLD: 1.6

## 2020-01-23 PROCEDURE — 99211 OFF/OP EST MAY X REQ PHY/QHP: CPT

## 2020-01-23 PROCEDURE — 85610 PROTHROMBIN TIME: CPT

## 2020-01-23 NOTE — PROGRESS NOTES
Fingerstick INR drawn per clinic protocol. Patient states no visible blood in urine and no black tarry stool. Henri missed doses of warfarin, but is not sure exactly how many. He states he has been so \"busy\" since his mom \"fell and broke her hip,\" that he is only taking his medications including his warfarin \"1/2 the time. \" No change in other maintenance medications or in diet. Henri's INR is subtherapeutic today, but he admits to missing warfarin doses and would prefer we do not change, so we will continue current weekly warfarin regimen and recheck INR in 4 week(s). Patient acknowledges working in consult agreement with pharmacist as referred by his/her physician.

## 2020-02-03 RX ORDER — WARFARIN SODIUM 6 MG/1
TABLET ORAL
Qty: 90 TABLET | Refills: 3 | Status: SHIPPED | OUTPATIENT
Start: 2020-02-03 | End: 2020-02-27 | Stop reason: DRUGHIGH

## 2020-02-03 RX ORDER — CARVEDILOL 6.25 MG/1
TABLET ORAL
Qty: 180 TABLET | Refills: 1 | Status: SHIPPED | OUTPATIENT
Start: 2020-02-03 | End: 2020-06-15

## 2020-02-03 RX ORDER — POTASSIUM CHLORIDE 750 MG/1
TABLET, EXTENDED RELEASE ORAL
Qty: 180 TABLET | Refills: 3 | Status: SHIPPED | OUTPATIENT
Start: 2020-02-03 | End: 2020-11-16

## 2020-02-03 RX ORDER — HYDRALAZINE HYDROCHLORIDE 25 MG/1
TABLET, FILM COATED ORAL
Qty: 135 TABLET | Refills: 3 | Status: SHIPPED | OUTPATIENT
Start: 2020-02-03 | End: 2020-11-16

## 2020-02-03 RX ORDER — FUROSEMIDE 20 MG/1
TABLET ORAL
Qty: 270 TABLET | Refills: 3 | Status: SHIPPED | OUTPATIENT
Start: 2020-02-03 | End: 2020-11-16

## 2020-02-03 RX ORDER — ISOSORBIDE MONONITRATE 30 MG/1
TABLET, EXTENDED RELEASE ORAL
Qty: 90 TABLET | Refills: 3 | Status: SHIPPED | OUTPATIENT
Start: 2020-02-03 | End: 2020-11-16

## 2020-02-27 ENCOUNTER — HOSPITAL ENCOUNTER (OUTPATIENT)
Dept: PHARMACY | Age: 70
Setting detail: THERAPIES SERIES
Discharge: HOME | End: 2020-02-27
Payer: MEDICARE

## 2020-02-27 VITALS
WEIGHT: 216.8 LBS | SYSTOLIC BLOOD PRESSURE: 86 MMHG | BODY MASS INDEX: 27.1 KG/M2 | HEART RATE: 83 BPM | DIASTOLIC BLOOD PRESSURE: 48 MMHG

## 2020-02-27 LAB — INR BLD: 3.1

## 2020-02-27 PROCEDURE — 85610 PROTHROMBIN TIME: CPT

## 2020-02-27 PROCEDURE — 99211 OFF/OP EST MAY X REQ PHY/QHP: CPT

## 2020-02-27 RX ORDER — WARFARIN SODIUM 6 MG/1
TABLET ORAL
Qty: 90 TABLET | Refills: 3 | Status: SHIPPED
Start: 2020-02-27 | End: 2020-11-16

## 2020-03-24 ENCOUNTER — TELEPHONE (OUTPATIENT)
Dept: PHARMACY | Age: 70
End: 2020-03-24

## 2020-03-26 ENCOUNTER — TELEPHONE (OUTPATIENT)
Dept: PHARMACY | Age: 70
End: 2020-03-26

## 2020-04-22 ENCOUNTER — TELEPHONE (OUTPATIENT)
Dept: PHARMACY | Age: 70
End: 2020-04-22

## 2020-05-19 ENCOUNTER — TELEPHONE (OUTPATIENT)
Dept: PHARMACY | Age: 70
End: 2020-05-19

## 2020-05-19 NOTE — LETTER
5/20/2020    Dear Josef Cook  ,       We are sorry that you missed your appointment with Medication Management ([facility]) on 3/26/2020 and 4/23/2020. Your last visit was on 2/27/2020, INR=3.1. Your health and follow-up medical care are important to us. Please call our office as soon as possible so that we may reschedule your appointment. If you have already rescheduled your appointment, please disregard this letter. If we do not hear from you within 7 days we may have to discharge you from our clinic.      Sincerely,          Jarek Mcmullen, PharmD  Alysia Mcknight, PharmD  [facility] SPECIALTY [facility], Medication Management  [address] [address]  [phone] (phone)  [phone] (fax)

## 2020-06-15 RX ORDER — CARVEDILOL 6.25 MG/1
TABLET ORAL
Qty: 180 TABLET | Refills: 1 | Status: SHIPPED | OUTPATIENT
Start: 2020-06-15 | End: 2020-11-09

## 2020-06-24 PROBLEM — Z79.01 LONG TERM CURRENT USE OF ANTICOAGULANTS WITH INR GOAL OF 2.0-3.0: Status: RESOLVED | Noted: 2018-04-03 | Resolved: 2020-06-24

## 2020-06-24 PROBLEM — I48.92 ATRIAL FLUTTER WITH RAPID VENTRICULAR RESPONSE (HCC): Status: RESOLVED | Noted: 2018-04-03 | Resolved: 2020-06-24

## 2020-07-20 ENCOUNTER — TELEPHONE (OUTPATIENT)
Dept: PHARMACY | Age: 70
End: 2020-07-20

## 2020-07-20 NOTE — TELEPHONE ENCOUNTER
COVID-19 phone screening     Call placed to screen patient prior to upcoming Medication Management visit for Anticoagulation. Does patient have fever and/or lower respiratory symptoms (SOB, difficulty breathing, cough)? [] Yes    [x] No    If yes, complete Travel Screening and ask the following:   [] [Fever OR s/sxs of lower respiratory illness]  AND  [close contact with lab-confirmed COVID-19 patient within 14 days of symptom onset   [] [Fever AND s/sxs of lower respiratory illness requiring hospitalization] AND [history of travel to [place], [place], [place], [facility], [place] within 14 days of sx onset]  [] [Fever with severe acute lower respiratory illness (I.e. PNA, ARDS) requiring hospitalization and without alternative explanatory diagnosis (I.e. Influenza)] AND [no source of exposure identified]    [x] None of the following; patient confirmed for regularly scheduled INR check and instructed to call the clinic immediately if any symptoms develop prior to upcoming appointment.

## 2020-07-21 ENCOUNTER — HOSPITAL ENCOUNTER (OUTPATIENT)
Dept: PHARMACY | Age: 70
Setting detail: THERAPIES SERIES
Discharge: HOME | End: 2020-07-21
Payer: MEDICARE

## 2020-07-21 VITALS — TEMPERATURE: 98.8 F

## 2020-07-21 LAB
INR BLD: 2.3
INTERNATIONAL NORMALIZATION RATIO, POC: 2.3

## 2020-07-21 PROCEDURE — 99211 OFF/OP EST MAY X REQ PHY/QHP: CPT

## 2020-07-21 PROCEDURE — 85610 PROTHROMBIN TIME: CPT

## 2020-07-21 NOTE — PROGRESS NOTES
Fingerstick INR drawn per clinic protocol. Patient states no visible blood in urine and no black tarry stool. Denies any missed doses of warfarin. No change in other maintenance medications or in diet. Will continue current warfarin regimen and recheck INR in 6 weeks. Patient acknowledges working in consult agreement with pharmacist as referred by his/her physician.       CLINICAL PHARMACY CONSULT: MED RECONCILIATION/REVIEW ADDENDUM    For Pharmacy Admin Tracking Only    PHSO: No  Total # of Interventions Recommended: 0    - Maintenance Safety Lab Monitoring #: 1    Total Interventions Accepted: 0  Time Spent (min): 30    Keshav Cano, SandraD

## 2020-08-31 ENCOUNTER — TELEPHONE (OUTPATIENT)
Dept: PHARMACY | Age: 70
End: 2020-08-31

## 2020-08-31 NOTE — TELEPHONE ENCOUNTER
COVID-19 phone screening     Call placed to screen patient prior to upcoming Medication Management visit for Anticoagulation. Does patient have fever and/or lower respiratory symptoms (SOB, difficulty breathing, cough)? [] Yes    [] No    If yes, complete Travel Screening and ask the following:   [] [Fever OR s/sxs of lower respiratory illness]  AND  [close contact with lab-confirmed COVID-19 patient within 14 days of symptom onset   [] [Fever AND s/sxs of lower respiratory illness requiring hospitalization] AND [history of travel to [place], [place], [place], [facility], [place] within 14 days of sx onset]  [] [Fever with severe acute lower respiratory illness (I.e. PNA, ARDS) requiring hospitalization and without alternative explanatory diagnosis (I.e. Influenza)] AND [no source of exposure identified]    [x] None of the following; patient confirmed for regularly scheduled INR check and instructed to call the clinic immediately if any symptoms develop prior to upcoming appointment.

## 2020-10-15 ENCOUNTER — OFFICE VISIT (OUTPATIENT)
Dept: CARDIOLOGY CLINIC | Age: 70
End: 2020-10-15
Payer: MEDICARE

## 2020-10-15 ENCOUNTER — HOSPITAL ENCOUNTER (OUTPATIENT)
Age: 70
Discharge: HOME | End: 2020-10-15
Payer: MEDICARE

## 2020-10-15 ENCOUNTER — TELEPHONE (OUTPATIENT)
Dept: CARDIOLOGY CLINIC | Age: 70
End: 2020-10-15

## 2020-10-15 ENCOUNTER — HOSPITAL ENCOUNTER (OUTPATIENT)
Age: 70
Discharge: HOME | End: 2020-10-17
Payer: MEDICARE

## 2020-10-15 ENCOUNTER — HOSPITAL ENCOUNTER (OUTPATIENT)
Dept: GENERAL RADIOLOGY | Age: 70
Discharge: HOME | End: 2020-10-17
Payer: MEDICARE

## 2020-10-15 VITALS
HEART RATE: 84 BPM | SYSTOLIC BLOOD PRESSURE: 120 MMHG | BODY MASS INDEX: 26.5 KG/M2 | WEIGHT: 212 LBS | DIASTOLIC BLOOD PRESSURE: 88 MMHG | OXYGEN SATURATION: 96 %

## 2020-10-15 LAB
ABSOLUTE EOS #: 0.1 K/UL (ref 0–0.4)
ABSOLUTE IMMATURE GRANULOCYTE: NORMAL K/UL (ref 0–0.3)
ABSOLUTE LYMPH #: 1.6 K/UL (ref 1–4.8)
ABSOLUTE MONO #: 0.5 K/UL (ref 0–1)
ALBUMIN SERPL-MCNC: 4.6 G/DL (ref 3.5–5.2)
ALBUMIN/GLOBULIN RATIO: ABNORMAL (ref 1–2.5)
ALP BLD-CCNC: 61 U/L (ref 40–129)
ALT SERPL-CCNC: 16 U/L (ref 5–41)
ANION GAP SERPL CALCULATED.3IONS-SCNC: 10 MMOL/L (ref 9–17)
AST SERPL-CCNC: 17 U/L
BASOPHILS # BLD: 0 % (ref 0–2)
BASOPHILS ABSOLUTE: 0 K/UL (ref 0–0.2)
BILIRUB SERPL-MCNC: 0.67 MG/DL (ref 0.3–1.2)
BUN BLDV-MCNC: 23 MG/DL (ref 8–23)
BUN/CREAT BLD: 18 (ref 9–20)
CALCIUM SERPL-MCNC: 9.8 MG/DL (ref 8.6–10.4)
CHLORIDE BLD-SCNC: 101 MMOL/L (ref 98–107)
CHOLESTEROL/HDL RATIO: 6
CHOLESTEROL: 247 MG/DL
CO2: 27 MMOL/L (ref 20–31)
CREAT SERPL-MCNC: 1.28 MG/DL (ref 0.7–1.2)
DIFFERENTIAL TYPE: YES
EOSINOPHILS RELATIVE PERCENT: 2 % (ref 0–5)
GFR AFRICAN AMERICAN: >60 ML/MIN
GFR NON-AFRICAN AMERICAN: 56 ML/MIN
GFR SERPL CREATININE-BSD FRML MDRD: ABNORMAL ML/MIN/{1.73_M2}
GFR SERPL CREATININE-BSD FRML MDRD: ABNORMAL ML/MIN/{1.73_M2}
GLUCOSE BLD-MCNC: 100 MG/DL (ref 70–99)
HCT VFR BLD CALC: 45.4 % (ref 41–53)
HDLC SERPL-MCNC: 41 MG/DL
HEMOGLOBIN: 15.5 G/DL (ref 13.5–17.5)
IMMATURE GRANULOCYTES: NORMAL %
LDL CHOLESTEROL: 165 MG/DL (ref 0–130)
LYMPHOCYTES # BLD: 26 % (ref 13–44)
MAGNESIUM: 2.1 MG/DL (ref 1.6–2.6)
MCH RBC QN AUTO: 31.6 PG (ref 26–34)
MCHC RBC AUTO-ENTMCNC: 34.2 G/DL (ref 31–37)
MCV RBC AUTO: 92.3 FL (ref 80–100)
MONOCYTES # BLD: 9 % (ref 5–9)
NRBC AUTOMATED: NORMAL PER 100 WBC
PATIENT FASTING?: YES
PDW BLD-RTO: 12.8 % (ref 12.1–15.2)
PLATELET # BLD: 179 K/UL (ref 140–450)
PLATELET ESTIMATE: NORMAL
PMV BLD AUTO: NORMAL FL (ref 6–12)
POTASSIUM SERPL-SCNC: 4.2 MMOL/L (ref 3.7–5.3)
RBC # BLD: 4.92 M/UL (ref 4.5–5.9)
RBC # BLD: NORMAL 10*6/UL
SEG NEUTROPHILS: 63 % (ref 39–75)
SEGMENTED NEUTROPHILS ABSOLUTE COUNT: 4 K/UL (ref 2.1–6.5)
SODIUM BLD-SCNC: 138 MMOL/L (ref 135–144)
TOTAL PROTEIN: 7.3 G/DL (ref 6.4–8.3)
TRIGL SERPL-MCNC: 203 MG/DL
TSH SERPL DL<=0.05 MIU/L-ACNC: 1.21 MIU/L (ref 0.3–5)
VITAMIN D 25-HYDROXY: 45.1 NG/ML (ref 30–100)
VLDLC SERPL CALC-MCNC: ABNORMAL MG/DL (ref 1–30)
WBC # BLD: 6.3 K/UL (ref 3.5–11)
WBC # BLD: NORMAL 10*3/UL

## 2020-10-15 PROCEDURE — 36415 COLL VENOUS BLD VENIPUNCTURE: CPT

## 2020-10-15 PROCEDURE — 83735 ASSAY OF MAGNESIUM: CPT

## 2020-10-15 PROCEDURE — 80053 COMPREHEN METABOLIC PANEL: CPT

## 2020-10-15 PROCEDURE — G8484 FLU IMMUNIZE NO ADMIN: HCPCS | Performed by: INTERNAL MEDICINE

## 2020-10-15 PROCEDURE — 82306 VITAMIN D 25 HYDROXY: CPT

## 2020-10-15 PROCEDURE — 80061 LIPID PANEL: CPT

## 2020-10-15 PROCEDURE — 85025 COMPLETE CBC W/AUTO DIFF WBC: CPT

## 2020-10-15 PROCEDURE — 4040F PNEUMOC VAC/ADMIN/RCVD: CPT | Performed by: INTERNAL MEDICINE

## 2020-10-15 PROCEDURE — 99214 OFFICE O/P EST MOD 30 MIN: CPT | Performed by: INTERNAL MEDICINE

## 2020-10-15 PROCEDURE — 3017F COLORECTAL CA SCREEN DOC REV: CPT | Performed by: INTERNAL MEDICINE

## 2020-10-15 PROCEDURE — G8427 DOCREV CUR MEDS BY ELIG CLIN: HCPCS | Performed by: INTERNAL MEDICINE

## 2020-10-15 PROCEDURE — 1036F TOBACCO NON-USER: CPT | Performed by: INTERNAL MEDICINE

## 2020-10-15 PROCEDURE — G8417 CALC BMI ABV UP PARAM F/U: HCPCS | Performed by: INTERNAL MEDICINE

## 2020-10-15 PROCEDURE — 1123F ACP DISCUSS/DSCN MKR DOCD: CPT | Performed by: INTERNAL MEDICINE

## 2020-10-15 PROCEDURE — 84443 ASSAY THYROID STIM HORMONE: CPT

## 2020-10-15 PROCEDURE — 71046 X-RAY EXAM CHEST 2 VIEWS: CPT

## 2020-10-15 NOTE — PROGRESS NOTES
Ov DR Charlette Landin 1 year follow up  Lost weight   No chest pain or sob. Would not do echo due  To copay. Does side jobs. But with COVID been  Cut back. Son Ana Tuttle thrown out of   House into drugs. Mother passed away . Bedside echo done. Will see in 1 year.

## 2020-10-15 NOTE — LETTER
Yu Brandon M.D. [address] [place] [address]  [phone]        October 15, 2020        Idalia Adkins MD  [address] [address]    RE:   Aleksandr Yan  :  1950    Dear Dr. Bernett Habermann:    CHIEF COMPLAINT:  1. Coronary artery disease. 2.  Idiopathic cardiomyopathy, which has improved from 20% to close to 45% on a bedside echocardiogram today. HISTORY OF PRESENT ILLNESS:  I saw Mr. Ventura in the office on 10/15/2020. He is a pleasant 77-year-old gentleman who I met on 2015 for cardiac clearance for left knee replacement. He was hypertensive, had never been on medication. Had nonspecific ST changes with very strong family history of coronary artery disease. He had a markedly abnormal stress test, which resulted in a catheterization on 2015, at [facility] that showed 90% disease in the LAD in the proximal portion, 80% disease in the circumflex, 80% disease in the small right coronary artery, EF of 50%. He also had critical right carotid artery disease. He had a right carotid endarterectomy by Dr. Kurt Strong on 2015. The following day on 06/10/2015, he had open heart surgery by Dr. Brie Wyatt with a LIMA to the LAD and a vein graft to the PDA, which came off the circumflex system. He had subsequent left meniscus surgery by Dr. Jose C Chery on 10/15/2015, from which he made a good recovery. He had flu syndrome in 2017 and went to the emergency room on 2018. Chest x-ray showed right lower lobe pneumonia. A CT scan of his chest was negative for PE. He was admitted for CHF and pneumonia. An echocardiogram on 2018 showed an EF of 25% with moderate-to-severe mitral regurgitation.   Repeating a catheterization on 2018 showed a patent LIMA to the LAD, circumflex had 50% disease in the ostium with an FFR of 0.98, the PDA was occluded, which arose from the circumflex, the vein graft to the PDA was also occluded, the right coronary artery was small and nondominant, EF was 30% to 35%. I placed him in cardiac rehab and placed him on full medical therapy. He did develop atrial fibrillation on 01/05/2018, with a heart rate of 120 to 130 and was placed on Xarelto. He converted to sinus rhythm and is now on Coumadin. He has done well over the past year. He is not treating his hyperlipidemia as statins \"almost killed him. \"  He is followed at [facility] for his carotid disease and there has been no restenosis. He has lost approximately 15 pounds of weight. He has slightly more shortness of breath but has not been exercising as much as usual.  He denies any chest pain. He has had no PND, orthopnea, or pedal edema. It has been a difficult time with the COVID crisis. He has not had any blood work as of yet and did not do an echocardiogram, but we did do a bedside echocardiogram while he was here, which showed his EF at the 45% range. He has no complaints from a cardiac standpoint and he overall feels good and is doing well. CARDIAC RISK FACTORS:  Known CAD:  Positive. Peripheral Vascular Disease:  Positive. Hyperlipidemia:  Positive. Other Family Members:  Positive. Diabetes:  Negative. Smoking:  Negative. MEDICATIONS AT THIS TIME:  He is on aspirin 81 mg daily, Coreg 6.25 mg b.i.d., fish oil daily, Lasix 20 mg t.i.d., Apresoline 25 mg half a tablet every 8 hours, Imdur 30 mg daily, potassium 10 mEq daily, red yeast rice daily, Coumadin as directed. PAST MEDICAL HISTORY AND SURGICAL HISTORY:  1. Cardiac as above. 2.  Hypertension. 3.  Rotator cuff on his right side many years ago. 4.  Left knee meniscal surgery by Dr. Zeynep Enriquez on 10/15/2015.  5.  Catheterization on 05/05/2015, with a right carotid endarterectomy on 06/09/2015. 6.  Bypass surgery on 06/10/2015, as stated previously. 7.  Repeat catheterization on 01/24/2018. 8.  Broken left ankle many years ago. FAMILY HISTORY:  Significant for CAD, with his mother having an MI. Father had an MI. Four brothers and 2 sisters, alive and well. SOCIAL HISTORY:  He is 71years old, . Three children, the youngest 24-year-old by the name of Charu Moe, who is not living with Alexa Holden at this time. He is unfortunately stuck in [facility] with some social issues. Daughter, 37, is a disabled . Son, 36, a medic in the [employer]. Alexa Holden has been  for 14 years. Does not smoke or drink alcohol. 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 REVIEW OF SYSTEMS:  Cardiac as above. Other systems reviewed including constitutional, eyes, ears, nose and throat, cardiovascular, respiratory, GI, , musculoskeletal, integumentary, neurologic, endocrine, hematologic and allergic/immunologic are negative except for what is described above. No weight loss or weight gain. No change in bowel habits. No blood in stools. No fevers, sweats or chills. PHYSICAL EXAMINATION:  VITAL SIGNS:  His blood pressure was 120/88 with a heart rate of 84 and regular. Respirations were 18. O2 saturation 96%. Weight 212 pounds. GENERAL:  He is a pleasant 66-year-old gentleman. Denied pain. He was oriented to person, place and time. Answered questions appropriately. SKIN:  No unusual skin changes. HEENT:  The pupils are equally round and intact. Mucous membranes were dry. NECK:  No JVD. Good carotid pulses. No carotid bruits. No lymphadenopathy or thyromegaly. CARDIOVASCULAR EXAM:  S1 and S2 were normal.  No S3 or S4. Soft systolic blowing type murmur. No diastolic murmur. PMI was normal.  No lift, thrust, or pericardial friction rub. LUNGS:  Quite clear to auscultation and percussion. ABDOMEN:  Soft and nontender. Good bowel sounds. EXTREMITIES:  Good femoral pulses. Good pedal pulses. No pedal edema. Skin was warm and dry. No calf tenderness. Nail beds pink. Good cap refill. PULSES:  Bilateral symmetrical radial, brachial and carotid pulses. No carotid bruits. Good femoral and pedal pulses. NEUROLOGIC EXAM:  Within normal limits. PSYCHIATRIC EXAM:  Within normal limits. LABORATORY DATA:  Pending. Bedside echocardiogram showed an EF in the 45% range. IMPRESSION:  1. Severe CAD. 2.  Abnormal stress test with a catheterization on 05/05/2015, showing 90% proximal LAD, 80% PDA, which arose from the circumflex, 80% small right coronary artery, EF of 50%. 3.  Severe peripheral vascular disease with 85% disease in the right carotid artery, with 50% to 60% disease in the left carotid artery. 4.  Right carotid endarterectomy by Dr. Gumaro Waller on 06/09/2015.  5.  Open heart surgery by Dr. Whitley Saha on 06/10/2015, with a LIMA to the LAD and a vein graft to the PDA, which arose from the circumflex. 6.  Severe hypertension. 7.  Severe hyperlipidemia, untreated, as he does not wish to take statins. 8.  Idiopathic cardiomyopathy in 01/2018 with an EF at 20% to 25%. 9.  Catheterization on 01/24/2018, that showed patent LIMA to the LAD, with an occluded vein graft to the PDA, which arose from the circumflex and occluded native PDA, with 50% to 55% at the ostium of the circumflex, with a small nondominant right coronary artery, EF of 30%. 10.  EF improving to 45% by bedside echocardiogram.  11.  Asymptomatic at this time. DISCUSSION:  Mr. Majel Seip overall is doing well. He really has no symptoms of any unusual shortness of breath, loss of energy or chest pain. He does have slightly more shortness of breath but attributes this to not being on a rigid exercise program.    He has done excellent with his weight, by losing approximately 15 pounds and is at his ideal weight at this point. He will get blood work on the way out today. I will plan on seeing him in 1 year unless a problem would develop. Thank you very much for allowing me the privilege of seeing Mr. Ventura. If you have any questions on my thoughts, please do not hesitate to contact me.     Sincerely,        Oscar Palomares    D: 10/15/2020 10:09:24     T: 10/15/2020 14:30:55     [email or web address]  Job#: [id number]   Doc#: [id number]      <>

## 2020-10-15 NOTE — TELEPHONE ENCOUNTER
Tried calling patient with his lab results. No answer and did not have a voicemail set up to leave a message.

## 2020-10-20 NOTE — PROGRESS NOTES
Marlene Fabian M.D. [address] [place] [address]  [phone]        October 15, 2020        Zahraa Galeas MD  [address] [address]    RE:   Chiki Wing  :  1950    Dear Dr. Parks Serum:    CHIEF COMPLAINT:  1. Coronary artery disease. 2.  Idiopathic cardiomyopathy, which has improved from 20% to close to 45% on a bedside echocardiogram today. HISTORY OF PRESENT ILLNESS:  I saw Mr. Ventura in the office on 10/15/2020. He is a pleasant 63-year-old gentleman who I met on 2015 for cardiac clearance for left knee replacement. He was hypertensive, had never been on medication. Had nonspecific ST changes with very strong family history of coronary artery disease. He had a markedly abnormal stress test, which resulted in a catheterization on 2015, at [facility] that showed 90% disease in the LAD in the proximal portion, 80% disease in the circumflex, 80% disease in the small right coronary artery, EF of 50%. He also had critical right carotid artery disease. He had a right carotid endarterectomy by Dr. Doron Winters on 2015. The following day on 06/10/2015, he had open heart surgery by Dr. Dar Bowen with a LIMA to the LAD and a vein graft to the PDA, which came off the circumflex system. He had subsequent left meniscus surgery by Dr. Cherry Mcarthur on 10/15/2015, from which he made a good recovery. He had flu syndrome in 2017 and went to the emergency room on 2018. Chest x-ray showed right lower lobe pneumonia. A CT scan of his chest was negative for PE. He was admitted for CHF and pneumonia. An echocardiogram on 2018 showed an EF of 25% with moderate-to-severe mitral regurgitation.   Repeating a catheterization on 2018 showed a patent LIMA to the LAD, circumflex had 50% disease in the ostium with an FFR of 0.98, the PDA was occluded, which arose from the circumflex, the vein graft to refill. PULSES:  Bilateral symmetrical radial, brachial and carotid pulses. No carotid bruits. Good femoral and pedal pulses. NEUROLOGIC EXAM:  Within normal limits. PSYCHIATRIC EXAM:  Within normal limits. LABORATORY DATA:  Pending. Bedside echocardiogram showed an EF in the 45% range. IMPRESSION:  1. Severe CAD. 2.  Abnormal stress test with a catheterization on 05/05/2015, showing 90% proximal LAD, 80% PDA, which arose from the circumflex, 80% small right coronary artery, EF of 50%. 3.  Severe peripheral vascular disease with 85% disease in the right carotid artery, with 50% to 60% disease in the left carotid artery. 4.  Right carotid endarterectomy by Dr. Nomi West on 06/09/2015.  5.  Open heart surgery by Dr. Simon Torres on 06/10/2015, with a LIMA to the LAD and a vein graft to the PDA, which arose from the circumflex. 6.  Severe hypertension. 7.  Severe hyperlipidemia, untreated, as he does not wish to take statins. 8.  Idiopathic cardiomyopathy in 01/2018 with an EF at 20% to 25%. 9.  Catheterization on 01/24/2018, that showed patent LIMA to the LAD, with an occluded vein graft to the PDA, which arose from the circumflex and occluded native PDA, with 50% to 55% at the ostium of the circumflex, with a small nondominant right coronary artery, EF of 30%. 10.  EF improving to 45% by bedside echocardiogram.  11.  Asymptomatic at this time. DISCUSSION:  Mr. Adrianna Panchal overall is doing well. He really has no symptoms of any unusual shortness of breath, loss of energy or chest pain. He does have slightly more shortness of breath but attributes this to not being on a rigid exercise program.    He has done excellent with his weight, by losing approximately 15 pounds and is at his ideal weight at this point. He will get blood work on the way out today. I will plan on seeing him in 1 year unless a problem would develop. Thank you very much for allowing me the privilege of seeing Mr. Ventura.   If

## 2020-11-09 RX ORDER — CARVEDILOL 6.25 MG/1
TABLET ORAL
Qty: 180 TABLET | Refills: 1 | Status: SHIPPED | OUTPATIENT
Start: 2020-11-09 | End: 2021-03-25

## 2020-11-16 RX ORDER — WARFARIN SODIUM 6 MG/1
TABLET ORAL
Qty: 90 TABLET | Refills: 3 | Status: SHIPPED | OUTPATIENT
Start: 2020-11-16 | End: 2021-01-07 | Stop reason: DRUGHIGH

## 2020-11-16 RX ORDER — FUROSEMIDE 20 MG/1
TABLET ORAL
Qty: 270 TABLET | Refills: 3 | Status: SHIPPED | OUTPATIENT
Start: 2020-11-16 | End: 2021-08-23

## 2020-11-16 RX ORDER — HYDRALAZINE HYDROCHLORIDE 25 MG/1
TABLET, FILM COATED ORAL
Qty: 135 TABLET | Refills: 3 | Status: SHIPPED | OUTPATIENT
Start: 2020-11-16 | End: 2021-08-23

## 2020-11-16 RX ORDER — ISOSORBIDE MONONITRATE 30 MG/1
TABLET, EXTENDED RELEASE ORAL
Qty: 90 TABLET | Refills: 3 | Status: SHIPPED | OUTPATIENT
Start: 2020-11-16 | End: 2021-08-23

## 2020-11-16 RX ORDER — POTASSIUM CHLORIDE 750 MG/1
TABLET, EXTENDED RELEASE ORAL
Qty: 180 TABLET | Refills: 3 | Status: SHIPPED | OUTPATIENT
Start: 2020-11-16 | End: 2021-08-23

## 2020-12-28 ENCOUNTER — ANTI-COAG VISIT (OUTPATIENT)
Dept: PHARMACY | Age: 70
End: 2020-12-28

## 2021-01-07 ENCOUNTER — HOSPITAL ENCOUNTER (OUTPATIENT)
Dept: PHARMACY | Age: 71
Setting detail: THERAPIES SERIES
Discharge: HOME | End: 2021-01-07
Payer: MEDICARE

## 2021-01-07 VITALS — WEIGHT: 219.2 LBS | BODY MASS INDEX: 27.4 KG/M2

## 2021-01-07 LAB — INR BLD: 1.5

## 2021-01-07 PROCEDURE — 85610 PROTHROMBIN TIME: CPT

## 2021-01-07 PROCEDURE — 99211 OFF/OP EST MAY X REQ PHY/QHP: CPT

## 2021-01-07 RX ORDER — WARFARIN SODIUM 6 MG/1
TABLET ORAL
Qty: 90 TABLET | Refills: 3 | Status: SHIPPED
Start: 2021-01-07 | End: 2021-08-23

## 2021-02-26 ENCOUNTER — TELEPHONE (OUTPATIENT)
Dept: PHARMACY | Age: 71
End: 2021-02-26

## 2021-02-26 NOTE — TELEPHONE ENCOUNTER
COVID-19 phone screening     Call placed to screen patient prior to upcoming Medication Management visit for Anticoagulation. Does patient have fever and/or lower respiratory symptoms (SOB, difficulty breathing, cough)? [] Yes    [x] No    If yes, complete Travel Screening and ask the following:   [] [Fever OR s/sxs of lower respiratory illness]  AND  [close contact with lab-confirmed COVID-19 patient within 14 days of symptom onset   [] [Fever AND s/sxs of lower respiratory illness requiring hospitalization] AND [history of travel to [place], [place], [place], [facility], [place] [employer] [place] within 14 days of sx onset]  [] [Fever with severe acute lower respiratory illness (I.e. PNA, ARDS) requiring hospitalization and without alternative explanatory diagnosis (I.e. Influenza)] AND [no source of exposure identified]    [x] None of the following; patient confirmed for regularly scheduled INR check and instructed to call the clinic immediately if any symptoms develop prior to upcoming appointment.

## 2021-03-01 ENCOUNTER — HOSPITAL ENCOUNTER (OUTPATIENT)
Dept: PHARMACY | Age: 71
Setting detail: THERAPIES SERIES
Discharge: HOME | End: 2021-03-01
Payer: MEDICARE

## 2021-03-01 VITALS
HEART RATE: 73 BPM | WEIGHT: 215.6 LBS | BODY MASS INDEX: 26.95 KG/M2 | DIASTOLIC BLOOD PRESSURE: 88 MMHG | SYSTOLIC BLOOD PRESSURE: 129 MMHG

## 2021-03-01 LAB — INR BLD: 3.4

## 2021-03-01 PROCEDURE — 85610 PROTHROMBIN TIME: CPT

## 2021-03-01 PROCEDURE — 99211 OFF/OP EST MAY X REQ PHY/QHP: CPT

## 2021-03-01 NOTE — PROGRESS NOTES
Fingerstick INR drawn per clinic protocol. Patient states no visible blood in urine and no black tarry stool. Denies any missed doses of warfarin. He says he usually doesn't take his warfarin dose until after his appointment here in the clinic, but has already taken his dose this morning. Upon reviewing his medications, Jessica Oseguera tells me that he is no longer taking bee pollen supplement, but has been taking \"more\" Vitamin E than he had been previously (which can increase INR). He also says he hasn't been eating many \"greens\" at all recently, but will plan to incorporate these into his dietary routine more in the next several weeks. No change in other maintenance medications. Although his INR is slightly supra-therapeutic, Jessica Oseguera will plan to increase his \"greens\" so we will continue current weekly warfarin regimen as noted on his dosing calendar and recheck INR in 6 weeks per patient request. Patient acknowledges working in consult agreement with pharmacist as referred by his/her physician.       CLINICAL PHARMACY CONSULT: MED RECONCILIATION/REVIEW ADDENDUM    For Pharmacy Admin Tracking Only    PHSO: No  Total # of Interventions Recommended: 3  - Discontinued Medication #: 1 Discontinue Reason(s): Patient Preference   -Updated order #:1  - Maintenance Safety Lab Monitoring #: 1    Total Interventions Accepted: 3  Time Spent (min): 30    Rosa Phalen, PharmD

## 2021-03-25 RX ORDER — CARVEDILOL 6.25 MG/1
TABLET ORAL
Qty: 180 TABLET | Refills: 1 | Status: SHIPPED | OUTPATIENT
Start: 2021-03-25 | End: 2021-08-09

## 2021-04-12 ENCOUNTER — HOSPITAL ENCOUNTER (OUTPATIENT)
Dept: PHARMACY | Age: 71
Setting detail: THERAPIES SERIES
Discharge: HOME | End: 2021-04-12
Payer: MEDICARE

## 2021-04-12 VITALS
WEIGHT: 217 LBS | BODY MASS INDEX: 27.12 KG/M2 | DIASTOLIC BLOOD PRESSURE: 79 MMHG | HEART RATE: 78 BPM | SYSTOLIC BLOOD PRESSURE: 110 MMHG

## 2021-04-12 LAB — INR BLD: 2.3

## 2021-04-12 PROCEDURE — 99211 OFF/OP EST MAY X REQ PHY/QHP: CPT

## 2021-04-12 PROCEDURE — 85610 PROTHROMBIN TIME: CPT

## 2021-04-12 NOTE — PROGRESS NOTES
[facility]  Medication Management  ANTICOAGULATION    Referring Doctor: Dr. Megan Sweeney INR: 2-3    TODAY'S INR: 2.3    WARFARIN Dosage: 6 mg Mon/Fri and 3 mg all other days of the week    INR (no units)   Date Value   04/12/2021 2.3   03/01/2021 3.4   01/07/2021 1.5   07/21/2020 2.3   07/21/2020 2.3   02/27/2020 3.1   01/23/2020 1.6   12/02/2019 2.2       Medication changes:  None    Notes:    Fingerstick INR drawn per clinic protocol. Patient states no visible blood in urine and no black tarry stool. Denies any missed doses of warfarin. No change in other maintenance medications or in diet. Will recheck INR in 6 weeks. Patient acknowledges working in consult agreement with pharmacist as referred by his/her physician.                   CLINICAL PHARMACY CONSULT: MED RECONCILIATION/REVIEW ADDENDUM    For Pharmacy Admin Tracking Only    PHSO: No  Total # of Interventions Recommended: 1    - Maintenance Safety Lab Monitoring #: 1  Total Interventions Accepted: 1  Time Spent (min): 30    Abeba Wright, SandraD

## 2021-08-09 RX ORDER — CARVEDILOL 6.25 MG/1
TABLET ORAL
Qty: 180 TABLET | Refills: 1 | Status: SHIPPED | OUTPATIENT
Start: 2021-08-09 | End: 2022-07-25 | Stop reason: SDUPTHER

## 2021-08-10 ENCOUNTER — HOSPITAL ENCOUNTER (OUTPATIENT)
Dept: PHARMACY | Age: 71
Setting detail: THERAPIES SERIES
Discharge: HOME | End: 2021-08-10
Payer: MEDICARE

## 2021-08-10 VITALS
WEIGHT: 214.6 LBS | BODY MASS INDEX: 26.82 KG/M2 | SYSTOLIC BLOOD PRESSURE: 135 MMHG | DIASTOLIC BLOOD PRESSURE: 72 MMHG | HEART RATE: 59 BPM

## 2021-08-10 LAB — INR BLD: 2.3

## 2021-08-10 PROCEDURE — 99211 OFF/OP EST MAY X REQ PHY/QHP: CPT

## 2021-08-10 PROCEDURE — 85610 PROTHROMBIN TIME: CPT

## 2021-08-10 NOTE — PROGRESS NOTES
[place] [address] [place] [address]/[place]  Medication Management  ANTICOAGULATION    Referring Provider: Dr. Darrick Mcardle INR: 2-3    TODAY'S INR: 2.3    WARFARIN Dosage: 6 mg Mon/Fri and 3 mg all other days of the week     INR (no units)   Date Value   08/10/2021 2.3   2021 2.3   2021 3.4   2021 1.5   2020 2.3   2020 2.3   2020 3.1   2020 1.6       Medication changes:  None    Notes:    Fingerstick INR drawn per clinic protocol. Patient states no visible blood in urine and no black tarry stool. Denies any missed doses of warfarin. No change in other maintenance medications or in diet. Will recheck INR in 6 weeks. Patient acknowledges working in consult agreement with pharmacist as referred by his/her physician.                   For Pharmacy Admin Tracking Only     Intervention Detail: Adherence Monitorin   Total # of Interventions Recommended: 1   Total # of Interventions Accepted: 1   Time Spent (min): 409 [address], [address] [facility], PharmD

## 2021-08-23 RX ORDER — ISOSORBIDE MONONITRATE 30 MG/1
TABLET, EXTENDED RELEASE ORAL
Qty: 90 TABLET | Refills: 3 | Status: SHIPPED | OUTPATIENT
Start: 2021-08-23

## 2021-08-23 RX ORDER — POTASSIUM CHLORIDE 750 MG/1
TABLET, EXTENDED RELEASE ORAL
Qty: 180 TABLET | Refills: 3 | Status: SHIPPED | OUTPATIENT
Start: 2021-08-23 | End: 2022-07-25 | Stop reason: SDUPTHER

## 2021-08-23 RX ORDER — FUROSEMIDE 20 MG/1
TABLET ORAL
Qty: 270 TABLET | Refills: 3 | Status: SHIPPED | OUTPATIENT
Start: 2021-08-23 | End: 2022-07-25 | Stop reason: SDUPTHER

## 2021-08-23 RX ORDER — WARFARIN SODIUM 6 MG/1
TABLET ORAL
Qty: 90 TABLET | Refills: 3 | Status: SHIPPED | OUTPATIENT
Start: 2021-08-23 | End: 2021-09-21 | Stop reason: DRUGHIGH

## 2021-08-23 RX ORDER — HYDRALAZINE HYDROCHLORIDE 25 MG/1
TABLET, FILM COATED ORAL
Qty: 135 TABLET | Refills: 3 | Status: SHIPPED | OUTPATIENT
Start: 2021-08-23 | End: 2022-07-25 | Stop reason: SDUPTHER

## 2021-09-21 ENCOUNTER — HOSPITAL ENCOUNTER (OUTPATIENT)
Dept: PHARMACY | Age: 71
Setting detail: THERAPIES SERIES
Discharge: HOME | End: 2021-09-21
Payer: MEDICARE

## 2021-09-21 VITALS
BODY MASS INDEX: 26 KG/M2 | SYSTOLIC BLOOD PRESSURE: 145 MMHG | WEIGHT: 208 LBS | HEART RATE: 78 BPM | DIASTOLIC BLOOD PRESSURE: 101 MMHG

## 2021-09-21 LAB — INR BLD: 3.2

## 2021-09-21 PROCEDURE — 85610 PROTHROMBIN TIME: CPT

## 2021-09-21 PROCEDURE — 99211 OFF/OP EST MAY X REQ PHY/QHP: CPT

## 2021-09-21 RX ORDER — WARFARIN SODIUM 6 MG/1
TABLET ORAL
Qty: 90 TABLET | Refills: 3 | Status: SHIPPED
Start: 2021-09-21 | End: 2022-07-25 | Stop reason: SDUPTHER

## 2021-09-21 NOTE — PROGRESS NOTES
[place] [address] [place] [address]/[place]  Medication Management  ANTICOAGULATION    Referring Provider: Dr. Aditya Almazan     GOAL INR: 2-3     TODAY'S INR: 3.2     WARFARIN Dosage: 6 mg Mon/Fri and 3 mg all other days of the week     INR (no units)   Date Value   2021 3.2   08/10/2021 2.3   2021 2.3   2021 3.4   2021 1.5   2020 2.3   2020 2.3   2020 3.1       Medication changes:  None    Notes:    Fingerstick INR drawn per clinic protocol. Patient states no visible blood in urine and no black tarry stool. Denies any missed doses of warfarin. Upon reviewing his medications, Cleveland Vera tells me that he has been taking \"a little more\" Vitamin E \"sometimes\" (which can increase INR). No change in other maintenance medications or in diet. His BP is significantly more elevated today as well, but blames it on [facility] with a friend\" earlier this morning. He has a follow up appointment scheduled with Dr. Clarita Lantigua on 10/11/2021. Although his INR is slightly elevated today (most likely due to increased vitamin E intake recently), we will continue current weekly warfarin regimen as noted on his dosing calendar and recheck INR in 6 weeks. Patient acknowledges working in consult agreement with pharmacist as referred by his/her physician.                   For Pharmacy Admin Tracking Only     Intervention Detail: Adherence Monitorin   Total # of Interventions Recommended: 1   Total # of Interventions Accepted: 1   Time Spent (min): 409 [address], [address] [facility], [place]

## 2021-10-06 DIAGNOSIS — I25.10 CORONARY ARTERY DISEASE DUE TO LIPID RICH PLAQUE: Primary | ICD-10-CM

## 2021-10-06 DIAGNOSIS — I10 ESSENTIAL HYPERTENSION: ICD-10-CM

## 2021-10-06 DIAGNOSIS — E78.00 PURE HYPERCHOLESTEROLEMIA: ICD-10-CM

## 2021-10-06 DIAGNOSIS — I25.83 CORONARY ARTERY DISEASE DUE TO LIPID RICH PLAQUE: Primary | ICD-10-CM

## 2021-10-06 DIAGNOSIS — E55.9 VITAMIN D DEFICIENCY: ICD-10-CM

## 2021-10-06 DIAGNOSIS — I48.92 ATRIAL FLUTTER WITH RAPID VENTRICULAR RESPONSE (HCC): ICD-10-CM

## 2021-10-07 ENCOUNTER — HOSPITAL ENCOUNTER (OUTPATIENT)
Age: 71
Discharge: HOME | End: 2021-10-09
Payer: MEDICARE

## 2021-10-07 ENCOUNTER — HOSPITAL ENCOUNTER (OUTPATIENT)
Age: 71
Discharge: HOME | End: 2021-10-07
Payer: MEDICARE

## 2021-10-07 ENCOUNTER — HOSPITAL ENCOUNTER (OUTPATIENT)
Dept: GENERAL RADIOLOGY | Age: 71
Discharge: HOME | End: 2021-10-09
Payer: MEDICARE

## 2021-10-07 DIAGNOSIS — E78.00 PURE HYPERCHOLESTEROLEMIA: ICD-10-CM

## 2021-10-07 DIAGNOSIS — I48.92 ATRIAL FLUTTER WITH RAPID VENTRICULAR RESPONSE (HCC): ICD-10-CM

## 2021-10-07 DIAGNOSIS — I25.10 CORONARY ARTERY DISEASE DUE TO LIPID RICH PLAQUE: ICD-10-CM

## 2021-10-07 DIAGNOSIS — E55.9 VITAMIN D DEFICIENCY: ICD-10-CM

## 2021-10-07 DIAGNOSIS — I25.83 CORONARY ARTERY DISEASE DUE TO LIPID RICH PLAQUE: ICD-10-CM

## 2021-10-07 DIAGNOSIS — I10 ESSENTIAL HYPERTENSION: ICD-10-CM

## 2021-10-07 PROCEDURE — 93005 ELECTROCARDIOGRAM TRACING: CPT

## 2021-10-07 PROCEDURE — 71046 X-RAY EXAM CHEST 2 VIEWS: CPT

## 2021-10-10 ENCOUNTER — HOSPITAL ENCOUNTER (OUTPATIENT)
Age: 71
Discharge: HOME | End: 2021-10-10
Payer: MEDICARE

## 2021-10-10 DIAGNOSIS — I48.92 ATRIAL FLUTTER WITH RAPID VENTRICULAR RESPONSE (HCC): ICD-10-CM

## 2021-10-10 DIAGNOSIS — I25.83 CORONARY ARTERY DISEASE DUE TO LIPID RICH PLAQUE: ICD-10-CM

## 2021-10-10 DIAGNOSIS — E78.00 PURE HYPERCHOLESTEROLEMIA: ICD-10-CM

## 2021-10-10 DIAGNOSIS — I10 ESSENTIAL HYPERTENSION: ICD-10-CM

## 2021-10-10 DIAGNOSIS — I25.10 CORONARY ARTERY DISEASE DUE TO LIPID RICH PLAQUE: ICD-10-CM

## 2021-10-10 DIAGNOSIS — E55.9 VITAMIN D DEFICIENCY: ICD-10-CM

## 2021-10-10 LAB
ABSOLUTE EOS #: 0.1 K/UL (ref 0–0.4)
ABSOLUTE IMMATURE GRANULOCYTE: NORMAL K/UL (ref 0–0.3)
ABSOLUTE LYMPH #: 1.4 K/UL (ref 1–4.8)
ABSOLUTE MONO #: 0.4 K/UL (ref 0–1)
ALBUMIN SERPL-MCNC: 4.5 G/DL (ref 3.5–5.2)
ALBUMIN/GLOBULIN RATIO: NORMAL (ref 1–2.5)
ALP BLD-CCNC: 74 U/L (ref 40–129)
ALT SERPL-CCNC: 16 U/L (ref 5–41)
ANION GAP SERPL CALCULATED.3IONS-SCNC: 12 MMOL/L (ref 9–17)
AST SERPL-CCNC: 18 U/L
BASOPHILS # BLD: 0 % (ref 0–2)
BASOPHILS ABSOLUTE: 0 K/UL (ref 0–0.2)
BILIRUB SERPL-MCNC: 0.82 MG/DL (ref 0.3–1.2)
BUN BLDV-MCNC: 17 MG/DL (ref 8–23)
BUN/CREAT BLD: 19 (ref 9–20)
CALCIUM SERPL-MCNC: 9.4 MG/DL (ref 8.6–10.4)
CHLORIDE BLD-SCNC: 103 MMOL/L (ref 98–107)
CHOLESTEROL/HDL RATIO: 6.9
CHOLESTEROL: 276 MG/DL
CO2: 24 MMOL/L (ref 20–31)
CREAT SERPL-MCNC: 0.9 MG/DL (ref 0.7–1.2)
DIFFERENTIAL TYPE: YES
EKG ATRIAL RATE: 65 BPM
EKG P AXIS: 69 DEGREES
EKG P-R INTERVAL: 160 MS
EKG Q-T INTERVAL: 458 MS
EKG QRS DURATION: 126 MS
EKG QTC CALCULATION (BAZETT): 476 MS
EKG R AXIS: 11 DEGREES
EKG T AXIS: 60 DEGREES
EKG VENTRICULAR RATE: 65 BPM
EOSINOPHILS RELATIVE PERCENT: 2 % (ref 0–5)
GFR AFRICAN AMERICAN: >60 ML/MIN
GFR NON-AFRICAN AMERICAN: >60 ML/MIN
GFR SERPL CREATININE-BSD FRML MDRD: NORMAL ML/MIN/{1.73_M2}
GFR SERPL CREATININE-BSD FRML MDRD: NORMAL ML/MIN/{1.73_M2}
GLUCOSE BLD-MCNC: 72 MG/DL (ref 70–99)
HCT VFR BLD CALC: 49.1 % (ref 41–53)
HDLC SERPL-MCNC: 40 MG/DL
HEMOGLOBIN: 17 G/DL (ref 13.5–17.5)
IMMATURE GRANULOCYTES: NORMAL %
LDL CHOLESTEROL: 167 MG/DL (ref 0–130)
LYMPHOCYTES # BLD: 23 % (ref 13–44)
MAGNESIUM: 2.4 MG/DL (ref 1.6–2.6)
MCH RBC QN AUTO: 31.9 PG (ref 26–34)
MCHC RBC AUTO-ENTMCNC: 34.6 G/DL (ref 31–37)
MCV RBC AUTO: 92.2 FL (ref 80–100)
MONOCYTES # BLD: 7 % (ref 5–9)
NRBC AUTOMATED: NORMAL PER 100 WBC
PATIENT FASTING?: YES
PDW BLD-RTO: 12.6 % (ref 12.1–15.2)
PLATELET # BLD: 169 K/UL (ref 140–450)
PLATELET ESTIMATE: NORMAL
PMV BLD AUTO: NORMAL FL (ref 6–12)
POTASSIUM SERPL-SCNC: 4.3 MMOL/L (ref 3.7–5.3)
RBC # BLD: 5.33 M/UL (ref 4.5–5.9)
RBC # BLD: NORMAL 10*6/UL
SEG NEUTROPHILS: 68 % (ref 39–75)
SEGMENTED NEUTROPHILS ABSOLUTE COUNT: 4 K/UL (ref 2.1–6.5)
SODIUM BLD-SCNC: 139 MMOL/L (ref 135–144)
TOTAL PROTEIN: 7.4 G/DL (ref 6.4–8.3)
TRIGL SERPL-MCNC: 347 MG/DL
TSH SERPL DL<=0.05 MIU/L-ACNC: 0.8 MIU/L (ref 0.3–5)
VITAMIN D 25-HYDROXY: 69.2 NG/ML (ref 30–100)
VLDLC SERPL CALC-MCNC: ABNORMAL MG/DL (ref 1–30)
WBC # BLD: 5.9 K/UL (ref 3.5–11)
WBC # BLD: NORMAL 10*3/UL

## 2021-10-10 PROCEDURE — 80061 LIPID PANEL: CPT

## 2021-10-10 PROCEDURE — 93010 ELECTROCARDIOGRAM REPORT: CPT | Performed by: INTERNAL MEDICINE

## 2021-10-10 PROCEDURE — 80053 COMPREHEN METABOLIC PANEL: CPT

## 2021-10-10 PROCEDURE — 85025 COMPLETE CBC W/AUTO DIFF WBC: CPT

## 2021-10-10 PROCEDURE — 84443 ASSAY THYROID STIM HORMONE: CPT

## 2021-10-10 PROCEDURE — 36415 COLL VENOUS BLD VENIPUNCTURE: CPT

## 2021-10-10 PROCEDURE — 82306 VITAMIN D 25 HYDROXY: CPT

## 2021-10-10 PROCEDURE — 83735 ASSAY OF MAGNESIUM: CPT

## 2021-10-11 ENCOUNTER — OFFICE VISIT (OUTPATIENT)
Dept: CARDIOLOGY CLINIC | Age: 71
End: 2021-10-11
Payer: MEDICARE

## 2021-10-11 VITALS
HEART RATE: 68 BPM | WEIGHT: 211 LBS | SYSTOLIC BLOOD PRESSURE: 120 MMHG | OXYGEN SATURATION: 98 % | BODY MASS INDEX: 26.37 KG/M2 | DIASTOLIC BLOOD PRESSURE: 80 MMHG

## 2021-10-11 DIAGNOSIS — I25.10 CORONARY ARTERY DISEASE DUE TO LIPID RICH PLAQUE: Primary | ICD-10-CM

## 2021-10-11 DIAGNOSIS — I48.92 ATRIAL FLUTTER WITH RAPID VENTRICULAR RESPONSE (HCC): ICD-10-CM

## 2021-10-11 DIAGNOSIS — E55.9 VITAMIN D DEFICIENCY: ICD-10-CM

## 2021-10-11 DIAGNOSIS — E78.00 PURE HYPERCHOLESTEROLEMIA: ICD-10-CM

## 2021-10-11 DIAGNOSIS — I10 ESSENTIAL HYPERTENSION: ICD-10-CM

## 2021-10-11 DIAGNOSIS — I25.83 CORONARY ARTERY DISEASE DUE TO LIPID RICH PLAQUE: Primary | ICD-10-CM

## 2021-10-11 PROCEDURE — G8427 DOCREV CUR MEDS BY ELIG CLIN: HCPCS | Performed by: INTERNAL MEDICINE

## 2021-10-11 PROCEDURE — 1123F ACP DISCUSS/DSCN MKR DOCD: CPT | Performed by: INTERNAL MEDICINE

## 2021-10-11 PROCEDURE — 1036F TOBACCO NON-USER: CPT | Performed by: INTERNAL MEDICINE

## 2021-10-11 PROCEDURE — G8484 FLU IMMUNIZE NO ADMIN: HCPCS | Performed by: INTERNAL MEDICINE

## 2021-10-11 PROCEDURE — 3017F COLORECTAL CA SCREEN DOC REV: CPT | Performed by: INTERNAL MEDICINE

## 2021-10-11 PROCEDURE — G8417 CALC BMI ABV UP PARAM F/U: HCPCS | Performed by: INTERNAL MEDICINE

## 2021-10-11 PROCEDURE — 4040F PNEUMOC VAC/ADMIN/RCVD: CPT | Performed by: INTERNAL MEDICINE

## 2021-10-11 PROCEDURE — 99214 OFFICE O/P EST MOD 30 MIN: CPT | Performed by: INTERNAL MEDICINE

## 2021-10-11 NOTE — LETTER
Gunner Bhatti MD  [facility]  [address], [place] [address]  [phone]      2021      Magdalena Carias MD  [address] [facility], [facility] [address]      RE:   Paige Self  :  1950      Dear Dr. Sofia Yee:    CHIEF COMPLAINT:  1. Coronary artery disease. 2.  Status post open heart surgery by Dr. Bernie Quan on 06/10/2015. HISTORY OF PRESENT ILLNESS:  I had the pleasure of seeing Mr. Ventura in the office on 10/11/2021. He is a pleasant 72-year-old gentleman who I met on 2015 for cardiac clearance for left knee replacement. He had never been on medications. He was very hypertensive. He has a strong family history of coronary artery disease and he has marked hyperlipidemia. He had a stress test on 2015, which was markedly abnormal.  This resulted in a cardiac catheterization on 2015 that showed 90% LAD in the proximal portion, 80% circumflex, 80% small right coronary artery, EF 50%. He had critical right carotid artery disease and had a right carotid endarterectomy by Dr. Marianela Allison on 2015. On 06/10/2015, he had open heart surgery by Dr. Bernie Quan with a LIMA to the LAD and a vein graft to the PDA. He had subsequent left meniscus surgery by Dr. Jean-Claude Alcantar on 10/15/2015, from which he made a good recovery. He had flu syndrome in 2017 and came to the emergency room on 2018. He had a right lower lobe pneumonia. CT scan was negative for PE and he was hospitalized for pneumonia. Echocardiogram on 2018 showed an EF of 25% with moderate-to-severe mitral regurgitation. Repeat catheterization on 2018 showed patent LIMA to the LAD, circumflex had 50% disease in the ostium with an FFR of 0.98, PDA was occluded, which arose from the circumflex, the vein graft to the PDA was occluded, the right coronary artery was small and nondominant, EF was 30% to 35%.     We placed him in cardiac rehab and medical therapy. He did develop atrial fibrillation on 01/05/2018, with a heart rate of 120 to 130 and was placed on Xarelto. He converted to sinus rhythm and is currently on Coumadin. He has severe hyperlipidemia with an LDL in the 160 to 170 range and will not take any statins as it \"almost killed him. \"    He continues to do well. He has had no hospitalizations or procedures. He also has a fair amount of stress in his life with his son, Mk Christine. 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 He continues to work out. He has had no syncope or near syncope, lightheadedness or dizziness. He really has no complaints as I see him today. CARDIAC RISK FACTORS:  Known CAD:  Positive. Peripheral Vascular Disease:  Positive. Hyperlipidemia:  Positive. Other Family Members:  Positive. Diabetes:  Negative. Smoking:  Negative. MEDICATIONS AT THIS TIME:  Aspirin 81 mg daily, Coreg 6.25 mg b.i.d., vitamin D 2000 units daily, fish oil daily, Lasix 20 mg t.i.d., Apresoline 25 mg half a tablet every 8 hours, Imdur 30 mg daily, potassium 120 mg b.i.d., Coumadin as directed. PAST MEDICAL HISTORY AND SURGICAL HISTORY:  1. Cardiac as above. 2.  Hypertension. 3.  Rotator cuff on the right side many years ago. 4.  Left knee meniscal surgery by Dr. Hsu Both on 10/15/2015.  5.  Catheterization on 05/05/2015, with right carotid endarterectomy on 06/09/2015. 6.  Bypass surgery on 06/10/2015.  7.  Repeat catheterization on 01/24/2018.  8.  Broken ankle many years ago. 9.  Severe hyperlipidemia, untreated. FAMILY HISTORY:  Significant for CAD. Mother had an MI. Father had an MI. Four brothers and two sisters, alive and well. SOCIAL HISTORY:  He is 79years old, . Three children, the youngest son, Mk Christine, is 25years old. Estephania Lund has a restraining order against him. His daughter, 40, disabled . Son, 39, is a medic. Estephania Lund has been  for 15 years. He has a girlfriend. 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 REVIEW OF SYSTEMS:  Cardiac as above. Other systems reviewed including constitutional, eyes, ears, nose and throat, cardiovascular, respiratory, GI, , musculoskeletal, integumentary, neurologic, endocrine, hematologic and allergic/immunologic are negative except for what is described above. No weight loss or weight gain. No change in bowel habits. No blood in stools. No fevers, sweats or chills. PHYSICAL EXAMINATION:  VITAL SIGNS:  His blood pressure was 120/80 with a heart rate of 68 and regular. Respiratory rate 18. O2 saturation 98%. Weight 211 pounds. GENERAL:  He is a pleasant 30-year-old gentleman. Denied pain. He was oriented to person, place and time. Answered questions appropriately. SKIN:  No unusual skin changes. HEENT:  The pupils are equally round and intact. Mucous membranes were dry. NECK:  No JVD. Good carotid pulses. No carotid bruits. No lymphadenopathy or thyromegaly. CARDIOVASCULAR EXAM:  S1 and S2 were normal.  No S3 or S4. Soft systolic blowing type murmur. No diastolic murmur. PMI was normal.  No lift, thrust, or pericardial friction rub. LUNGS:  Quite clear to auscultation and percussion. ABDOMEN:  Soft and nontender. Good bowel sounds. EXTREMITIES:  Good femoral pulses. Good pedal pulses. No pedal edema. Skin was warm and dry. No calf tenderness. Nail beds pink. Good cap refill. PULSES:  Bilateral symmetrical radial, brachial and carotid pulses. No carotid bruits. Good femoral and pedal pulses. NEUROLOGIC EXAM:  Within normal limits. PSYCHIATRIC EXAM:  Within normal limits. LABORATORY DATA:  His sodium was 139, potassium 4.3, BUN 17, creatinine 0.90. GFR greater than 60. Magnesium was 2.4. Calcium was 9.4. Cholesterol 276, HDL was 40, , triglycerides 347. ALT was 16, AST was 18.   TSH was 0.80.  Vitamin D 69.2. White count 5.9, hemoglobin 17.0 with a platelet count 215,469. EKG showed sinus rhythm with nonspecific ST changes with no change from previous EKGs. Chest x-ray was unremarkable. IMPRESSION:  1. Severe coronary artery disease. 2.  Abnormal stress test with a catheterization on 05/05/2015 showing 90% proximal LAD, 80% PDA, which arose from the circumflex, 80% small right coronary artery, EF of 50%. 3.  Severe peripheral vascular disease with 85% disease in the right internal carotid artery, 50% to 60% disease in his left internal carotid artery. 4.  Right carotid endarterectomy by Dr. Alberto Alba on 06/09/2015.  5.  Open heart surgery by Dr. Jalen Rosario on 06/10/2015, with LIMA to the LAD, vein graft to the PDA, which arose from the circumflex. 6.  Severe hypertension. 7.  Severe hyperlipidemia, untreated, as he does not wish to take statins. 8.  Idiopathic cardiomyopathy in 01/2018 with an EF at 20% to 25%, which has improved to 40% to 45%. 9.  Catheterization on 01/24/2018 showed patent LIMA to the LAD, occluded vein graft to the PDA, which arose from the circumflex, occluded native PDA, with 50% to 55% at the ostium of the circumflex, small nondominant right coronary artery, EF of 30%. 10.  EF in the 40% to 45% range at this time by a bedside echocardiogram.  11.  Asymptomatic at this time. PLAN:  1. No change in medications. 2.  See in 1 year. DISCUSSION:  Mr. Kate House overall is doing well. He has had no chest pain or chest discomfort or any unusual shortness of breath. He keeps his weight very good and his blood pressure has been in a very nice range. His lipids are untreated by his desire. He did ask about statins and did relay that his son, who is a medic, stated that people do worse on statins and that statins are extremely dangerous and people have more medications after doing statins including more heart attacks.     He does choose to go with his son's advice. .. I enjoy seeing Racquel Plenty very much. I will see him in 1 year. Obviously, his risk of progression of his coronary artery disease is extremely high with his lipids untreated. Thank you very much for allowing me the privilege of seeing Mr. Ventura. If you have any questions on my thoughts, please do not hesitate to contact me.     Sincerely,        Clarence Simpson    D: 10/11/2021 10:36:10     T: 10/11/2021 10:40:20     [email or web address]  Job#: [id number]   Doc#: [id number]

## 2021-10-11 NOTE — PROGRESS NOTES
Ov Dr Martha Finley 1 year follow up   No chest pain or sob  No hospitalizations or procedures  Since seen. Has restraining order against Jax. Saw him anyway thought he looked  Better. Fractured leg jumping over wall   Running from the police. Band started practicing yesterday. Bedside echo done.   Cont to refuse statin  Will see in 1 year

## 2021-10-12 NOTE — PROGRESS NOTES
Sonal Wheeler MD  [facility]  [address], [facility]   [phone]      2021      Maria De Jesus Oscar MD  [address] , [facility] [address]      RE:   Fabian Alcantar  :  1950      Dear Dr. La Tsai:    CHIEF COMPLAINT:  1. Coronary artery disease. 2.  Status post open heart surgery by Dr. Ellen Huerta on 06/10/2015. HISTORY OF PRESENT ILLNESS:  I had the pleasure of seeing Mr. Ventura in the office on 10/11/2021. He is a pleasant 66-year-old gentleman who I met on 2015 for cardiac clearance for left knee replacement. He had never been on medications. He was very hypertensive. He has a strong family history of coronary artery disease and he has marked hyperlipidemia. He had a stress test on 2015, which was markedly abnormal.  This resulted in a cardiac catheterization on 2015 that showed 90% LAD in the proximal portion, 80% circumflex, 80% small right coronary artery, EF 50%. He had critical right carotid artery disease and had a right carotid endarterectomy by Dr. Vivek Gaines on 2015. On 06/10/2015, he had open heart surgery by Dr. Ellen Huerta with a LIMA to the LAD and a vein graft to the PDA. He had subsequent left meniscus surgery by Dr. Dick Carrera on 10/15/2015, from which he made a good recovery. He had flu syndrome in 2017 and came to the emergency room on 2018. He had a right lower lobe pneumonia. CT scan was negative for PE and he was hospitalized for pneumonia. Echocardiogram on 2018 showed an EF of 25% with moderate-to-severe mitral regurgitation. Repeat catheterization on 2018 showed patent LIMA to the LAD, circumflex had 50% disease in the ostium with an FFR of 0.98, PDA was occluded, which arose from the circumflex, the vein graft to the PDA was occluded, the right coronary artery was small and nondominant, EF was 30% to 35%.     We placed him in cardiac rehab and medical therapy. He did develop atrial fibrillation on 01/05/2018, with a heart rate of 120 to 130 and was placed on Xarelto. He converted to sinus rhythm and is currently on Coumadin. He has severe hyperlipidemia with an LDL in the 160 to 170 range and will not take any statins as it \"almost killed him. \"    He continues to do well. He has had no hospitalizations or procedures. He also has a fair amount of stress in his life with his son, Isabel Hull. 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 He continues to work out. He has had no syncope or near syncope, lightheadedness or dizziness. He really has no complaints as I see him today. CARDIAC RISK FACTORS:  Known CAD:  Positive. Peripheral Vascular Disease:  Positive. Hyperlipidemia:  Positive. Other Family Members:  Positive. Diabetes:  Negative. Smoking:  Negative. MEDICATIONS AT THIS TIME:  Aspirin 81 mg daily, Coreg 6.25 mg b.i.d., vitamin D 2000 units daily, fish oil daily, Lasix 20 mg t.i.d., Apresoline 25 mg half a tablet every 8 hours, Imdur 30 mg daily, potassium 120 mg b.i.d., Coumadin as directed. PAST MEDICAL HISTORY AND SURGICAL HISTORY:  1. Cardiac as above. 2.  Hypertension. 3.  Rotator cuff on the right side many years ago. 4.  Left knee meniscal surgery by Dr. Sandy Jimenez on 10/15/2015.  5.  Catheterization on 05/05/2015, with right carotid endarterectomy on 06/09/2015. 6.  Bypass surgery on 06/10/2015.  7.  Repeat catheterization on 01/24/2018.  8.  Broken ankle many years ago. 9.  Severe hyperlipidemia, untreated. FAMILY HISTORY:  Significant for CAD. Mother had an MI. Father had an MI. Four brothers and two sisters, alive and well. SOCIAL HISTORY:  He is 79years old, . Three children, the youngest son, Isabel Hull, is 25years old. Muriel Flores has a restraining order against him. His daughter, 40, disabled . Son, 39, is a medic. Muriel Flores has been  for 15 years. He has a girlfriend. 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 REVIEW OF SYSTEMS:  Cardiac as above. Other systems reviewed including constitutional, eyes, ears, nose and throat, cardiovascular, respiratory, GI, , musculoskeletal, integumentary, neurologic, endocrine, hematologic and allergic/immunologic are negative except for what is described above. No weight loss or weight gain. No change in bowel habits. No blood in stools. No fevers, sweats or chills. PHYSICAL EXAMINATION:  VITAL SIGNS:  His blood pressure was 120/80 with a heart rate of 68 and regular. Respiratory rate 18. O2 saturation 98%. Weight 211 pounds. GENERAL:  He is a pleasant 25-year-old gentleman. Denied pain. He was oriented to person, place and time. Answered questions appropriately. SKIN:  No unusual skin changes. HEENT:  The pupils are equally round and intact. Mucous membranes were dry. NECK:  No JVD. Good carotid pulses. No carotid bruits. No lymphadenopathy or thyromegaly. CARDIOVASCULAR EXAM:  S1 and S2 were normal.  No S3 or S4. Soft systolic blowing type murmur. No diastolic murmur. PMI was normal.  No lift, thrust, or pericardial friction rub. LUNGS:  Quite clear to auscultation and percussion. ABDOMEN:  Soft and nontender. Good bowel sounds. EXTREMITIES:  Good femoral pulses. Good pedal pulses. No pedal edema. Skin was warm and dry. No calf tenderness. Nail beds pink. Good cap refill. PULSES:  Bilateral symmetrical radial, brachial and carotid pulses. No carotid bruits. Good femoral and pedal pulses. NEUROLOGIC EXAM:  Within normal limits. PSYCHIATRIC EXAM:  Within normal limits. LABORATORY DATA:  His sodium was 139, potassium 4.3, BUN 17, creatinine 0.90. GFR greater than 60. Magnesium was 2.4. Calcium was 9.4. Cholesterol 276, HDL was 40, , triglycerides 347. ALT was 16, AST was 18.   TSH was 0.80.  Vitamin D 69.2. White count 5.9, hemoglobin 17.0 with a platelet count 052,854. EKG showed sinus rhythm with nonspecific ST changes with no change from previous EKGs. Chest x-ray was unremarkable. IMPRESSION:  1. Severe coronary artery disease. 2.  Abnormal stress test with a catheterization on 05/05/2015 showing 90% proximal LAD, 80% PDA, which arose from the circumflex, 80% small right coronary artery, EF of 50%. 3.  Severe peripheral vascular disease with 85% disease in the right internal carotid artery, 50% to 60% disease in his left internal carotid artery. 4.  Right carotid endarterectomy by Dr. Berhane Chery on 06/09/2015.  5.  Open heart surgery by Dr. aDniella Tan on 06/10/2015, with LIMA to the LAD, vein graft to the PDA, which arose from the circumflex. 6.  Severe hypertension. 7.  Severe hyperlipidemia, untreated, as he does not wish to take statins. 8.  Idiopathic cardiomyopathy in 01/2018 with an EF at 20% to 25%, which has improved to 40% to 45%. 9.  Catheterization on 01/24/2018 showed patent LIMA to the LAD, occluded vein graft to the PDA, which arose from the circumflex, occluded native PDA, with 50% to 55% at the ostium of the circumflex, small nondominant right coronary artery, EF of 30%. 10.  EF in the 40% to 45% range at this time by a bedside echocardiogram.  11.  Asymptomatic at this time. PLAN:  1. No change in medications. 2.  See in 1 year. DISCUSSION:  Mr. Deepthi Montejo overall is doing well. He has had no chest pain or chest discomfort or any unusual shortness of breath. He keeps his weight very good and his blood pressure has been in a very nice range. His lipids are untreated by his desire. He did ask about statins and did relay that his son, who is a medic, stated that people do worse on statins and that statins are extremely dangerous and people have more medications after doing statins including more heart attacks.     He does choose to go with his son's advice. .. I enjoy seeing Gianna Cornejo very much. I will see him in 1 year. Obviously, his risk of progression of his coronary artery disease is extremely high with his lipids untreated. Thank you very much for allowing me the privilege of seeing Mr. Ventura. If you have any questions on my thoughts, please do not hesitate to contact me.     Sincerely,        Virgilio Dutta    D: 10/11/2021 10:36:10     T: 10/11/2021 10:40:20     [email or web address]  Job#: [id number]   Doc#: [id number]

## 2021-11-03 ENCOUNTER — HOSPITAL ENCOUNTER (OUTPATIENT)
Dept: PHARMACY | Age: 71
Setting detail: THERAPIES SERIES
Discharge: HOME | End: 2021-11-03
Payer: MEDICARE

## 2021-11-03 VITALS — WEIGHT: 211.2 LBS | BODY MASS INDEX: 26.4 KG/M2

## 2021-11-03 LAB — INR BLD: 2.4

## 2021-11-03 PROCEDURE — 85610 PROTHROMBIN TIME: CPT | Performed by: FAMILY MEDICINE

## 2021-11-03 PROCEDURE — 99211 OFF/OP EST MAY X REQ PHY/QHP: CPT | Performed by: FAMILY MEDICINE

## 2021-11-03 RX ORDER — IBUPROFEN/PSEUDOEPHEDRINE HCL 200MG-30MG
1 TABLET ORAL NIGHTLY PRN
COMMUNITY

## 2021-11-03 NOTE — PROGRESS NOTES
[address]/[place]  Medication Management  ANTICOAGULATION    Referring Provider: Dr Chencho Wang INR: 2.0-3.0    TODAY'S INR: 2.4    WARFARIN Dosage: continue 6mg MF, 3mg all other days    INR (no units)   Date Value   2021 2.4   2021 3.2   08/10/2021 2.3   2021 2.3   2021 3.4   2021 1.5   2020 2.3       Medication changes:  No changes  Added melatonin to med list-patient has been taking for a long time. Dose varies depending on \"what I  when at the store\"  Notes:    Fingerstick INR drawn per clinic protocol. Patient states no visible blood in urine and no black tarry stool. Denies any missed doses of warfarin. No change in other maintenance medications or in diet. Will recheck INR in 6 weeks. Patient acknowledges working in consult agreement with pharmacist as referred by his/her physician.                   For Pharmacy Admin Tracking Only     Intervention Detail: Adherence Monitorin   Total # of Interventions Recommended: 2   Total # of Interventions Accepted: 2   Time Spent (min): 30    Humera Alicia R.Ph., 11/3/2021,9:23 AM

## 2021-12-13 ENCOUNTER — TELEPHONE (OUTPATIENT)
Dept: FAMILY MEDICINE CLINIC | Age: 71
End: 2021-12-13

## 2021-12-13 ENCOUNTER — HOSPITAL ENCOUNTER (OUTPATIENT)
Dept: PREADMISSION TESTING | Age: 71
Setting detail: SPECIMEN
Discharge: HOME | End: 2021-12-13
Payer: MEDICARE

## 2021-12-13 DIAGNOSIS — Z20.822 EXPOSURE TO COVID-19 VIRUS: ICD-10-CM

## 2021-12-13 DIAGNOSIS — Z20.822 EXPOSURE TO COVID-19 VIRUS: Primary | ICD-10-CM

## 2021-12-13 PROCEDURE — C9803 HOPD COVID-19 SPEC COLLECT: HCPCS

## 2021-12-13 PROCEDURE — U0003 INFECTIOUS AGENT DETECTION BY NUCLEIC ACID (DNA OR RNA); SEVERE ACUTE RESPIRATORY SYNDROME CORONAVIRUS 2 (SARS-COV-2) (CORONAVIRUS DISEASE [COVID-19]), AMPLIFIED PROBE TECHNIQUE, MAKING USE OF HIGH THROUGHPUT TECHNOLOGIES AS DESCRIBED BY CMS-2020-01-R: HCPCS

## 2021-12-13 PROCEDURE — U0005 INFEC AGEN DETEC AMPLI PROBE: HCPCS

## 2021-12-13 NOTE — TELEPHONE ENCOUNTER
Bella Kelley is not currently having any symptoms at the moment. He does want to know if he had COVID. He said last week he was around someone who had it and he felt like he was coming down from having the flu. No fever but did sleep almost 24 hours and just feel drained. Can he get a test? Please let Bella Kelley know.     Health Maintenance   Topic Date Due    Hepatitis C screen  Never done    COVID-19 Vaccine (1) Never done    DTaP/Tdap/Td vaccine (1 - Tdap) Never done    Shingles Vaccine (1 of 2) Never done    Pneumococcal 65+ years Vaccine (1 of 1 - PPSV23) Never done    Colon Cancer Screen FIT/FOBT  04/24/2019    Annual Wellness Visit (AWV)  Never done    Flu vaccine (1) Never done    Potassium monitoring  10/10/2022    Creatinine monitoring  10/10/2022    Lipid screen  10/10/2026    Hepatitis A vaccine  Aged Out    Hepatitis B vaccine  Aged Out    Hib vaccine  Aged Out    Meningococcal (ACWY) vaccine  Aged Out             (applicable per patient's age: Cancer Screenings, Depression Screening, Fall Risk Screening, Immunizations)    LDL Cholesterol (mg/dL)   Date Value   10/10/2021 167 (H)     AST (U/L)   Date Value   10/10/2021 18     ALT (U/L)   Date Value   10/10/2021 16     BUN (mg/dL)   Date Value   10/10/2021 17      (goal A1C is < 7)   (goal LDL is <100) need 30-50% reduction from baseline     BP Readings from Last 3 Encounters:   10/11/21 120/80   09/21/21 (!) 145/101   08/10/21 135/72    (goal /80)      All Future Testing planned in [facility]:  Lab Frequency Next Occurrence   TSH with Reflex Once 09/11/2022   CBC Auto Differential Once 09/11/2022   Comprehensive Metabolic Panel Once 76/47/6374   Vitamin D 25 Hydroxy Once 09/11/2022   Lipid Panel Once 09/11/2022   Magnesium Once 09/11/2022   EKG 12 Lead Once 09/11/2022   XR CHEST (2 VW) Once 09/11/2022       Next Visit Date:  Future Appointments   Date Time Provider Ronaldo Henry   12/15/2021  9:00 AM KRISTI MEDICATION [facility] Jessi Josue 10/4/2022 10:00 AM MD Khai Linares [facility]            Patient Active Problem List:     Coronary artery disease due to lipid rich plaque     S/P CABG x 2     H/O carotid endarterectomy     Hyperlipidemia     Vitamin D deficiency     Essential hypertension     Carotid stenosis     Acute systolic congestive heart failure (HCC)     CHF (congestive heart failure), NYHA class IV, acute, systolic (HCC)     Cardiomyopathy (HCC)     Black stools

## 2021-12-14 LAB
SARS-COV-2: ABNORMAL
SARS-COV-2: DETECTED
SOURCE: ABNORMAL

## 2022-01-19 ENCOUNTER — HOSPITAL ENCOUNTER (OUTPATIENT)
Dept: PHARMACY | Age: 72
Setting detail: THERAPIES SERIES
Discharge: HOME | End: 2022-01-19
Payer: MEDICARE

## 2022-01-19 VITALS
HEART RATE: 88 BPM | BODY MASS INDEX: 25.87 KG/M2 | SYSTOLIC BLOOD PRESSURE: 114 MMHG | WEIGHT: 207 LBS | DIASTOLIC BLOOD PRESSURE: 83 MMHG

## 2022-01-19 LAB — INR BLD: 6

## 2022-01-19 PROCEDURE — 99212 OFFICE O/P EST SF 10 MIN: CPT

## 2022-01-19 PROCEDURE — 85610 PROTHROMBIN TIME: CPT

## 2022-01-19 RX ORDER — ECHINACEA 500 MG
500 CAPSULE ORAL DAILY
COMMUNITY

## 2022-01-19 RX ORDER — GARLIC 180 MG
250 TABLET, DELAYED RELEASE (ENTERIC COATED) ORAL DAILY
COMMUNITY

## 2022-01-19 RX ORDER — PARSLEY 450 MG
500 CAPSULE ORAL DAILY
COMMUNITY

## 2022-01-19 NOTE — PROGRESS NOTES
[facility]  [facility]    Referring Provider: Dr Gelacio Ambrose     GOAL INR: 2.0-3.0     TODAY'S INR: 6.0     WARFARIN Dosage: Hold warfarin for 3 days then decrease warfarin to whole 6 mg tablet on  and half tablet for 3 mg all other days. INR (no units)   Date Value   2021 2.4   2021 3.2   08/10/2021 2.3   2021 2.3   2021 3.4   2021 1.5   2020 2.3     Medication changes:  Echinacea  Sulema Root  Ginseng  Ashwagandha    Notes:    Fingerstick INR drawn per clinic protocol. Patient states no visible blood in urine and no black tarry stool. Denies any missed doses of warfarin. No change in other maintenance medications or in diet. Will recheck INR in 1.5 weeks. Patient acknowledges working in consult agreement with pharmacist as referred by his/her physician.   Patient has been eating less greens and staying busy working with fighters etc.           For Pharmacy [id number]     Intervention Detail: Adherence Monitorin, Dose Adjustment: 4, reason: Therapy Optimization and New Rx: 4, reason: Patient Preference   Total # of Interventions Recommended: 8   Total # of Interventions Accepted: 8   Time Spent (min): [address] [facility], [address] [place] [address], PharmD

## 2022-01-19 NOTE — PATIENT INSTRUCTIONS
Continue to monitor urine and stool. Continue to monitor for signs of bleeding. Return to clinic in 1 week. Hold warfarin for 3 days then decrease warfarin to whole 6 mg tablet on Mondays and half tablet for 3 mg all other days.

## 2022-01-28 ENCOUNTER — HOSPITAL ENCOUNTER (OUTPATIENT)
Dept: PHARMACY | Age: 72
Setting detail: THERAPIES SERIES
Discharge: HOME | End: 2022-01-28
Payer: MEDICARE

## 2022-01-28 VITALS
WEIGHT: 207.6 LBS | BODY MASS INDEX: 25.95 KG/M2 | HEART RATE: 68 BPM | SYSTOLIC BLOOD PRESSURE: 144 MMHG | DIASTOLIC BLOOD PRESSURE: 108 MMHG

## 2022-01-28 LAB — INR BLD: 2.2

## 2022-01-28 PROCEDURE — 99211 OFF/OP EST MAY X REQ PHY/QHP: CPT

## 2022-01-28 PROCEDURE — 85610 PROTHROMBIN TIME: CPT

## 2022-01-28 NOTE — PROGRESS NOTES
[facility]  Medication Management  ANTICOAGULATION    Referring Provider: Dr Steiner     GOAL INR: 2.0-3.0     TODAY'S INR: 2.2     WARFARIN Dosage: 6 mg tablet Mon/Fri and half tablet for 3 mg all other days    INR (no units)   Date Value   2022 2.2   2022 6   2021 2.4   2021 3.2   08/10/2021 2.3   2021 2.3   2021 3.4       Medication changes:  None    Notes:    Fingerstick INR drawn per clinic protocol. Patient states no visible blood in urine and no black tarry stool. Following his INR of 6.0 on 2022, Junito Flores says had been instructed to skip his warfarin for 3 days and then decrease weekly warfarin regimen to 6 mg on  and 3 mg all other days of the week. He tells me today that he \"didn't skip\" those doses but did decrease his warfarin regimen as instructed and \"got his diet back on track\". He says he had been eating more \"greens\" prior to having COVID and had gotten away from that, but feels he is back on track again and his diet and activity level are \"more normal\" now. All other medications reviewed for accuracy. He says he is still taking Ashwagandha, Echinacea, Suelma root, and Ginseng, but is \"more sporadic\" with his Fish Oil, Flaxseed Oil, and Vitamin C. Also, he still takes red yeast rice, vitamin E, and multivitamin daily as he had been previously. No change in other Rx maintenance medications. Since his INR is therapeutic today on the lower end of the therapeutic range, we will have him resume previously stable weekly warfarin regimen as noted on his dosing calendar. He will take 6 mg warfarin on  and  and 3 mg warfarin all other days of the week and we will recheck INR in 4 weeks. Patient acknowledges working in consult agreement with pharmacist as referred by his/her physician.                   For Pharmacy Admin Tracking Only     Intervention Detail: Adherence Monitorin and Dose Adjustment: 1, reason: Therapy Optimization   Total # of Interventions Recommended: 2   Total # of Interventions Accepted: 2   Time Spent (min): [address] Martha Calderón, [facility], PharmD

## 2022-02-25 ENCOUNTER — HOSPITAL ENCOUNTER (OUTPATIENT)
Dept: PHARMACY | Age: 72
Setting detail: THERAPIES SERIES
Discharge: HOME | End: 2022-02-25
Payer: MEDICARE

## 2022-02-25 VITALS
SYSTOLIC BLOOD PRESSURE: 105 MMHG | DIASTOLIC BLOOD PRESSURE: 69 MMHG | HEART RATE: 70 BPM | WEIGHT: 208.6 LBS | BODY MASS INDEX: 26.07 KG/M2

## 2022-02-25 LAB — INR BLD: 3.1

## 2022-02-25 PROCEDURE — 99211 OFF/OP EST MAY X REQ PHY/QHP: CPT

## 2022-02-25 PROCEDURE — 85610 PROTHROMBIN TIME: CPT

## 2022-02-25 NOTE — PROGRESS NOTES
[place] [address] [place] [address]/[place]  Medication Management  ANTICOAGULATION    Referring Provider: Dr Steiner     GOAL INR: 2.0-3.0     TODAY'S INR: 3.1     WARFARIN Dosage: 6 mg tablet Mon/Fri and half tablet for 3 mg all other days    INR (no units)   Date Value   2022 3.1   2022 2.2   2022 6   2021 2.4   2021 3.2   08/10/2021 2.3   2021 2.3       Medication changes:  None    Notes:    Fingerstick INR drawn per clinic protocol. Patient states no visible blood in urine and no black tarry stool. Denies any missed doses of warfarin. No change in other maintenance medications or in diet. Will recheck INR in 6 weeks. Patient acknowledges working in consult agreement with pharmacist as referred by his/her physician.                   For Pharmacy Admin Tracking Only     Intervention Detail: Adherence Monitorin   Total # of Interventions Recommended: 1   Total # of Interventions Accepted: 1   Time Spent (min): [address] [place] [address], [facility], PharmD

## 2022-04-01 ENCOUNTER — HOSPITAL ENCOUNTER (OUTPATIENT)
Dept: PHARMACY | Age: 72
Setting detail: THERAPIES SERIES
Discharge: HOME | End: 2022-04-01
Payer: MEDICARE

## 2022-04-01 VITALS
HEART RATE: 71 BPM | WEIGHT: 212 LBS | SYSTOLIC BLOOD PRESSURE: 96 MMHG | DIASTOLIC BLOOD PRESSURE: 65 MMHG | BODY MASS INDEX: 26.5 KG/M2

## 2022-04-01 DIAGNOSIS — I48.91 ATRIAL FIBRILLATION, UNSPECIFIED TYPE (HCC): Primary | ICD-10-CM

## 2022-04-01 LAB — INR BLD: 2.4

## 2022-04-01 PROCEDURE — 85610 PROTHROMBIN TIME: CPT

## 2022-04-01 PROCEDURE — 99211 OFF/OP EST MAY X REQ PHY/QHP: CPT

## 2022-04-01 NOTE — PATIENT INSTRUCTIONS
Continue to monitor urine and stool. Continue to monitor for signs of bleeding. Return to clinic in 6 weeks. Continue warfarin whole 6 mg tablet Mon,Fri and half tablet for 3 mg all other days.

## 2022-04-01 NOTE — PROGRESS NOTES
[place] [address] [place] [address]/[place]  Medication Management  ANTICOAGULATION    Referring Provider: Dr Steiner     GOAL INR: 2.0-3.0     TODAY'S INR: 2.4     WARFARIN Dosage: Continue warfarin whole 6 mg tablet Mon,Fri and half tablet for 3 mg all other days. INR (no units)   Date Value   2022 3.1   2022 2.2   2022 6   2021 2.4   2021 3.2   08/10/2021 2.3   2021 2.3     Medication changes:  None    Notes:    Fingerstick INR drawn per clinic protocol. Patient states no visible blood in urine and no black tarry stool. Denies any missed doses of warfarin. No change in other maintenance medications or in diet. Will recheck INR in 6 weeks. Patient acknowledges working in consult agreement with pharmacist as referred by his/her physician.                   For Pharmacy Admin Tracking Only     Intervention Detail: Adherence Monitorin   Total # of Interventions Recommended: 0   Total # of Interventions Accepted: 0   Time Spent (min):  Conner Jacobson [facility], PharmD

## 2022-06-09 ENCOUNTER — HOSPITAL ENCOUNTER (OUTPATIENT)
Dept: PHARMACY | Age: 72
Setting detail: THERAPIES SERIES
Discharge: HOME | End: 2022-06-09
Payer: MEDICARE

## 2022-06-09 VITALS — DIASTOLIC BLOOD PRESSURE: 93 MMHG | SYSTOLIC BLOOD PRESSURE: 144 MMHG | HEART RATE: 97 BPM

## 2022-06-09 DIAGNOSIS — I48.91 ATRIAL FIBRILLATION, UNSPECIFIED TYPE (HCC): Primary | ICD-10-CM

## 2022-06-09 LAB — INR BLD: 3.3

## 2022-06-09 PROCEDURE — 85610 PROTHROMBIN TIME: CPT

## 2022-06-09 PROCEDURE — 99211 OFF/OP EST MAY X REQ PHY/QHP: CPT

## 2022-06-09 NOTE — PROGRESS NOTES
[place] [address] [place] [address]/[place]uan  Medication Management  ANTICOAGULATION    Referring Provider: Dr Steiner     GOAL INR: 2.0-3.0     TODAY'S INR: 3.3     WARFARIN Dosage: Continue warfarin whole 6 mg tablet Mon,Fri and half tablet for 3 mg all other days    INR (no units)   Date Value   2022 3.3   2022 2.4   2022 3.1   2022 2.2   2022 6   2021 2.4   2021 3.2   08/10/2021 2.3       Medication changes:  None    Notes:    Fingerstick INR drawn per clinic protocol. Patient states no visible blood in urine and no black tarry stool. Denies any missed doses of warfarin. No change in other maintenance medications or in diet. Will recheck INR in 6 weeks. Patient acknowledges working in consult agreement with pharmacist as referred by his/her physician.                   For Pharmacy Admin Tracking Only     Intervention Detail: Adherence Monitorin   Total # of Interventions Recommended: 1   Total # of Interventions Accepted: 1   Time Spent (min): [address] [place] [address], [facility], PharmD

## 2022-07-25 RX ORDER — POTASSIUM CHLORIDE 750 MG/1
TABLET, EXTENDED RELEASE ORAL
Qty: 180 TABLET | Refills: 3 | Status: SHIPPED | OUTPATIENT
Start: 2022-07-25

## 2022-07-25 RX ORDER — FUROSEMIDE 20 MG/1
20 TABLET ORAL 3 TIMES DAILY
Qty: 90 TABLET | Refills: 3 | Status: SHIPPED | OUTPATIENT
Start: 2022-07-25

## 2022-07-25 RX ORDER — WARFARIN SODIUM 6 MG/1
TABLET ORAL
Qty: 90 TABLET | Refills: 3 | Status: SHIPPED | OUTPATIENT
Start: 2022-07-25 | End: 2022-09-16 | Stop reason: DRUGHIGH

## 2022-07-25 RX ORDER — HYDRALAZINE HYDROCHLORIDE 25 MG/1
TABLET, FILM COATED ORAL
Qty: 135 TABLET | Refills: 3 | Status: SHIPPED | OUTPATIENT
Start: 2022-07-25

## 2022-07-25 RX ORDER — CARVEDILOL 6.25 MG/1
TABLET ORAL
Qty: 180 TABLET | Refills: 3 | Status: SHIPPED | OUTPATIENT
Start: 2022-07-25

## 2022-07-26 ENCOUNTER — HOSPITAL ENCOUNTER (OUTPATIENT)
Dept: PHARMACY | Age: 72
Setting detail: THERAPIES SERIES
Discharge: HOME | End: 2022-07-26
Payer: MEDICARE

## 2022-07-26 VITALS
HEART RATE: 85 BPM | SYSTOLIC BLOOD PRESSURE: 96 MMHG | DIASTOLIC BLOOD PRESSURE: 66 MMHG | WEIGHT: 209.6 LBS | BODY MASS INDEX: 26.2 KG/M2

## 2022-07-26 DIAGNOSIS — I48.91 ATRIAL FIBRILLATION, UNSPECIFIED TYPE (HCC): Primary | ICD-10-CM

## 2022-07-26 LAB — INR BLD: 1.5

## 2022-07-26 PROCEDURE — 85610 PROTHROMBIN TIME: CPT

## 2022-07-26 PROCEDURE — 99211 OFF/OP EST MAY X REQ PHY/QHP: CPT

## 2022-07-26 NOTE — PROGRESS NOTES
[place]/[place]  Medication Management  ANTICOAGULATION    Referring Provider: Dr Karolyn Samson INR: 2.0-3.0     TODAY'S INR: 1.5     WARFARIN Dosage: 6 mg warfarin x 1 booster dose, then resume 6 mg Mon/Fri and 3 mg all other days of the week     INR (no units)   Date Value   2022 1.5   2022 3.3   2022 2.4   2022 3.1   2022 2.2   2022 6   2021 2.4       Medication changes:  None    Notes:    Fingerstick INR drawn per clinic protocol. Patient states no visible blood in urine and no black tarry stool. Siddhartha Ruby says that he missed 1 or \"possibly 2\" doses of warfarin in the past 1 to 2 weeks because \"things have been so busy\". This likely explains his sub-therapeutic INR today. No change in other maintenance medications or in diet. For now, we will will have him take 6 mg warfarin tonight x 1 booster dose, but then resume current weekly warfarin regimen thereafter as noted on his dosing calendar. We will recheck INR in 4 weeks. Patient acknowledges working in consult agreement with pharmacist as referred by his/her physician.                   For Pharmacy Admin Tracking Only    Intervention Detail: Adherence Monitorin and Dose Adjustment: 1, reason: Therapy Optimization  Total # of Interventions Recommended: 2  Total # of Interventions Accepted: 2  Time Spent (min): [address], PharmD

## 2022-08-30 ENCOUNTER — HOSPITAL ENCOUNTER (OUTPATIENT)
Dept: PHARMACY | Age: 72
Setting detail: THERAPIES SERIES
Discharge: HOME | End: 2022-08-30
Payer: MEDICARE

## 2022-08-30 VITALS — WEIGHT: 211.6 LBS | BODY MASS INDEX: 26.45 KG/M2

## 2022-08-30 DIAGNOSIS — I48.91 ATRIAL FIBRILLATION, UNSPECIFIED TYPE (HCC): Primary | ICD-10-CM

## 2022-08-30 LAB — INR BLD: 4.8

## 2022-08-30 PROCEDURE — 85610 PROTHROMBIN TIME: CPT

## 2022-08-30 PROCEDURE — 99211 OFF/OP EST MAY X REQ PHY/QHP: CPT

## 2022-08-30 RX ORDER — VITAMIN B COMPLEX
1 CAPSULE ORAL DAILY
COMMUNITY

## 2022-08-30 NOTE — PROGRESS NOTES
[place] [address] [place] [address]/[place]  Medication Management  ANTICOAGULATION    Referring Provider: Dr Orlando Engel INR: 2.0-3.0     TODAY'S INR: 4.8     WARFARIN Dosage: 3 mg daily      INR (no units)   Date Value   2022 4.8   2022 1.5   2022 3.3   2022 2.4   2022 3.1   2022 2.2   2022 6   2021 2.4       Medication changes:  None    Notes:    Fingerstick INR drawn per clinic protocol. Patient states no visible blood in urine and no black tarry stool. Denies any missed doses of warfarin. Pollo Hernandez says he has been drinking a \"shot of moonshine\" in the evenings to help him sleep and hasn't been taking Melatonin as much as he had been previously. He also says he hasn't been eating very many \"greens\" because he \"roasted a hog\" and has been eating a lot of \"lean meat\" and \"fruit\" recently. All other medications reviewed for accuracy. No change in other maintenance medications. Will decrease current warfarin regimen (22%) to 3 mg daily as noted on his dosing calendar and then recheck INR in 2 weeks. Patient acknowledges working in consult agreement with pharmacist as referred by his/her physician.                   For Pharmacy Admin Tracking Only    Intervention Detail: Adherence Monitorin and Dose Adjustment: 1, reason: Therapy Optimization  Total # of Interventions Recommended: 2  Total # of Interventions Accepted: 2  Time Spent (min): [address], PharmD

## 2022-09-16 ENCOUNTER — HOSPITAL ENCOUNTER (OUTPATIENT)
Dept: PHARMACY | Age: 72
Setting detail: THERAPIES SERIES
Discharge: HOME | End: 2022-09-16
Payer: MEDICARE

## 2022-09-16 VITALS
HEART RATE: 95 BPM | SYSTOLIC BLOOD PRESSURE: 135 MMHG | DIASTOLIC BLOOD PRESSURE: 88 MMHG | BODY MASS INDEX: 25.9 KG/M2 | WEIGHT: 207.2 LBS

## 2022-09-16 DIAGNOSIS — I48.91 ATRIAL FIBRILLATION, UNSPECIFIED TYPE (HCC): Primary | ICD-10-CM

## 2022-09-16 LAB — INR BLD: 2.4

## 2022-09-16 PROCEDURE — 85610 PROTHROMBIN TIME: CPT

## 2022-09-16 PROCEDURE — 99211 OFF/OP EST MAY X REQ PHY/QHP: CPT

## 2022-09-16 RX ORDER — WARFARIN SODIUM 6 MG/1
TABLET ORAL
Qty: 90 TABLET | Refills: 3 | Status: SHIPPED
Start: 2022-09-16

## 2022-09-16 NOTE — PROGRESS NOTES
[place] [address] [place] [address]/J[place]  Medication Management  ANTICOAGULATION    Referring Provider: Dr Orlando Engel INR: 2.0-3.0     TODAY'S INR: 2.4     WARFARIN Dosage: 3 mg daily      INR (no units)   Date Value   2022 2.4   2022 4.8   2022 1.5   2022 3.3   2022 2.4   2022 3.1   2022 2.2   2022 6       Medication changes:  None    Notes:    Fingerstick INR drawn per clinic protocol. Patient states no visible blood in urine and no black tarry stool. Denies any missed doses of warfarin. No change in other maintenance medications or in diet. Will recheck INR in 6 weeks. Patient acknowledges working in consult agreement with pharmacist as referred by his/her physician.                   For Pharmacy Admin Tracking Only    Intervention Detail: Adherence Monitorin  Total # of Interventions Recommended: 1  Total # of Interventions Accepted: 1  Time Spent (min): [address] [place] [address], [address] [place] [address], Tami

## 2022-10-10 DIAGNOSIS — I25.83 CORONARY ARTERY DISEASE DUE TO LIPID RICH PLAQUE: Primary | ICD-10-CM

## 2022-10-10 DIAGNOSIS — R06.02 SOB (SHORTNESS OF BREATH): ICD-10-CM

## 2022-10-10 DIAGNOSIS — I42.8 OTHER CARDIOMYOPATHY (HCC): ICD-10-CM

## 2022-10-10 DIAGNOSIS — I25.10 CORONARY ARTERY DISEASE DUE TO LIPID RICH PLAQUE: Primary | ICD-10-CM

## 2022-10-28 ENCOUNTER — HOSPITAL ENCOUNTER (OUTPATIENT)
Dept: PHARMACY | Age: 72
Setting detail: THERAPIES SERIES
Discharge: HOME | End: 2022-10-28
Payer: MEDICARE

## 2022-10-28 VITALS
SYSTOLIC BLOOD PRESSURE: 137 MMHG | BODY MASS INDEX: 26.22 KG/M2 | HEART RATE: 104 BPM | DIASTOLIC BLOOD PRESSURE: 110 MMHG | WEIGHT: 209.8 LBS

## 2022-10-28 DIAGNOSIS — I48.91 ATRIAL FIBRILLATION, UNSPECIFIED TYPE (HCC): Primary | ICD-10-CM

## 2022-10-28 LAB — INR BLD: 1.7

## 2022-10-28 PROCEDURE — 85610 PROTHROMBIN TIME: CPT

## 2022-10-28 PROCEDURE — 99211 OFF/OP EST MAY X REQ PHY/QHP: CPT

## 2022-10-28 NOTE — PROGRESS NOTES
[place] [address] [place] [address]/[place]  Medication Management  ANTICOAGULATION    Referring Provider: Dr Margarita Kevin INR: 2.0-3.0     TODAY'S INR: 1.7     WARFARIN Dosage: 6 mg x 1, then resume half tablet for 3 mg daily    INR (no units)   Date Value   10/28/2022 1.7   2022 2.4   2022 4.8   2022 1.5   2022 3.3   2022 2.4   2022 3.1       Medication changes:  Stopped Coumadin for 4 days (restarted 10/25)   Took Ibuprofen 800 mg x 1 end of last week    Notes:    Fingerstick INR drawn per clinic protocol. Patient states no visible blood in urine and no black tarry stool. Did stop Coumadin for 4 days (restarted 10/25) due to bleeding from his tongue after biting it and also had a cut on wrist with fairly significant bleeding; Had also taken Ibuprofen 800 mg x 1 on the day he bit his tongue;  Denies any extra doses of warfarin. No change in other maintenance medications or in diet. Discussed dangers of self-dosing and encouraged patient to seek medical treatment next time if unable to get bleeding to stop after 30-60 minutes. Will have him take 6 mg today and resume 3 mg daily. Will recheck INR in 2 weeks. Scheduled for wrist replacement on  so will need to discuss when/if to hold Coumadin and when/if Lovenox will be needed at next appt. Patient acknowledges working in consult agreement with pharmacist as referred by his/her physician.                   For Pharmacy Admin Tracking Only    Intervention Detail: Adherence Monitorin and Dose Adjustment: 1, reason: Therapy Optimization  Total # of Interventions Recommended: 2  Total # of Interventions Accepted: 2  Time Spent (min): Steven Beasley PharmD 10/28/2022 2:01 PM

## 2022-10-31 ENCOUNTER — HOSPITAL ENCOUNTER (OUTPATIENT)
Age: 72
Discharge: HOME | End: 2022-11-02
Payer: MEDICARE

## 2022-10-31 ENCOUNTER — HOSPITAL ENCOUNTER (OUTPATIENT)
Age: 72
Discharge: HOME | End: 2022-10-31
Payer: MEDICARE

## 2022-10-31 ENCOUNTER — HOSPITAL ENCOUNTER (OUTPATIENT)
Dept: NON INVASIVE DIAGNOSTICS | Age: 72
Discharge: HOME | End: 2022-10-31
Payer: MEDICARE

## 2022-10-31 ENCOUNTER — HOSPITAL ENCOUNTER (OUTPATIENT)
Dept: GENERAL RADIOLOGY | Age: 72
Discharge: HOME | End: 2022-11-02
Payer: MEDICARE

## 2022-10-31 DIAGNOSIS — I25.83 CORONARY ARTERY DISEASE DUE TO LIPID RICH PLAQUE: ICD-10-CM

## 2022-10-31 DIAGNOSIS — I10 ESSENTIAL HYPERTENSION: ICD-10-CM

## 2022-10-31 DIAGNOSIS — E78.2 MIXED HYPERLIPIDEMIA: ICD-10-CM

## 2022-10-31 DIAGNOSIS — I50.21 CHF (CONGESTIVE HEART FAILURE), NYHA CLASS IV, ACUTE, SYSTOLIC (HCC): ICD-10-CM

## 2022-10-31 DIAGNOSIS — I25.10 CORONARY ARTERY DISEASE DUE TO LIPID RICH PLAQUE: ICD-10-CM

## 2022-10-31 DIAGNOSIS — R53.83 FATIGUE, UNSPECIFIED TYPE: ICD-10-CM

## 2022-10-31 DIAGNOSIS — Z95.1 S/P CABG X 2: ICD-10-CM

## 2022-10-31 DIAGNOSIS — R06.02 SOB (SHORTNESS OF BREATH): ICD-10-CM

## 2022-10-31 DIAGNOSIS — E55.9 VITAMIN D DEFICIENCY: ICD-10-CM

## 2022-10-31 DIAGNOSIS — I42.8 OTHER CARDIOMYOPATHY (HCC): ICD-10-CM

## 2022-10-31 DIAGNOSIS — I48.0 PAROXYSMAL ATRIAL FIBRILLATION (HCC): ICD-10-CM

## 2022-10-31 DIAGNOSIS — I25.83 CORONARY ARTERY DISEASE DUE TO LIPID RICH PLAQUE: Primary | ICD-10-CM

## 2022-10-31 DIAGNOSIS — I25.10 CORONARY ARTERY DISEASE DUE TO LIPID RICH PLAQUE: Primary | ICD-10-CM

## 2022-10-31 LAB
ABSOLUTE EOS #: 0.2 K/UL (ref 0–0.4)
ABSOLUTE LYMPH #: 1.8 K/UL (ref 1–4.8)
ABSOLUTE MONO #: 0.6 K/UL (ref 0–1)
ALBUMIN SERPL-MCNC: 4.2 G/DL (ref 3.5–5.2)
ALP BLD-CCNC: 61 U/L (ref 40–129)
ALT SERPL-CCNC: 19 U/L (ref 5–41)
ANION GAP SERPL CALCULATED.3IONS-SCNC: 12 MMOL/L (ref 9–17)
AST SERPL-CCNC: 16 U/L
BASOPHILS # BLD: 0 % (ref 0–2)
BASOPHILS ABSOLUTE: 0 K/UL (ref 0–0.2)
BILIRUB SERPL-MCNC: 1 MG/DL (ref 0.3–1.2)
BUN BLDV-MCNC: 20 MG/DL (ref 8–23)
BUN/CREAT BLD: 15 (ref 9–20)
CALCIUM SERPL-MCNC: 9.3 MG/DL (ref 8.6–10.4)
CHLORIDE BLD-SCNC: 106 MMOL/L (ref 98–107)
CHOLESTEROL/HDL RATIO: 6
CHOLESTEROL: 209 MG/DL
CO2: 25 MMOL/L (ref 20–31)
CREAT SERPL-MCNC: 1.32 MG/DL (ref 0.7–1.2)
DIFFERENTIAL TYPE: YES
EOSINOPHILS RELATIVE PERCENT: 3 % (ref 0–5)
GFR SERPL CREATININE-BSD FRML MDRD: 58 ML/MIN/1.73M2
GLUCOSE BLD-MCNC: 103 MG/DL (ref 70–99)
HCT VFR BLD CALC: 45.7 % (ref 41–53)
HDLC SERPL-MCNC: 35 MG/DL
HEMOGLOBIN: 15.8 G/DL (ref 13.5–17.5)
LDL CHOLESTEROL: 125 MG/DL (ref 0–130)
LV EF: 38 %
LVEF MODALITY: NORMAL
LYMPHOCYTES # BLD: 28 % (ref 13–44)
MAGNESIUM: 2 MG/DL (ref 1.6–2.6)
MCH RBC QN AUTO: 32.5 PG (ref 26–34)
MCHC RBC AUTO-ENTMCNC: 34.7 G/DL (ref 31–37)
MCV RBC AUTO: 93.8 FL (ref 80–100)
MONOCYTES # BLD: 10 % (ref 5–9)
PATIENT FASTING?: YES
PDW BLD-RTO: 13.1 % (ref 12.1–15.2)
PLATELET # BLD: 144 K/UL (ref 140–450)
POTASSIUM SERPL-SCNC: 3.9 MMOL/L (ref 3.7–5.3)
RBC # BLD: 4.86 M/UL (ref 4.5–5.9)
SEG NEUTROPHILS: 59 % (ref 39–75)
SEGMENTED NEUTROPHILS ABSOLUTE COUNT: 3.8 K/UL (ref 2.1–6.5)
SODIUM BLD-SCNC: 143 MMOL/L (ref 135–144)
TOTAL PROTEIN: 6.5 G/DL (ref 6.4–8.3)
TRIGL SERPL-MCNC: 244 MG/DL
TSH SERPL DL<=0.05 MIU/L-ACNC: 1.11 UIU/ML (ref 0.3–5)
WBC # BLD: 6.4 K/UL (ref 3.5–11)

## 2022-10-31 PROCEDURE — 80061 LIPID PANEL: CPT

## 2022-10-31 PROCEDURE — 85025 COMPLETE CBC W/AUTO DIFF WBC: CPT

## 2022-10-31 PROCEDURE — 93005 ELECTROCARDIOGRAM TRACING: CPT

## 2022-10-31 PROCEDURE — 71046 X-RAY EXAM CHEST 2 VIEWS: CPT

## 2022-10-31 PROCEDURE — 84443 ASSAY THYROID STIM HORMONE: CPT

## 2022-10-31 PROCEDURE — 93306 TTE W/DOPPLER COMPLETE: CPT

## 2022-10-31 PROCEDURE — 83735 ASSAY OF MAGNESIUM: CPT

## 2022-10-31 PROCEDURE — 80053 COMPREHEN METABOLIC PANEL: CPT

## 2022-10-31 PROCEDURE — 36415 COLL VENOUS BLD VENIPUNCTURE: CPT

## 2022-11-01 LAB
EKG ATRIAL RATE: 214 BPM
EKG Q-T INTERVAL: 424 MS
EKG QRS DURATION: 130 MS
EKG QTC CALCULATION (BAZETT): 504 MS
EKG R AXIS: 15 DEGREES
EKG T AXIS: 0 DEGREES
EKG VENTRICULAR RATE: 85 BPM

## 2022-11-01 PROCEDURE — 93010 ELECTROCARDIOGRAM REPORT: CPT | Performed by: INTERNAL MEDICINE

## 2022-11-10 ENCOUNTER — HOSPITAL ENCOUNTER (OUTPATIENT)
Dept: PHARMACY | Age: 72
Setting detail: THERAPIES SERIES
Discharge: HOME | End: 2022-11-10
Payer: MEDICARE

## 2022-11-10 ENCOUNTER — OFFICE VISIT (OUTPATIENT)
Dept: CARDIOLOGY CLINIC | Age: 72
End: 2022-11-10
Payer: MEDICARE

## 2022-11-10 VITALS
BODY MASS INDEX: 26 KG/M2 | SYSTOLIC BLOOD PRESSURE: 139 MMHG | DIASTOLIC BLOOD PRESSURE: 93 MMHG | HEART RATE: 63 BPM | OXYGEN SATURATION: 98 % | WEIGHT: 208 LBS

## 2022-11-10 DIAGNOSIS — I48.0 PAROXYSMAL ATRIAL FIBRILLATION (HCC): ICD-10-CM

## 2022-11-10 DIAGNOSIS — I48.91 ATRIAL FIBRILLATION, UNSPECIFIED TYPE (HCC): Primary | ICD-10-CM

## 2022-11-10 DIAGNOSIS — I10 ESSENTIAL HYPERTENSION: ICD-10-CM

## 2022-11-10 DIAGNOSIS — Z95.1 S/P CABG X 2: Primary | ICD-10-CM

## 2022-11-10 DIAGNOSIS — I25.83 CORONARY ARTERY DISEASE DUE TO LIPID RICH PLAQUE: ICD-10-CM

## 2022-11-10 DIAGNOSIS — R09.89 BRUIT: ICD-10-CM

## 2022-11-10 DIAGNOSIS — I25.10 CORONARY ARTERY DISEASE DUE TO LIPID RICH PLAQUE: ICD-10-CM

## 2022-11-10 DIAGNOSIS — E78.00 PURE HYPERCHOLESTEROLEMIA: ICD-10-CM

## 2022-11-10 LAB — INR BLD: 3.7

## 2022-11-10 PROCEDURE — 3078F DIAST BP <80 MM HG: CPT | Performed by: INTERNAL MEDICINE

## 2022-11-10 PROCEDURE — 99211 OFF/OP EST MAY X REQ PHY/QHP: CPT

## 2022-11-10 PROCEDURE — 85610 PROTHROMBIN TIME: CPT

## 2022-11-10 PROCEDURE — 1123F ACP DISCUSS/DSCN MKR DOCD: CPT | Performed by: INTERNAL MEDICINE

## 2022-11-10 PROCEDURE — 3074F SYST BP LT 130 MM HG: CPT | Performed by: INTERNAL MEDICINE

## 2022-11-10 PROCEDURE — 99214 OFFICE O/P EST MOD 30 MIN: CPT | Performed by: INTERNAL MEDICINE

## 2022-11-10 NOTE — PROGRESS NOTES
[place] [facility]/[place]  Medication Management  ANTICOAGULATION    Referring Provider: Dr Madhuri Michelle INR: 2.0-3.0     TODAY'S INR: 3.7     WARFARIN Dosage: HOLD x 1, then resume 3 mg daily      INR (no units)   Date Value   11/10/2022 3.7   10/28/2022 1.7   2022 2.4   2022 4.8   2022 1.5   2022 3.3   2022 2.4       Medication changes:  None    Notes:    Fingerstick INR drawn per clinic protocol. Patient states no visible blood in urine and no black tarry stool. Denies any missed doses of warfarin. oB Caba says he was taking OTC Ibuprofen for pain last week, which can increase INR and could be contributing to his supra-therapeutic INR today. No change in other maintenance medications or in diet. He also says he will have surgery on his left wrist per Dr. Adolph Clark on 2022 and was cleared to hold his warfarin for 5 days prior to the procedure per Dr. Kassi Lewis this morning. However, Bo Caba says he only needs to hold his warfarin for \"3 days\" per Dr. Lem Brittle instructions. Since his INR is slightly supra-therapeutic today and he took his warfarin already this morning, we will have him skip his warfarin dose tomorrow morning, but then resume 3 mg warfarin daily as noted on his dosing calendar. We will recheck INR in 4 weeks (2 weeks after his upcoming procedure). Patient acknowledges working in consult agreement with pharmacist as referred by his/her physician.                   For Pharmacy Admin Tracking Only    Intervention Detail: Adherence Monitorin and Dose Adjustment: 1, reason: Therapy Optimization  Total # of Interventions Recommended: 2  Total # of Interventions Accepted: 2  Time Spent (min): [address], [address] [place] [address], PharmD

## 2022-11-10 NOTE — LETTER
Sadia Cantrell MD  [facility]  [facility]  [address] [place] [address]  [phone]      November 10, 2022      [address] Mathew MD  [address]      RE:   Ana Richardson  :        Dear Dr. Francoise Mathew:    CHIEF COMPLAINT:  1. Severe coronary artery disease. 2.  Cleared for left wrist surgery by Dr. Francoise Mathew. HISTORY OF PRESENT ILLNESS:  I had the pleasure of seeing Mr. Ventura in our office on 11/10/2022. He is a pleasant 77-year-old gentleman who I met on 2015, for cardiac clearance for left knee replacement. He had never been on medication and was very hypertensive. He had a strong family history of coronary artery disease and had severe hyperlipidemia. His stress test on 2015, was markedly abnormal, which resulted in a catheterization on 2015, that showed 90% LAD in the proximal portion, 80% circumflex, 80% small right coronary artery disease, EF of 50%. He had critical right carotid artery disease and had a right carotid endarterectomy by Dr. Adeola Townsend on 2015. On 06/10/2015, he had open heart surgery by Dr. Debo Guerrero with a LIMA to the LAD and a vein graft to the PDA, with subsequent left meniscus surgery by Dr. Toula Soulier on 10/15/2015, from which he made a good recovery. He had flu syndrome in 2017 and came to the emergency room on 2018, and he had a right lower lobe pneumonia. Echocardiogram on 2018, showed an EF of 25% with moderate-to-severe mitral regurgitation, and repeat catheterization on 2018, showed patent LIMA to the LAD, circumflex had 50% disease, the PDA was occluded, which arose from the circumflex and the vein graft to the PDA was occluded, the right coronary artery was small and nondominant, EF was 30% to 35%. His EF has remained in the 35% range. He developed atrial fibrillation on 2018, and was placed on Xarelto.   He converted to sinus rhythm and is on Coumadin. However, he has reverted back to atrial fibrillation again at this time. This is totally asymptomatic. He does have hyperlipidemia. He takes red yeast rice and his LDL has been in the 130 range. He refuses any statins or any other medications except for the red yeast rice. He has not had his carotid ultrasound for several years. His last carotid ultrasound at [facility] showed minimum disease in the right internal carotid artery, with 50% to 60% disease in the left internal carotid artery. He cannot exercise by walking since he has a left ankle injury that prevents him from running. 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 Shanda Bray on 11/23. 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 He denies any chest pain. He has been losing weight, which has helped with his walking. He has had no unusual shortness of breath, no loss of energy. He tries to stay active and he still teaches at a gym. Denies any syncope or near syncope. He has re-developed his atrial fibrillation and this is asymptomatic. CARDIAC RISK FACTORS:  Known CAD:  Positive. Peripheral Vascular Disease:  Positive. Hyperlipidemia:  Positive. Other Family Members:  Positive. Diabetes:  Negative. Smoking:  Negative. MEDICATIONS AT THIS TIME:  He is on aspirin 81 mg daily, B complex daily, Coreg 6.25 mg b.i.d., vitamin D 2000 units daily, Lasix 20 mg two in the morning and one in the afternoon, hydralazine 25 mg half a tablet every 8 hours, Imdur 30 mg daily, potassium 10 mEq b.i.d., red yeast rice 600 mg daily, Coumadin as directed. PAST MEDICAL HISTORY AND SURGICAL HISTORY:  1. Cardiac as above. 2.  Hypertension. 3.  Rotator cuff on the right side many years ago.   4.  Left knee meniscal surgery by Dr. Warden Cole on 10/15/2015.  5.  Catheterization on 05/05/2015, with right carotid endarterectomy on 06/09/2015. 6.  Bypass surgery on 06/10/2015.  7.  Catheterization on 01/24/2018.  8.  Broken left ankle many years ago. 9.  Severe hyperlipidemia, treated with red yeast rice by his desire. 10.  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 FAMILY HISTORY:  Significant for CAD. Mother had an MI. Father had an MI.    SOCIAL HISTORY:  He is 67years old, . Three children, the youngest son, Nik Isabel, is 32, he is out of halfway and he has just gotten his licence and has a job interview at [employer]. Daughter, 55, disabled . Son, 37, is a medic. Raina Agudelo has been  for 17 years. He has a gym with several students. He is back with his band. He does not smoke or drink alcohol. REVIEW OF SYSTEMS:  Cardiac as above. Other systems reviewed including constitutional, eyes, ears, nose and throat, cardiovascular, respiratory, GI, , musculoskeletal, integumentary, neurologic, endocrine, hematologic and allergic/immunologic are negative except for what is described above. No weight loss or weight gain. No change in bowel habits. No blood in stools. No fevers, sweats or chills. PHYSICAL EXAMINATION:  VITAL SIGNS:  His blood pressure was 139/93 with a heart rate of 63 and irregular. Respiratory rate 18. O2 saturation 98%. Weight 208 pounds. GENERAL:  He is a pleasant 75-year-old gentleman. Denied pain. He was oriented to person, place and time. Answered questions appropriately. SKIN:  No unusual skin changes. HEENT:  The pupils are equally round and intact. Mucous membranes were dry. NECK:  No JVD. Good carotid pulses. No carotid bruits. No lymphadenopathy or thyromegaly. CARDIOVASCULAR EXAM:  S1 and S2 were normal.  No S3 or S4. Soft systolic blowing type murmur. No diastolic murmur. PMI was normal.  No lift, thrust, or pericardial friction rub. LUNGS:  Clear to auscultation and percussion. ABDOMEN:  Soft and nontender.   Good bowel sounds. EXTREMITIES:  Good femoral pulses. Good pedal pulses. Mild pedal edema. Skin was warm and dry. No calf tenderness. Nail beds pink. Good cap refill. PULSES:  Bilateral symmetrical radial, brachial and carotid pulses. No carotid bruits. Good femoral and pedal pulses. NEUROLOGIC EXAM:  Within normal limits. PSYCHIATRIC EXAM:  Within normal limits. LABORATORY DATA:  His sodium was 143, potassium 3.9, BUN 20, creatinine 1.32. GFR was 58. Glucose 103, calcium was 9.3. Cholesterol 209 with an HDL of 35, , triglycerides 244. ALT was 19, AST was 16. TSH 1.11. White count 6.4, hemoglobin 15.8 with a platelet count 164,520. EKG showed atrial fibrillation with a controlled ventricular response. Chest x-ray was unremarkable. Echocardiogram showed EF of 35% to 40%, with severely enlarged left atrium and right atrium. There was mild mitral regurgitation. IMPRESSION:  1. Cleared for left wrist surgery by Dr. Torri Maldonado on 11/23. 2.  Functional class I at this time. 3.  Atrial fibrillation, which is probably chronic at this time, with him on Coumadin. 4.  Catheterization on 05/05/2015, showed 90% proximal LAD, 80% PDA, which arose from the circumflex, 80% small right coronary artery, EF of 50%. 5.  Severe peripheral vascular disease with 85% disease in the right internal carotid artery, 50% to 60% in the left internal carotid artery. 6.  Right carotid endarterectomy by Dr. Nhung Heaton on 06/09/2015.  7.  Open heart surgery by Dr. Ilene Rosales on 06/10/2015, with a LIMA to the LAD, vein graft to the PDA, which arose from the circumflex. 8.  Severe hypertension, which is under good control. 9.  Severe hyperlipidemia, untreated, as he does not wish to take statins. 10.  Idiopathic cardiomyopathy in 01/2018 with an EF at 20% to 25%, which has improved to 35% to 40%.   11.  Catheterization on 01/24/2018, showed patent LIMA to the LAD, occluded vein graft to the PDA, with an occluded native PDA, with 50% to 55% disease at the ostium of the circumflex, small nondominant right coronary artery. PLAN:  1. Hold Coumadin for 5 days. 2.  Otherwise no special precaution for left wrist surgery. 3.  Restart Coumadin immediately after surgery. 4.  We will see in one year. 5.  I have offered to do carotid ultrasound to recheck his carotid artery, which he does not wish to do at this time. 6.  I have offered other medication for hyperlipidemia, which he did not wish to do. DISCUSSION:  Mr. Gato Cm overall is doing well. He is asymptomatic with no chest pain, shortness of breath or loss of energy to indicate that we need to do any testing such as stress test and unfortunately cannot exercise because of the pain in his left ankle. He is cleared for surgery on his left wrist.    He does have a history of paroxysmal atrial fibrillation, which was first discovered on 01/05/2018. He had reverted to sinus rhythm but is still on Coumadin. He is currently in atrial fibrillation and is most likely chronic at this time. He is totally asymptomatic and I see no reason to try to convert him back to sinus rhythm. I have recommended that we get a carotid ultrasound, which he does not wish to do at this time. There are non-statin drugs now available for effectively treating his hyperlipidemia, which he does not wish to do. I will plan on seeing him in one year unless a problem would develop. I truly enjoy seeing Mr. Ventura. I look forward to seeing him in one year. Thank you very much for allowing me the privilege of seeing Mr. Ventura. If you have any questions on my thoughts, please do not hesitate to contact me.      Sincerely,        Mary Kay Mejia    D: 11/10/2022 9:01:35     T: 11/11/2022 2:34:55     [email or web address]  Job#: [id number]   Doc#: [id number]    CC:  Zulema Palacios

## 2022-11-10 NOTE — LETTER
[address] CARDIOLOGY SPECIALIST  [address], [address]  Dept: [phone]  Dept Fax: [phone]          11/10/22      To Whom it May Concern: In my opinion, from a cardiology standpoint, Elizabeth Madrid is cleared for surgery. Hold coumadin for 5 days prior and can restart after surgery. If you have any questions, please feel free to call me at [phone].     Sincerely,          Elizabeth Madrid MD

## 2022-11-10 NOTE — PROGRESS NOTES
Ov DR Eliud Burkett 1 year follow up  No chest pain or sob. No hospitalizations or procedures  Since seen. Need cardiac clearance for   Lt wrist replacement on 11/23  Per DR Venkata Medina. Losing weight to help walk  Dorys León out of correction living with his mom. Has interview with [employer]. Pt band back together. Pt is cleared for surgery  Can hold coumadin for 5 days   Prior. See in 1 year.

## 2022-11-14 NOTE — PROGRESS NOTES
Bobby Mccarty MD  [facility]  [address] [place] 80 [phone]      November 10, 2022      [address] Mercedes MD  [address]      RE:   Tabitha Rodriguez  :        Dear Dr. Farhad Mercedes:    CHIEF COMPLAINT:  1. Severe coronary artery disease. 2.  Cleared for left wrist surgery by Dr. Farhad Mercedes. HISTORY OF PRESENT ILLNESS:  I had the pleasure of seeing Mr. Ventura in our office on 11/10/2022. He is a pleasant 79-year-old gentleman who I met on 2015, for cardiac clearance for left knee replacement. He had never been on medication and was very hypertensive. He had a strong family history of coronary artery disease and had severe hyperlipidemia. His stress test on 2015, was markedly abnormal, which resulted in a catheterization on 2015, that showed 90% LAD in the proximal portion, 80% circumflex, 80% small right coronary artery disease, EF of 50%. He had critical right carotid artery disease and had a right carotid endarterectomy by Dr. Loan Cole on 2015. On 06/10/2015, he had open heart surgery by Dr. Sanjana Lucia with a LIMA to the LAD and a vein graft to the PDA, with subsequent left meniscus surgery by Dr. Arsh Soliman on 10/15/2015, from which he made a good recovery. He had flu syndrome in 2017 and came to the emergency room on 2018, and he had a right lower lobe pneumonia. Echocardiogram on 2018, showed an EF of 25% with moderate-to-severe mitral regurgitation, and repeat catheterization on 2018, showed patent LIMA to the LAD, circumflex had 50% disease, the PDA was occluded, which arose from the circumflex and the vein graft to the PDA was occluded, the right coronary artery was small and nondominant, EF was 30% to 35%. His EF has remained in the 35% range. He developed atrial fibrillation on 2018, and was placed on Xarelto.   He converted to sinus rhythm and is on Coumadin. However, he has reverted back to atrial fibrillation again at this time. This is totally asymptomatic. He does have hyperlipidemia. He takes red yeast rice and his LDL has been in the 130 range. He refuses any statins or any other medications except for the red yeast rice. He has not had his carotid ultrasound for several years. His last carotid ultrasound at [facility] showed minimum disease in the right internal carotid artery, with 50% to 60% disease in the left internal carotid artery. He cannot exercise by walking since he has a left ankle injury that prevents him from running. 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 Frankey Olea on 11/23. 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 He denies any chest pain. He has been losing weight, which has helped with his walking. He has had no unusual shortness of breath, no loss of energy. He tries to stay active and he still teaches at a gym. Denies any syncope or near syncope. He has re-developed his atrial fibrillation and this is asymptomatic. CARDIAC RISK FACTORS:  Known CAD:  Positive. Peripheral Vascular Disease:  Positive. Hyperlipidemia:  Positive. Other Family Members:  Positive. Diabetes:  Negative. Smoking:  Negative. MEDICATIONS AT THIS TIME:  He is on aspirin 81 mg daily, B complex daily, Coreg 6.25 mg b.i.d., vitamin D 2000 units daily, Lasix 20 mg two in the morning and one in the afternoon, hydralazine 25 mg half a tablet every 8 hours, Imdur 30 mg daily, potassium 10 mEq b.i.d., red yeast rice 600 mg daily, Coumadin as directed. PAST MEDICAL HISTORY AND SURGICAL HISTORY:  1. Cardiac as above. 2.  Hypertension. 3.  Rotator cuff on the right side many years ago.   4.  Left knee meniscal surgery by Dr. Bairon Lindo on 10/15/2015.  5.  Catheterization on 05/05/2015, with right carotid endarterectomy on 06/09/2015. 6.  Bypass surgery on 06/10/2015.  7.  Catheterization on 01/24/2018.  8.  Broken left ankle many years ago. 9.  Severe hyperlipidemia, treated with red yeast rice by his desire. 10.  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 FAMILY HISTORY:  Significant for CAD. Mother had an MI. Father had an MI.    SOCIAL HISTORY:  He is 67years old, . Three children, the youngest son, Judith Mcdonald, is 32, he is out of intermediate and he has just gotten his licence and has a job interview at [employer]. Daughter, 55, disabled . Son, 37, is a medic. Sher Michael has been  for 17 years. He has a gym with several students. He is back with his band. He does not smoke or drink alcohol. REVIEW OF SYSTEMS:  Cardiac as above. Other systems reviewed including constitutional, eyes, ears, nose and throat, cardiovascular, respiratory, GI, , musculoskeletal, integumentary, neurologic, endocrine, hematologic and allergic/immunologic are negative except for what is described above. No weight loss or weight gain. No change in bowel habits. No blood in stools. No fevers, sweats or chills. PHYSICAL EXAMINATION:  VITAL SIGNS:  His blood pressure was 139/93 with a heart rate of 63 and irregular. Respiratory rate 18. O2 saturation 98%. Weight 208 pounds. GENERAL:  He is a pleasant 66-year-old gentleman. Denied pain. He was oriented to person, place and time. Answered questions appropriately. SKIN:  No unusual skin changes. HEENT:  The pupils are equally round and intact. Mucous membranes were dry. NECK:  No JVD. Good carotid pulses. No carotid bruits. No lymphadenopathy or thyromegaly. CARDIOVASCULAR EXAM:  S1 and S2 were normal.  No S3 or S4. Soft systolic blowing type murmur. No diastolic murmur. PMI was normal.  No lift, thrust, or pericardial friction rub. LUNGS:  Clear to auscultation and percussion. ABDOMEN:  Soft and nontender.   Good bowel sounds. EXTREMITIES:  Good femoral pulses. Good pedal pulses. Mild pedal edema. Skin was warm and dry. No calf tenderness. Nail beds pink. Good cap refill. PULSES:  Bilateral symmetrical radial, brachial and carotid pulses. No carotid bruits. Good femoral and pedal pulses. NEUROLOGIC EXAM:  Within normal limits. PSYCHIATRIC EXAM:  Within normal limits. LABORATORY DATA:  His sodium was 143, potassium 3.9, BUN 20, creatinine 1.32. GFR was 58. Glucose 103, calcium was 9.3. Cholesterol 209 with an HDL of 35, , triglycerides 244. ALT was 19, AST was 16. TSH 1.11. White count 6.4, hemoglobin 15.8 with a platelet count 459,159. EKG showed atrial fibrillation with a controlled ventricular response. Chest x-ray was unremarkable. Echocardiogram showed EF of 35% to 40%, with severely enlarged left atrium and right atrium. There was mild mitral regurgitation. IMPRESSION:  1. Cleared for left wrist surgery by Dr. Roderick Brady on 11/23. 2.  Functional class I at this time. 3.  Atrial fibrillation, which is probably chronic at this time, with him on Coumadin. 4.  Catheterization on 05/05/2015, showed 90% proximal LAD, 80% PDA, which arose from the circumflex, 80% small right coronary artery, EF of 50%. 5.  Severe peripheral vascular disease with 85% disease in the right internal carotid artery, 50% to 60% in the left internal carotid artery. 6.  Right carotid endarterectomy by Dr. Zan Benjamin on 06/09/2015.  7.  Open heart surgery by Dr. Paula Vazquez on 06/10/2015, with a LIMA to the LAD, vein graft to the PDA, which arose from the circumflex. 8.  Severe hypertension, which is under good control. 9.  Severe hyperlipidemia, untreated, as he does not wish to take statins. 10.  Idiopathic cardiomyopathy in 01/2018 with an EF at 20% to 25%, which has improved to 35% to 40%.   11.  Catheterization on 01/24/2018, showed patent LIMA to the LAD, occluded vein graft to the PDA, with an occluded native

## 2022-12-08 ENCOUNTER — HOSPITAL ENCOUNTER (OUTPATIENT)
Dept: PHARMACY | Age: 72
Setting detail: THERAPIES SERIES
Discharge: HOME | End: 2022-12-08
Payer: MEDICARE

## 2022-12-08 VITALS
SYSTOLIC BLOOD PRESSURE: 106 MMHG | BODY MASS INDEX: 27.05 KG/M2 | HEART RATE: 48 BPM | WEIGHT: 216.4 LBS | DIASTOLIC BLOOD PRESSURE: 71 MMHG

## 2022-12-08 DIAGNOSIS — I48.91 ATRIAL FIBRILLATION, UNSPECIFIED TYPE (HCC): Primary | ICD-10-CM

## 2022-12-08 LAB — INR BLD: 1.8

## 2022-12-08 PROCEDURE — 85610 PROTHROMBIN TIME: CPT

## 2022-12-08 PROCEDURE — 99211 OFF/OP EST MAY X REQ PHY/QHP: CPT

## 2022-12-08 NOTE — PROGRESS NOTES
[place] [address] [place] [address]/[place]  Medication Management  ANTICOAGULATION    Referring Provider: Dr Gail Arreoal INR: 2.0-3.0     TODAY'S INR: 1.8     WARFARIN Dosage: 6 mg x 1 dose, then resume 3 mg daily      INR (no units)   Date Value   2022 1.8   11/10/2022 3.7   10/28/2022 1.7   2022 2.4   2022 4.8   2022 1.5   2022 3.3       Medication changes:  None    Notes:    Fingerstick INR drawn per clinic protocol. Patient states no visible blood in urine and no black tarry stool. Denies any missed doses of warfarin. No change in other maintenance medications. As discussed during his last visit, Marciano Nelson had been scheduled to have surgery on his left wrist per Dr. Melva Riley on 2022 and was cleared to hold his warfarin for 5 days prior to the procedure per Dr. Ariana Elizabeth. However, Marciano Nelson says he decided to cancel this procedure \"for now\" since his deductible was $4200 and thinks he \"might be able to get along without it\". He says he has eaten a couple  salads recently, but \"none this week\". Since his INR has dropped slightly sub-therapeutic today and he took his warfarin already this morning, we will have him take 6 mg warfarin tomorrow x 1 booster dose, but then resume 3 mg warfarin daily thereafter as noted on his dosing calendar. We will recheck INR in 4 weeks. Patient acknowledges working in consult agreement with pharmacist as referred by his/her physician.                   For Pharmacy Admin Tracking Only    Intervention Detail: Adherence Monitorin and Dose Adjustment: 1, reason: Therapy Optimization  Total # of Interventions Recommended: 2  Total # of Interventions Accepted: 2  Time Spent (min): [address], [facility], PharmD

## 2023-01-10 ENCOUNTER — HOSPITAL ENCOUNTER (OUTPATIENT)
Dept: PHARMACY | Age: 73
Setting detail: THERAPIES SERIES
Discharge: HOME | End: 2023-01-10
Payer: MEDICARE

## 2023-01-10 VITALS
WEIGHT: 214.8 LBS | SYSTOLIC BLOOD PRESSURE: 99 MMHG | HEART RATE: 50 BPM | DIASTOLIC BLOOD PRESSURE: 68 MMHG | BODY MASS INDEX: 26.85 KG/M2

## 2023-01-10 DIAGNOSIS — I48.91 ATRIAL FIBRILLATION, UNSPECIFIED TYPE (HCC): Primary | ICD-10-CM

## 2023-01-10 LAB — INR BLD: 3

## 2023-01-10 PROCEDURE — 99211 OFF/OP EST MAY X REQ PHY/QHP: CPT

## 2023-01-10 PROCEDURE — 85610 PROTHROMBIN TIME: CPT

## 2023-01-10 RX ORDER — WARFARIN SODIUM 6 MG/1
TABLET ORAL
Qty: 90 TABLET | Refills: 3 | Status: SHIPPED
Start: 2023-01-10

## 2023-01-10 NOTE — PROGRESS NOTES
[place] [address] [place] [address]/[place]  Medication Management  ANTICOAGULATION    Referring Provider: Dr. Jose M Shay INR: 2.0 - 3.0    TODAY'S INR: 3.0    WARFARIN Dosage: 6 mg  and 3 mg all other days of the week     INR (no units)   Date Value   01/10/2023 3   2022 1.8   11/10/2022 3.7   10/28/2022 1.7   2022 2.4   2022 4.8   2022 1.5       Medication changes:  None    Notes:    Fingerstick INR drawn per clinic protocol. Patient states no visible blood in urine and no black tarry stool. Denies any missed doses of warfarin, however, Daly Green says he was confused about his warfarin dosage from the last visit and thought he was supposed to go back to 1 whole tablet (6 mg warfarin) on  and 1/2 tablet (3 mg warfarin) all other days of the week. All other medications reviewed for accuracy. He says he has not been taking his Vitamin C recently and has been \"sporadic\" with taking his vitamin E, ashwagandha, and cayenne supplements. He says he is still taking Melatonin \"most nights\" to sleep at night and confirms compliance with all his other medications at this time. No reported changes in other maintenance medications or in diet. Since his INR is therapeutic today, we will continue 6 mg warfarin on  and 3 mg all other days of the week and recheck INR in 5 weeks. Patient acknowledges working in consult agreement with pharmacist as referred by his/her physician.                   For Pharmacy Admin Tracking Only    Intervention Detail: Adherence Monitorin and Dose Adjustment: 1, reason: Therapy Optimization  Total # of Interventions Recommended: 2  Total # of Interventions Accepted: 2  Time Spent (min): [address], [facility], PharmD

## 2023-01-24 ENCOUNTER — HOSPITAL ENCOUNTER (OUTPATIENT)
Age: 73
Discharge: HOME | End: 2023-01-24
Payer: MEDICARE

## 2023-01-24 LAB
ABSOLUTE EOS #: 0.1 K/UL (ref 0–0.4)
ABSOLUTE LYMPH #: 1.5 K/UL (ref 1–4.8)
ABSOLUTE MONO #: 0.5 K/UL (ref 0–1)
ANION GAP SERPL CALCULATED.3IONS-SCNC: 11 MMOL/L (ref 9–17)
BASOPHILS # BLD: 0 % (ref 0–2)
BASOPHILS ABSOLUTE: 0 K/UL (ref 0–0.2)
BUN BLDV-MCNC: 26 MG/DL (ref 8–23)
BUN/CREAT BLD: 21 (ref 9–20)
CALCIUM SERPL-MCNC: 9.2 MG/DL (ref 8.6–10.4)
CHLORIDE BLD-SCNC: 107 MMOL/L (ref 98–107)
CO2: 22 MMOL/L (ref 20–31)
CREAT SERPL-MCNC: 1.21 MG/DL (ref 0.7–1.2)
DIFFERENTIAL TYPE: YES
EOSINOPHILS RELATIVE PERCENT: 1 % (ref 0–5)
GFR SERPL CREATININE-BSD FRML MDRD: >60 ML/MIN/1.73M2
GLUCOSE BLD-MCNC: 105 MG/DL (ref 70–99)
HCT VFR BLD CALC: 49.3 % (ref 41–53)
HEMOGLOBIN: 16.7 G/DL (ref 13.5–17.5)
INR BLD: 2.8
LYMPHOCYTES # BLD: 23 % (ref 13–44)
MCH RBC QN AUTO: 32.3 PG (ref 26–34)
MCHC RBC AUTO-ENTMCNC: 33.9 G/DL (ref 31–37)
MCV RBC AUTO: 95.2 FL (ref 80–100)
MONOCYTES # BLD: 8 % (ref 5–9)
PDW BLD-RTO: 12.9 % (ref 12.1–15.2)
PLATELET # BLD: 165 K/UL (ref 140–450)
POTASSIUM SERPL-SCNC: 4.3 MMOL/L (ref 3.7–5.3)
PROTHROMBIN TIME: 30.3 SEC (ref 11.5–14.2)
RBC # BLD: 5.18 M/UL (ref 4.5–5.9)
SEG NEUTROPHILS: 68 % (ref 39–75)
SEGMENTED NEUTROPHILS ABSOLUTE COUNT: 4.4 K/UL (ref 2.1–6.5)
SODIUM BLD-SCNC: 140 MMOL/L (ref 135–144)
WBC # BLD: 6.5 K/UL (ref 3.5–11)

## 2023-01-24 PROCEDURE — 87641 MR-STAPH DNA AMP PROBE: CPT

## 2023-01-24 PROCEDURE — 85610 PROTHROMBIN TIME: CPT

## 2023-01-24 PROCEDURE — 85025 COMPLETE CBC W/AUTO DIFF WBC: CPT

## 2023-01-24 PROCEDURE — 36415 COLL VENOUS BLD VENIPUNCTURE: CPT

## 2023-01-24 PROCEDURE — 80048 BASIC METABOLIC PNL TOTAL CA: CPT

## 2023-01-25 LAB
MRSA, DNA, NASAL: NEGATIVE
SPECIMEN DESCRIPTION: NORMAL

## 2023-02-14 ENCOUNTER — HOSPITAL ENCOUNTER (OUTPATIENT)
Dept: PHARMACY | Age: 73
Setting detail: THERAPIES SERIES
Discharge: HOME | End: 2023-02-14
Payer: MEDICARE

## 2023-02-14 VITALS
HEART RATE: 81 BPM | SYSTOLIC BLOOD PRESSURE: 121 MMHG | BODY MASS INDEX: 26.9 KG/M2 | WEIGHT: 215.2 LBS | DIASTOLIC BLOOD PRESSURE: 83 MMHG

## 2023-02-14 DIAGNOSIS — I48.91 ATRIAL FIBRILLATION, UNSPECIFIED TYPE (HCC): Primary | ICD-10-CM

## 2023-02-14 LAB — INR BLD: 1.1

## 2023-02-14 PROCEDURE — 99211 OFF/OP EST MAY X REQ PHY/QHP: CPT

## 2023-02-14 PROCEDURE — 85610 PROTHROMBIN TIME: CPT

## 2023-02-14 NOTE — PROGRESS NOTES
[address]/[place]  Medication Management  ANTICOAGULATION    Referring Provider: Dr. Xavi Barrios INR: 2.0 - 3.0     TODAY'S INR: 1.1     WARFARIN Dosage: hold x 1 prior to surgery tomorrow, then take 6 mg daily x 3 doses post-procedure before resuming previous dose of 6 mg  and 3 mg all other days of the week     INR (no units)   Date Value   2023 1.1   2023 2.8   01/10/2023 3   2022 1.8   11/10/2022 3.7   10/28/2022 1.7   2022 2.4   2022 4.8       Medication changes:  None    Notes:    Fingerstick INR drawn per clinic protocol. Patient states no visible blood in urine and no black tarry stool. Devan Zapata will have surgery on his left wrist for degenerative arthrosis per Dr. Marietta Chavis tomorrow at [facility] in [place]. He say he was instructed to stop his warfarin on \"1/10/2023\" prior to planned procedure tomorrow. He also says he was given four Lovenox 40 mg injections and instructed to start these 2 days prior to surgery, which he says he started yesterday and is taking every 12 hours until surgery. Devan Zapata says he stopped all his supplements and vitamins last week as well per Dr. Marietta Chavis until after the procedure. No change in other maintenance medications or in diet. Since his INR is 1.1 today and he will have surgical procedure tomorrow, he will skip warfarin again tonight and then resume as soon as it is safe to do so post-procedure per Dr. Marietta Chavis. We will have him take 6 mg warfarin daily for the first 3 doses post-procedure and then he will resume his previously stable weekly dosage of 6 mg on  and 3 mg all other days of the week as noted on his dosing calendar. We will recheck INR in 2 weeks. Patient acknowledges working in consult agreement with pharmacist as referred by his/her physician.                   For Pharmacy Admin Tracking Only    Intervention Detail: Adherence Monitorin and Dose Adjustment: 1, reason: Therapy Optimization  Total # of Interventions Recommended: 2  Total # of Interventions Accepted: 2  Time Spent (min): [address] [facility], [facility], PharmD

## 2023-02-28 ENCOUNTER — HOSPITAL ENCOUNTER (OUTPATIENT)
Dept: PHARMACY | Age: 73
Setting detail: THERAPIES SERIES
Discharge: HOME | End: 2023-02-28
Payer: MEDICARE

## 2023-02-28 VITALS
WEIGHT: 210 LBS | DIASTOLIC BLOOD PRESSURE: 73 MMHG | HEART RATE: 44 BPM | SYSTOLIC BLOOD PRESSURE: 116 MMHG | BODY MASS INDEX: 26.25 KG/M2

## 2023-02-28 DIAGNOSIS — I48.91 ATRIAL FIBRILLATION, UNSPECIFIED TYPE (HCC): Primary | ICD-10-CM

## 2023-02-28 LAB — INR BLD: 2.9

## 2023-02-28 PROCEDURE — 99211 OFF/OP EST MAY X REQ PHY/QHP: CPT

## 2023-02-28 PROCEDURE — 85610 PROTHROMBIN TIME: CPT

## 2023-02-28 NOTE — PROGRESS NOTES
[place] [facility]/[place]  Medication Management  ANTICOAGULATION  Referring Provider: Dr. Bryon Donnelly INR: 2.0 - 3.0     TODAY'S INR: 2.9     WARFARIN Dosage: 6 mg  and 3 mg all other days of the week     INR (no units)   Date Value   2023 2.9   2023 1.1   2023 2.8   01/10/2023 3   2022 1.8   11/10/2022 3.7   10/28/2022 1.7       Medication changes:  None    Notes:    Fingerstick INR drawn per clinic protocol. Patient states no visible blood in urine and no black tarry stool. As discussed at his last visit, Carlos Gonzalez had elective surgery on his left wrist for degenerative arthrosis per Dr. Shanae Jaramillo at [facility] in [place] on 2/15/2023. He had been instructed to stop his warfarin on \"2/10/2023\" prior to planned procedure and had been given four Lovenox 40 mg injections for self-injection every 12 hours starting 2 days prior to surgery. Carlos Gonzalez had also stopped all his supplements and vitamins per Dr. Shanae Jaramillo until after the procedure. He says he resumed his warfarin and all his other medications post-procedure as instructed and will follow up with Dr. Shanae Jaramillo tomorrow. He says he resumed his previously stable weekly warfarin regimen post-procedure and denies any missed doses since that time. No change in other maintenance medications or in diet. Since INR is therapeutic today, we will continue 6 mg on  and 3 mg all other days of the week as noted on his dosing calendar and recheck INR in 4 weeks. Patient acknowledges working in consult agreement with pharmacist as referred by his/her physician.            For Pharmacy Admin Tracking Only    Intervention Detail: Adherence Monitorin and Dose Adjustment: 1, reason: Therapy Optimization  Total # of Interventions Recommended: 2  Total # of Interventions Accepted: 2  Time Spent (min): [address], PharmD

## 2023-03-28 ENCOUNTER — HOSPITAL ENCOUNTER (OUTPATIENT)
Dept: PHARMACY | Age: 73
Setting detail: THERAPIES SERIES
Discharge: HOME | End: 2023-03-28
Payer: MEDICARE

## 2023-03-28 VITALS
WEIGHT: 209.2 LBS | SYSTOLIC BLOOD PRESSURE: 108 MMHG | DIASTOLIC BLOOD PRESSURE: 74 MMHG | HEART RATE: 69 BPM | BODY MASS INDEX: 26.15 KG/M2

## 2023-03-28 DIAGNOSIS — I48.91 ATRIAL FIBRILLATION, UNSPECIFIED TYPE (HCC): Primary | ICD-10-CM

## 2023-03-28 LAB — INR BLD: 2.2

## 2023-03-28 PROCEDURE — 99211 OFF/OP EST MAY X REQ PHY/QHP: CPT

## 2023-03-28 PROCEDURE — 85610 PROTHROMBIN TIME: CPT

## 2023-03-28 NOTE — PROGRESS NOTES
[facility]  [facility]ICO[facility]ULATION    Referring Provider: Dr. Jeannette Boateng INR: 2.0 - 3.0     TODAY'S INR: 2.2     WARFARIN Dosage: 6 mg  and 3 mg all other days of the week     INR (no units)   Date Value   2023 2.2   2023 2.9   2023 1.1   2023 2.8   01/10/2023 3   2022 1.8   11/10/2022 3.7       Medication changes:  None    Notes:    Fingerstick INR drawn per clinic protocol. Patient states no visible blood in urine and no black tarry stool. Denies any missed doses of warfarin. No change in other maintenance medications or in diet. Will recheck INR in 5 weeks. Patient acknowledges working in consult agreement with pharmacist as referred by his/her physician.                   For Pharmacy Admin Tracking Only    Intervention Detail: Adherence Monitorin  Total # of Interventions Recommended: 1  Total # of Interventions Accepted: 1  Time Spent (min): [address] [facility], [address] [place] [address], PharmD

## 2023-05-02 ENCOUNTER — HOSPITAL ENCOUNTER (OUTPATIENT)
Dept: PHARMACY | Age: 73
Setting detail: THERAPIES SERIES
Discharge: HOME | End: 2023-05-02
Payer: MEDICARE

## 2023-05-02 VITALS
DIASTOLIC BLOOD PRESSURE: 58 MMHG | WEIGHT: 212.2 LBS | SYSTOLIC BLOOD PRESSURE: 89 MMHG | HEART RATE: 79 BPM | BODY MASS INDEX: 26.52 KG/M2

## 2023-05-02 DIAGNOSIS — I48.91 ATRIAL FIBRILLATION, UNSPECIFIED TYPE (HCC): Primary | ICD-10-CM

## 2023-05-02 LAB — INR BLD: 2.8

## 2023-05-02 PROCEDURE — 85610 PROTHROMBIN TIME: CPT

## 2023-05-02 PROCEDURE — 99211 OFF/OP EST MAY X REQ PHY/QHP: CPT

## 2023-05-02 NOTE — PROGRESS NOTES
[place]/[place]  Medication Management  ANTICOAGULATION    Referring Provider: Dr. Lynne Beltrán INR: 2.0 - 3.0     TODAY'S INR: 2.8     WARFARIN Dosage: 6 mg  and 3 mg all other days of the week     INR (no units)   Date Value   2023 2.8   2023 2.2   2023 2.9   2023 1.1   2023 2.8   01/10/2023 3   2022 1.8   11/10/2022 3.7       Hemoglobin   Date Value Ref Range Status   2023 16.7 13.5 - 17.5 g/dL Final     Hematocrit   Date Value Ref Range Status   2023 49.3 41 - 53 % Final     ALT   Date Value Ref Range Status   10/31/2022 19 5 - 41 U/L Final     AST   Date Value Ref Range Status   10/31/2022 16 <40 U/L Final       Medication changes:  None    Notes:    Fingerstick INR drawn per clinic protocol. Patient states no visible blood in urine and no black tarry stool. Denies any missed doses of warfarin. No change in other maintenance medications or in diet. Will recheck INR in 6 weeks. Patient acknowledges working in consult agreement with pharmacist as referred by his/her physician.                   For Pharmacy Admin Tracking Only    Intervention Detail: Adherence Monitorin  Total # of Interventions Recommended: 1  Total # of Interventions Accepted: 1  Time Spent (min): [address] [facility], [facility], PharmD

## 2023-05-30 RX ORDER — POTASSIUM CHLORIDE 750 MG/1
TABLET, EXTENDED RELEASE ORAL
Qty: 180 TABLET | Refills: 3 | Status: SHIPPED | OUTPATIENT
Start: 2023-05-30

## 2023-07-25 ENCOUNTER — HOSPITAL ENCOUNTER (OUTPATIENT)
Dept: PHARMACY | Age: 73
Setting detail: THERAPIES SERIES
Discharge: HOME | End: 2023-07-25
Payer: MEDICARE

## 2023-07-25 VITALS
HEART RATE: 55 BPM | BODY MASS INDEX: 25.82 KG/M2 | SYSTOLIC BLOOD PRESSURE: 127 MMHG | WEIGHT: 206.6 LBS | DIASTOLIC BLOOD PRESSURE: 80 MMHG

## 2023-07-25 DIAGNOSIS — I48.91 ATRIAL FIBRILLATION, UNSPECIFIED TYPE (HCC): Primary | ICD-10-CM

## 2023-07-25 LAB — INR BLD: 3.4

## 2023-07-25 PROCEDURE — 99212 OFFICE O/P EST SF 10 MIN: CPT | Performed by: FAMILY MEDICINE

## 2023-07-25 PROCEDURE — 85610 PROTHROMBIN TIME: CPT | Performed by: FAMILY MEDICINE

## 2023-07-25 NOTE — PROGRESS NOTES
[facility]  Medication Management  ANTICOAGULATION    Referring Provider: Dr Dionicio Nielsen INR: 2.0-3.0    TODAY'S INR: 3.4    WARFARIN Dosage: hold x1 then 6mg , 3mg all other days    INR (no units)   Date Value   2023 3.4   2023 3.7   2023 2.8   2023 2.2   2023 2.9   2023 1.1   2023 2.8       Hemoglobin   Date Value Ref Range Status   2023 16.7 13.5 - 17.5 g/dL Final     Hematocrit   Date Value Ref Range Status   2023 49.3 41 - 53 % Final     ALT   Date Value Ref Range Status   10/31/2022 19 5 - 41 U/L Final     AST   Date Value Ref Range Status   10/31/2022 16 <40 U/L Final       Medication changes:  No changes    Notes:    Fingerstick INR drawn per clinic protocol. Patient states no visible blood in urine and no black tarry stool. Denies any missed doses of warfarin. No change in other maintenance medications or in diet. Will recheck INR in 6 weeks. Patient has again been taking warfarin 6mg on  AND  rather than just  as instructed. Patient will hold warfarin tomorrow (took dose this morning) then return to 6mg Monday, 3mg all other days dosing. Patient acknowledges working in consult agreement with pharmacist as referred by his/her physician.                   For Pharmacy Admin Tracking Only    Intervention Detail: Adherence Monitorin and Dose Adjustment: 1, reason: Therapy De-escalation  Total # of Interventions Recommended: 3  Total # of Interventions Accepted: 3  Time Spent (min):  25    Urban Koenig R.Ph., 2023,9:39 AM

## 2023-07-25 NOTE — PATIENT INSTRUCTIONS
Please do not take warfarin dose Wednesday morning then take 6mg (1 tablet) on Mondays and 3mg (1/2 tablet) all other days. Continue to monitor urine and stool for signs and symptoms of bleeding. Please notify the clinic of any medication changes. Please remember to bring all medications (both prescription and OTC) to your next visit. Kindly notify the clinic if you are unable to make to your next appointment. Continue current dose of warfarin as instructed on dosing calendar provided.

## 2023-09-06 ENCOUNTER — HOSPITAL ENCOUNTER (OUTPATIENT)
Dept: PHARMACY | Age: 73
Setting detail: THERAPIES SERIES
Discharge: HOME | End: 2023-09-06
Payer: MEDICARE

## 2023-09-06 VITALS
WEIGHT: 204 LBS | BODY MASS INDEX: 25.5 KG/M2 | DIASTOLIC BLOOD PRESSURE: 80 MMHG | SYSTOLIC BLOOD PRESSURE: 138 MMHG | HEART RATE: 83 BPM

## 2023-09-06 DIAGNOSIS — I48.91 ATRIAL FIBRILLATION, UNSPECIFIED TYPE (HCC): Primary | ICD-10-CM

## 2023-09-06 LAB — INR BLD: 6.4

## 2023-09-06 PROCEDURE — 99211 OFF/OP EST MAY X REQ PHY/QHP: CPT

## 2023-09-06 PROCEDURE — 85610 PROTHROMBIN TIME: CPT

## 2023-09-06 NOTE — PROGRESS NOTES
[facility]  [facility]ication [facility]  ANTICOAGULATION    Referring Provider: Dr Kenzie Junior INR: 2.0-3.0     TODAY'S INR: 6.4     WARFARIN Dosage: HOLD x 1, then decrease to 3 mg daily    INR (no units)   Date Value   2023 6.4   2023 3.4   2023 3.7   2023 2.8   2023 2.2   2023 2.9   2023 1.1       Hemoglobin   Date Value Ref Range Status   2023 16.7 13.5 - 17.5 g/dL Final     Hematocrit   Date Value Ref Range Status   2023 49.3 41 - 53 % Final     ALT   Date Value Ref Range Status   10/31/2022 19 5 - 41 U/L Final     AST   Date Value Ref Range Status   10/31/2022 16 <40 U/L Final       Medication changes:  None     Notes:    Fingerstick INR drawn per clinic protocol. Patient states no visible blood in urine, no black tarry stool but admits to hitting his forehead a couple times over the past week and states the wounds bled quite a bit. Was able to get each of them stopped. No medical services sought. Denies any missed or extra doses of warfarin. States he is still eating out of the garden but has been busy lately and has not been eating as much. No change in other maintenance medications or in diet. INR is significantly supratherapeutic so based on previous encounters, will hold today's dose and then decrease his maintenance dose to 3 mg daily. Will recheck INR in 2 weeks. Patient acknowledges working in consult agreement with pharmacist as referred by his/her physician.     For Pharmacy Admin Tracking Only    Intervention Detail: Adherence Monitorin and Dose Adjustment: 1, reason: Therapy Optimization  Total # of Interventions Recommended: 2  Total # of Interventions Accepted: 2  Time Spent (min): 20    Kenneth gNuyễn PharmD 2023 9:20 AM

## 2023-09-07 RX ORDER — HYDRALAZINE HYDROCHLORIDE 25 MG/1
TABLET, FILM COATED ORAL
Qty: 135 TABLET | Refills: 3 | Status: SHIPPED | OUTPATIENT
Start: 2023-09-07

## 2023-09-07 RX ORDER — FUROSEMIDE 20 MG/1
20 TABLET ORAL 3 TIMES DAILY
Qty: 270 TABLET | Refills: 3 | Status: SHIPPED | OUTPATIENT
Start: 2023-09-07

## 2023-09-20 ENCOUNTER — APPOINTMENT (OUTPATIENT)
Dept: PHARMACY | Age: 73
End: 2023-09-20
Payer: MEDICARE

## 2023-09-27 ENCOUNTER — HOSPITAL ENCOUNTER (OUTPATIENT)
Dept: PHARMACY | Age: 73
Setting detail: THERAPIES SERIES
Discharge: HOME | End: 2023-09-27
Payer: MEDICARE

## 2023-09-27 VITALS
WEIGHT: 200 LBS | BODY MASS INDEX: 25 KG/M2 | SYSTOLIC BLOOD PRESSURE: 142 MMHG | DIASTOLIC BLOOD PRESSURE: 93 MMHG | HEART RATE: 55 BPM

## 2023-09-27 LAB — INR BLD: 2.2

## 2023-09-27 PROCEDURE — 99211 OFF/OP EST MAY X REQ PHY/QHP: CPT

## 2023-09-27 PROCEDURE — 85610 PROTHROMBIN TIME: CPT

## 2023-09-27 NOTE — PROGRESS NOTES
[facility]  ANTICOAGULATION    Referring Provider: Dr Chong Jimenes INR: 2.0-3.0     TODAY'S INR: 2.2     WARFARIN Dosage: Continue 3 mg daily    INR (no units)   Date Value   2023 2.2   2023 6.4   2023 3.4   2023 3.7   2023 2.8   2023 2.2   2023 2.9       Hemoglobin   Date Value Ref Range Status   2023 16.7 13.5 - 17.5 g/dL Final     Hematocrit   Date Value Ref Range Status   2023 49.3 41 - 53 % Final     ALT   Date Value Ref Range Status   10/31/2022 19 5 - 41 U/L Final     AST   Date Value Ref Range Status   10/31/2022 16 <40 U/L Final       Medication changes:  None     Notes:    Fingerstick INR drawn per clinic protocol. Patient states no visible blood in urine or black tarry stool. No falls, ER visits or any other bruising/bleeding issues. States he may have missed 1 dose but denies any other missed or extra doses of warfarin. No change medications or diet. INR is therapeutic today so will continue 3 mg daily and will recheck INR in 3 weeks. Patient acknowledges working in consult agreement with pharmacist as referred by his/her physician.      For Pharmacy Admin Tracking Only    Intervention Detail: Adherence Monitorin  Total # of Interventions Recommended: 1  Total # of Interventions Accepted: 1  Time Spent (min): 20    Jose Luis Calhoun PharmD 2023 10:33 AM

## 2023-10-18 ENCOUNTER — HOSPITAL ENCOUNTER (OUTPATIENT)
Dept: PHARMACY | Age: 73
Setting detail: THERAPIES SERIES
Discharge: HOME | End: 2023-10-18
Payer: MEDICARE

## 2023-10-18 VITALS
SYSTOLIC BLOOD PRESSURE: 140 MMHG | HEART RATE: 64 BPM | BODY MASS INDEX: 25.6 KG/M2 | DIASTOLIC BLOOD PRESSURE: 106 MMHG | WEIGHT: 204.8 LBS

## 2023-10-18 DIAGNOSIS — I48.91 ATRIAL FIBRILLATION, UNSPECIFIED TYPE (HCC): Primary | ICD-10-CM

## 2023-10-18 LAB — INR BLD: 3.3

## 2023-10-18 PROCEDURE — 85610 PROTHROMBIN TIME: CPT

## 2023-10-18 PROCEDURE — 99211 OFF/OP EST MAY X REQ PHY/QHP: CPT

## 2023-10-18 RX ORDER — WARFARIN SODIUM 3 MG/1
TABLET ORAL
Qty: 90 TABLET | Refills: 3 | OUTPATIENT
Start: 2023-10-18

## 2023-10-18 NOTE — PROGRESS NOTES
[facility]  ANTICOAGULATION    Referring Provider: Dr Rosalba Benavides INR: 2.0-3.0     TODAY'S INR: 3.3     WARFARIN Dosage: Decrease dose to 1.5 mg W, 3 mg all other days of the week    INR (no units)   Date Value   10/18/2023 3.3   2023 2.2   2023 6.4   2023 3.4   2023 3.7   2023 2.8   2023 2.2       Hemoglobin   Date Value Ref Range Status   2023 16.7 13.5 - 17.5 g/dL Final     Hematocrit   Date Value Ref Range Status   2023 49.3 41 - 53 % Final     ALT   Date Value Ref Range Status   10/31/2022 19 5 - 41 U/L Final     AST   Date Value Ref Range Status   10/31/2022 16 <40 U/L Final       Medication changes:  None     Notes:    Fingerstick INR drawn per clinic protocol. Patient states no visible blood in urine, black tarry stool, falls or ER visits and denies any missed or extra doses of warfarin. No change in other maintenance medications or in diet. INR is slightly supratherapeutic so will decrease dose to 1.5 mg W, 3 mg all other days of the week and will recheck INR in 4 weeks. New rx for 3 mg tablets called into [facility]. Patient acknowledges working in consult agreement with pharmacist as referred by his/her physician.     For Pharmacy Admin Tracking Only    Intervention Detail: Adherence Monitorin and Dose Adjustment: 1, reason: Therapy Optimization  Total # of Interventions Recommended: 2  Total # of Interventions Accepted: 2  Time Spent (min): 30    Medardo Ortez PharmD 10/18/2023 11:26 AM

## 2023-11-06 ENCOUNTER — HOSPITAL ENCOUNTER (OUTPATIENT)
Age: 73
Discharge: HOME | End: 2023-11-06
Payer: MEDICARE

## 2023-11-06 ENCOUNTER — HOSPITAL ENCOUNTER (OUTPATIENT)
Dept: GENERAL RADIOLOGY | Age: 73
Discharge: HOME | End: 2023-11-08
Payer: MEDICARE

## 2023-11-06 ENCOUNTER — OFFICE VISIT (OUTPATIENT)
Dept: CARDIOLOGY CLINIC | Age: 73
End: 2023-11-06

## 2023-11-06 ENCOUNTER — HOSPITAL ENCOUNTER (OUTPATIENT)
Age: 73
Discharge: HOME | End: 2023-11-08
Payer: MEDICARE

## 2023-11-06 VITALS
BODY MASS INDEX: 25.62 KG/M2 | WEIGHT: 205 LBS | DIASTOLIC BLOOD PRESSURE: 70 MMHG | SYSTOLIC BLOOD PRESSURE: 130 MMHG | HEART RATE: 90 BPM | OXYGEN SATURATION: 97 %

## 2023-11-06 DIAGNOSIS — I48.0 PAROXYSMAL ATRIAL FIBRILLATION (HCC): ICD-10-CM

## 2023-11-06 DIAGNOSIS — I25.10 CORONARY ARTERY DISEASE DUE TO LIPID RICH PLAQUE: ICD-10-CM

## 2023-11-06 DIAGNOSIS — Z95.1 S/P CABG X 2: ICD-10-CM

## 2023-11-06 DIAGNOSIS — E78.00 PURE HYPERCHOLESTEROLEMIA: ICD-10-CM

## 2023-11-06 DIAGNOSIS — I25.83 CORONARY ARTERY DISEASE DUE TO LIPID RICH PLAQUE: ICD-10-CM

## 2023-11-06 DIAGNOSIS — I10 ESSENTIAL HYPERTENSION: ICD-10-CM

## 2023-11-06 DIAGNOSIS — E55.9 VITAMIN D DEFICIENCY: ICD-10-CM

## 2023-11-06 DIAGNOSIS — I10 ESSENTIAL HYPERTENSION: Primary | ICD-10-CM

## 2023-11-06 LAB
ALBUMIN SERPL-MCNC: 4.3 G/DL (ref 3.5–5.2)
ALP SERPL-CCNC: 70 U/L (ref 40–129)
ALT SERPL-CCNC: 18 U/L (ref 5–41)
ANION GAP SERPL CALCULATED.3IONS-SCNC: 8 MMOL/L (ref 9–17)
AST SERPL-CCNC: 17 U/L
BASOPHILS # BLD: 0.03 K/UL (ref 0–0.2)
BASOPHILS NFR BLD: 1 % (ref 0–2)
BILIRUB SERPL-MCNC: 0.8 MG/DL (ref 0.3–1.2)
BUN SERPL-MCNC: 20 MG/DL (ref 8–23)
BUN/CREAT SERPL: 20 (ref 9–20)
CALCIUM SERPL-MCNC: 9 MG/DL (ref 8.6–10.4)
CHLORIDE SERPL-SCNC: 108 MMOL/L (ref 98–107)
CHOLEST SERPL-MCNC: 223 MG/DL (ref 0–199)
CHOLESTEROL/HDL RATIO: 6
CO2 SERPL-SCNC: 26 MMOL/L (ref 20–31)
CREAT SERPL-MCNC: 1 MG/DL (ref 0.7–1.2)
EKG Q-T INTERVAL: 436 MS
EKG QRS DURATION: 122 MS
EKG QTC CALCULATION (BAZETT): 509 MS
EKG R AXIS: 27 DEGREES
EKG T AXIS: -170 DEGREES
EKG VENTRICULAR RATE: 82 BPM
EOSINOPHIL # BLD: 0.12 K/UL (ref 0–0.4)
EOSINOPHILS RELATIVE PERCENT: 2 % (ref 0–5)
ERYTHROCYTE [DISTWIDTH] IN BLOOD BY AUTOMATED COUNT: 12.2 % (ref 12.1–15.2)
GFR SERPL CREATININE-BSD FRML MDRD: >60 ML/MIN/1.73M2
GLUCOSE SERPL-MCNC: 98 MG/DL (ref 70–99)
HCT VFR BLD AUTO: 42.8 % (ref 41–53)
HDLC SERPL-MCNC: 41 MG/DL
HGB BLD-MCNC: 15.3 G/DL (ref 13.5–17.5)
IMM GRANULOCYTES # BLD AUTO: 0 K/UL (ref 0–0.3)
IMM GRANULOCYTES NFR BLD: 0 % (ref 0–5)
LDLC SERPL CALC-MCNC: 158 MG/DL (ref 0–100)
LYMPHOCYTES NFR BLD: 1.27 K/UL (ref 1–4.8)
LYMPHOCYTES RELATIVE PERCENT: 24 % (ref 13–44)
MAGNESIUM SERPL-MCNC: 2.4 MG/DL (ref 1.6–2.6)
MCH RBC QN AUTO: 32.6 PG (ref 26–34)
MCHC RBC AUTO-ENTMCNC: 35.7 G/DL (ref 31–37)
MCV RBC AUTO: 91.1 FL (ref 80–100)
MONOCYTES NFR BLD: 0.5 K/UL (ref 0–1)
MONOCYTES NFR BLD: 9 % (ref 5–9)
NEUTROPHILS NFR BLD: 64 % (ref 39–75)
NEUTS SEG NFR BLD: 3.39 K/UL (ref 2.1–6.5)
PLATELET # BLD AUTO: 129 K/UL (ref 140–450)
PMV BLD AUTO: 10.8 FL (ref 6–12)
POTASSIUM SERPL-SCNC: 4.2 MMOL/L (ref 3.7–5.3)
PROT SERPL-MCNC: 6.7 G/DL (ref 6.4–8.3)
RBC # BLD AUTO: 4.7 M/UL (ref 4.5–5.9)
SODIUM SERPL-SCNC: 142 MMOL/L (ref 135–144)
TRIGL SERPL-MCNC: 125 MG/DL (ref 0–149)
TSH SERPL DL<=0.05 MIU/L-ACNC: 0.83 UIU/ML (ref 0.3–5)
VLDLC SERPL CALC-MCNC: 25 MG/DL
WBC OTHER # BLD: 5.3 K/UL (ref 3.5–11)

## 2023-11-06 PROCEDURE — 84443 ASSAY THYROID STIM HORMONE: CPT

## 2023-11-06 PROCEDURE — 85025 COMPLETE CBC W/AUTO DIFF WBC: CPT

## 2023-11-06 PROCEDURE — 83735 ASSAY OF MAGNESIUM: CPT

## 2023-11-06 PROCEDURE — 80061 LIPID PANEL: CPT

## 2023-11-06 PROCEDURE — 36415 COLL VENOUS BLD VENIPUNCTURE: CPT

## 2023-11-06 PROCEDURE — 93005 ELECTROCARDIOGRAM TRACING: CPT

## 2023-11-06 PROCEDURE — 93010 ELECTROCARDIOGRAM REPORT: CPT | Performed by: INTERNAL MEDICINE

## 2023-11-06 PROCEDURE — 71046 X-RAY EXAM CHEST 2 VIEWS: CPT

## 2023-11-06 PROCEDURE — 80053 COMPREHEN METABOLIC PANEL: CPT

## 2023-11-06 NOTE — PROGRESS NOTES
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 Dia Hensley still on probation. Bedside echo done    Will see in 1 year.

## 2023-11-15 ENCOUNTER — HOSPITAL ENCOUNTER (OUTPATIENT)
Dept: PHARMACY | Age: 73
Setting detail: THERAPIES SERIES
Discharge: HOME | End: 2023-11-15
Payer: MEDICARE

## 2023-11-15 VITALS
BODY MASS INDEX: 25.32 KG/M2 | WEIGHT: 202.6 LBS | HEART RATE: 57 BPM | DIASTOLIC BLOOD PRESSURE: 74 MMHG | SYSTOLIC BLOOD PRESSURE: 109 MMHG

## 2023-11-15 DIAGNOSIS — I48.91 ATRIAL FIBRILLATION, UNSPECIFIED TYPE (HCC): Primary | ICD-10-CM

## 2023-11-15 LAB — INR BLD: 2.5

## 2023-11-15 PROCEDURE — 85610 PROTHROMBIN TIME: CPT

## 2023-11-15 PROCEDURE — 99211 OFF/OP EST MAY X REQ PHY/QHP: CPT

## 2023-11-15 NOTE — PROGRESS NOTES
[facility]  [facility]ICO[facility]UL[facility]    Referring Provider: Dr Sandie Gomes INR: 2.0 - 3.0     TODAY'S INR: 2.5     WARFARIN Dosage: Continue 3 mg daily (1/2 of 6 mg tablet OR 1 whole 3 mg tablet)    INR (no units)   Date Value   10/18/2023 3.3   2023 2.2   2023 6.4   2023 3.4   2023 3.7   2023 2.8   2023 2.2       Hemoglobin   Date Value Ref Range Status   2023 15.3 13.5 - 17.5 g/dL Final     Hematocrit   Date Value Ref Range Status   2023 42.8 41.0 - 53.0 % Final     ALT   Date Value Ref Range Status   2023 18 5 - 41 U/L Final     AST   Date Value Ref Range Status   2023 17 <40 U/L Final       Medication changes:  None      Notes:    Fingerstick INR drawn per clinic protocol. Patient states no visible blood in urine and no black tarry stool. Denies any missed doses of warfarin; however, Denis Lawrence says he has been taking \"1/2 tablet\" everyday of the 6 mg tablet. He doesn't think he even picked up an Rx for warfarin 3 mg tablets from his pharmacy, but will check when he gets home. I have explained the color difference and the tablet markings on both the 6 mg and the 3 mg tablets during the visit and asked him to call our clinic if he has any further questions regarding the tablets he has at home. No change in other maintenance medications or in diet. Since nico INR is therapeutic today, he will continue taking 3 mg warfarin daily (1/2 of the 6 mg tablet, which is what he claims to have at home). We will recheck INR in 6 weeks. Patient acknowledges working in consult agreement with pharmacist as referred by his/her physician.                   For Pharmacy Admin Tracking Only    Intervention Detail: Adherence Monitorin  Total # of Interventions Recommended: 1  Total # of Interventions Accepted: 1  Time Spent (min): PARVEEN Camejo [facility], PharmD

## 2023-12-29 ENCOUNTER — HOSPITAL ENCOUNTER (OUTPATIENT)
Dept: PHARMACY | Age: 73
Setting detail: THERAPIES SERIES
Discharge: HOME | End: 2023-12-29

## 2023-12-29 VITALS
WEIGHT: 207.6 LBS | DIASTOLIC BLOOD PRESSURE: 89 MMHG | SYSTOLIC BLOOD PRESSURE: 141 MMHG | HEART RATE: 91 BPM | BODY MASS INDEX: 25.95 KG/M2

## 2023-12-29 LAB — INR BLD: 3.9

## 2023-12-29 NOTE — PROGRESS NOTES
[facility]  Medication Management  ANTICOAGULATION    Referring Provider: Dr Lawyer Zayas INR: 2.0-3.0     TODAY'S INR: 3.9     WARFARIN Dosage: HOLD x 1 (), then resume 3 mg daily      INR (no units)   Date Value   2023 3.9   11/15/2023 2.5   10/18/2023 3.3   2023 2.2   2023 6.4   2023 3.4   2023 3.7       Hemoglobin   Date Value Ref Range Status   2023 15.3 13.5 - 17.5 g/dL Final     Hematocrit   Date Value Ref Range Status   2023 42.8 41.0 - 53.0 % Final     ALT   Date Value Ref Range Status   2023 18 5 - 41 U/L Final     AST   Date Value Ref Range Status   2023 17 <40 U/L Final       Medication changes:  None     Notes:    Fingerstick INR drawn per clinic protocol. Patient states no visible blood in urine, black tarry stool, falls or ER visits and denies any missed or extra doses of warfarin. States that he has been really busy lately and has not been eating as he should but he is working on getting back on track. No change in other medications or diet. INR is supratherapeutic so will hold tomorrow's dose (he already took today's dose) before resuming 3 mg daily and will recheck INR in 4 weeks. Patient acknowledges working in consult agreement with pharmacist as referred by his/her physician.     For Pharmacy Admin Tracking Only    Intervention Detail: Adherence Monitorin and Dose Adjustment: 1, reason: Therapy Optimization  Total # of Interventions Recommended: 2  Total # of Interventions Accepted: 2  Time Spent (min): 20    Sandra GoffD 2023 8:53 AM

## 2024-01-26 ENCOUNTER — HOSPITAL ENCOUNTER (OUTPATIENT)
Dept: PHARMACY | Age: 74
Setting detail: THERAPIES SERIES
Discharge: HOME | End: 2024-01-26
Payer: MEDICARE

## 2024-01-26 VITALS
WEIGHT: 206 LBS | SYSTOLIC BLOOD PRESSURE: 100 MMHG | HEART RATE: 47 BPM | DIASTOLIC BLOOD PRESSURE: 62 MMHG | BODY MASS INDEX: 25.75 KG/M2

## 2024-01-26 LAB — INR BLD: 1.9

## 2024-01-26 PROCEDURE — 99211 OFF/OP EST MAY X REQ PHY/QHP: CPT

## 2024-01-26 PROCEDURE — 85610 PROTHROMBIN TIME: CPT

## 2024-01-26 NOTE — PROGRESS NOTES
[facility]  [facility]UL[facility]    Referring Provider: Dr Agustin Steiner     GOAL INR: 2.0-3.0     TODAY'S INR: 1.9     WARFARIN Dosage: 6 mg x 1, then resume 3 mg daily    INR (no units)   Date Value   2024 1.9   2023 3.9   11/15/2023 2.5   10/18/2023 3.3   2023 2.2   2023 6.4   2023 3.4       Hemoglobin   Date Value Ref Range Status   2023 15.3 13.5 - 17.5 g/dL Final     Hematocrit   Date Value Ref Range Status   2023 42.8 41.0 - 53.0 % Final     ALT   Date Value Ref Range Status   2023 18 5 - 41 U/L Final     AST   Date Value Ref Range Status   2023 17 <40 U/L Final       Medication changes:  None     Notes:    Fingerstick INR drawn per clinic protocol. Patient states no visible blood in urine, black tarry stool, falls or ER visits and denies any extra doses of warfarin. He does inform that he held his dose for 1 day prior to a cataract surgery about a week and a half ago and will need to hold for another day prior to surgery on his other eye. He also states that he has been eating a lot of cabbage and other greens lately. No other changes in medications or diet. INR is slightly subtherapeutic but is down 2 points over the last month, most like due to his increased Vitamin K consumption. He states he'll cut back a bit on Vitamin K so he will take 6 mg this evening prior to resuming 3 mg daily and will recheck INR in 4 weeks. Patient acknowledges working in consult agreement with pharmacist as referred by his/her physician.    For Pharmacy Admin Tracking Only    Intervention Detail: Adherence Monitorin and Dose Adjustment: 1, reason: Therapy Optimization  Total # of Interventions Recommended: 2  Total # of Interventions Accepted: 2  Time Spent (min): 20    Jacob Peñaloza, PharmD 2024 1:05 PM

## 2024-02-16 RX ORDER — CARVEDILOL 6.25 MG/1
TABLET ORAL
Qty: 180 TABLET | Refills: 3 | Status: SHIPPED | OUTPATIENT
Start: 2024-02-16

## 2024-02-23 ENCOUNTER — HOSPITAL ENCOUNTER (OUTPATIENT)
Dept: PHARMACY | Age: 74
Setting detail: THERAPIES SERIES
Discharge: HOME | End: 2024-02-23
Payer: MEDICARE

## 2024-02-23 VITALS
DIASTOLIC BLOOD PRESSURE: 76 MMHG | HEART RATE: 53 BPM | BODY MASS INDEX: 26 KG/M2 | SYSTOLIC BLOOD PRESSURE: 134 MMHG | WEIGHT: 208 LBS

## 2024-02-23 LAB — INR BLD: 2.5

## 2024-02-23 PROCEDURE — 85610 PROTHROMBIN TIME: CPT

## 2024-02-23 PROCEDURE — 99211 OFF/OP EST MAY X REQ PHY/QHP: CPT

## 2024-02-23 NOTE — PROGRESS NOTES
[facility]  ANTICO[facility]ULATION     Referring Provider: Dr Agustin Steiner     GOAL INR: 2.0-3.0     TODAY'S INR: 2.5     WARFARIN Dosage: Continue 3 mg daily       INR (no units)   Date Value   2024 2.5   2024 1.9   2023 3.9   11/15/2023 2.5   10/18/2023 3.3   2023 2.2   2023 6.4       Hemoglobin   Date Value Ref Range Status   2023 15.3 13.5 - 17.5 g/dL Final     Hematocrit   Date Value Ref Range Status   2023 42.8 41.0 - 53.0 % Final     ALT   Date Value Ref Range Status   2023 18 5 - 41 U/L Final     AST   Date Value Ref Range Status   2023 17 <40 U/L Final       Medication changes:  None     Notes:    Fingerstick INR drawn per clinic protocol. Patient states no visible blood in urine, black tarry stool, falls or ER visits and denies any missed or extra doses of warfarin. He has been eating less cabbage now that his garden finally . No change in other maintenance medications or in diet. INR is therapeutic so will continue 3 mg daily and will recheck INR in 6 weeks. Patient acknowledges working in consult agreement with pharmacist as referred by his/her physician.    For Pharmacy Admin Tracking Only    Intervention Detail: Adherence Monitorin  Total # of Interventions Recommended: 1  Total # of Interventions Accepted: 1  Time Spent (min): 20    Jacob Peñaloza, PharmCHRISTIANE 2024 10:12 AM

## 2024-04-05 ENCOUNTER — HOSPITAL ENCOUNTER (OUTPATIENT)
Dept: PHARMACY | Age: 74
Setting detail: THERAPIES SERIES
Discharge: HOME | End: 2024-04-05
Payer: MEDICARE

## 2024-04-05 VITALS
DIASTOLIC BLOOD PRESSURE: 56 MMHG | SYSTOLIC BLOOD PRESSURE: 81 MMHG | WEIGHT: 204.6 LBS | BODY MASS INDEX: 25.57 KG/M2 | HEART RATE: 61 BPM

## 2024-04-05 LAB — INR BLD: 2.6

## 2024-04-05 PROCEDURE — 99211 OFF/OP EST MAY X REQ PHY/QHP: CPT

## 2024-04-05 PROCEDURE — 85610 PROTHROMBIN TIME: CPT

## 2024-04-05 NOTE — PROGRESS NOTES
[facility]  [facility]UL[facility]    Referring Provider: Dr Agustin Steiner     GOAL INR: 2.0-3.0     TODAY'S INR: 2.6     WARFARIN Dosage: Continue 3 mg daily    INR (no units)   Date Value   2024 2.6   2024 2.5   2024 1.9   2023 3.9   11/15/2023 2.5   10/18/2023 3.3   2023 2.2       Hemoglobin   Date Value Ref Range Status   2023 15.3 13.5 - 17.5 g/dL Final     Hematocrit   Date Value Ref Range Status   2023 42.8 41.0 - 53.0 % Final     ALT   Date Value Ref Range Status   2023 18 5 - 41 U/L Final     AST   Date Value Ref Range Status   2023 17 <40 U/L Final       Medication changes:  None     Notes:    Fingerstick INR drawn per clinic protocol. Patient states no visible blood in urine, black tarry stool, falls or ER visits and denies any missed or extra doses of warfarin. No change in other maintenance medications or in diet. INR is therapeutic so will continue 3 mg daily and will recheck INR in 6 weeks. Patient acknowledges working in consult agreement with pharmacist as referred by his/her physician.    For Pharmacy Admin Tracking Only    Intervention Detail: Adherence Monitorin  Total # of Interventions Recommended: 1  Total # of Interventions Accepted: 1  Time Spent (min): 20    Jacob Peñaloza, PharmD 2024 2:02 PM

## 2024-04-10 RX ORDER — CARVEDILOL 6.25 MG/1
TABLET ORAL
Qty: 60 TABLET | Refills: 0 | Status: SHIPPED | OUTPATIENT
Start: 2024-04-10

## 2024-04-10 RX ORDER — FUROSEMIDE 20 MG/1
20 TABLET ORAL 3 TIMES DAILY
Qty: 90 TABLET | Refills: 0 | Status: SHIPPED | OUTPATIENT
Start: 2024-04-10

## 2024-04-10 RX ORDER — HYDRALAZINE HYDROCHLORIDE 25 MG/1
TABLET, FILM COATED ORAL
Qty: 45 TABLET | Refills: 0 | Status: SHIPPED | OUTPATIENT
Start: 2024-04-10

## 2024-04-16 ENCOUNTER — OFFICE VISIT (OUTPATIENT)
Dept: FAMILY MEDICINE CLINIC | Age: 74
End: 2024-04-16
Payer: MEDICARE

## 2024-04-16 VITALS
SYSTOLIC BLOOD PRESSURE: 132 MMHG | DIASTOLIC BLOOD PRESSURE: 82 MMHG | BODY MASS INDEX: 26.25 KG/M2 | HEART RATE: 78 BPM | WEIGHT: 210 LBS

## 2024-04-16 DIAGNOSIS — I25.83 CORONARY ARTERY DISEASE DUE TO LIPID RICH PLAQUE: Primary | ICD-10-CM

## 2024-04-16 DIAGNOSIS — Z12.11 COLON CANCER SCREENING: ICD-10-CM

## 2024-04-16 DIAGNOSIS — I48.0 PAROXYSMAL ATRIAL FIBRILLATION (HCC): ICD-10-CM

## 2024-04-16 DIAGNOSIS — I10 ESSENTIAL HYPERTENSION: ICD-10-CM

## 2024-04-16 DIAGNOSIS — E78.00 PURE HYPERCHOLESTEROLEMIA: ICD-10-CM

## 2024-04-16 DIAGNOSIS — I25.10 CORONARY ARTERY DISEASE DUE TO LIPID RICH PLAQUE: Primary | ICD-10-CM

## 2024-04-16 PROBLEM — I50.21 ACUTE SYSTOLIC CONGESTIVE HEART FAILURE (HCC): Status: RESOLVED | Noted: 2018-01-07 | Resolved: 2024-04-16

## 2024-04-16 PROBLEM — D69.6 THROMBOCYTOPENIA, UNSPECIFIED (HCC): Status: RESOLVED | Noted: 2024-04-16 | Resolved: 2024-04-16

## 2024-04-16 PROBLEM — I50.21 CHF (CONGESTIVE HEART FAILURE), NYHA CLASS IV, ACUTE, SYSTOLIC (HCC): Status: RESOLVED | Noted: 2018-01-07 | Resolved: 2024-04-16

## 2024-04-16 PROBLEM — D69.6 THROMBOCYTOPENIA, UNSPECIFIED (HCC): Status: ACTIVE | Noted: 2024-04-16

## 2024-04-16 PROBLEM — I42.9 CARDIOMYOPATHY (HCC): Status: RESOLVED | Noted: 2018-01-25 | Resolved: 2024-04-16

## 2024-04-16 PROCEDURE — 3079F DIAST BP 80-89 MM HG: CPT | Performed by: FAMILY MEDICINE

## 2024-04-16 PROCEDURE — 1123F ACP DISCUSS/DSCN MKR DOCD: CPT | Performed by: FAMILY MEDICINE

## 2024-04-16 PROCEDURE — 99203 OFFICE O/P NEW LOW 30 MIN: CPT | Performed by: FAMILY MEDICINE

## 2024-04-16 PROCEDURE — 3075F SYST BP GE 130 - 139MM HG: CPT | Performed by: FAMILY MEDICINE

## 2024-04-16 SDOH — ECONOMIC STABILITY: FOOD INSECURITY: WITHIN THE PAST 12 MONTHS, YOU WORRIED THAT YOUR FOOD WOULD RUN OUT BEFORE YOU GOT MONEY TO BUY MORE.: NEVER TRUE

## 2024-04-16 SDOH — ECONOMIC STABILITY: FOOD INSECURITY: WITHIN THE PAST 12 MONTHS, THE FOOD YOU BOUGHT JUST DIDN'T LAST AND YOU DIDN'T HAVE MONEY TO GET MORE.: NEVER TRUE

## 2024-04-16 SDOH — ECONOMIC STABILITY: HOUSING INSECURITY
IN THE LAST 12 MONTHS, WAS THERE A TIME WHEN YOU DID NOT HAVE A STEADY PLACE TO SLEEP OR SLEPT IN A SHELTER (INCLUDING NOW)?: NO

## 2024-04-16 SDOH — ECONOMIC STABILITY: INCOME INSECURITY: HOW HARD IS IT FOR YOU TO PAY FOR THE VERY BASICS LIKE FOOD, HOUSING, MEDICAL CARE, AND HEATING?: NOT HARD AT ALL

## 2024-04-16 ASSESSMENT — ENCOUNTER SYMPTOMS
BLOOD IN STOOL: 0
NAUSEA: 0
ABDOMINAL PAIN: 0
COUGH: 0
EYE DISCHARGE: 0
VOMITING: 0
DIARRHEA: 0
CHEST TIGHTNESS: 0
EYE REDNESS: 0
SHORTNESS OF BREATH: 0
TROUBLE SWALLOWING: 0
CONSTIPATION: 0

## 2024-04-16 ASSESSMENT — PATIENT HEALTH QUESTIONNAIRE - PHQ9
2. FEELING DOWN, DEPRESSED OR HOPELESS: NOT AT ALL
SUM OF ALL RESPONSES TO PHQ QUESTIONS 1-9: 0
1. LITTLE INTEREST OR PLEASURE IN DOING THINGS: NOT AT ALL
SUM OF ALL RESPONSES TO PHQ9 QUESTIONS 1 & 2: 0

## 2024-04-16 NOTE — PROGRESS NOTES
depression      Return in about 1 year (around 4/16/2025) for HTN, Hyperlipidemia, CAD.      Electronically signed by Alondra Cavazos MD on 4/16/2024 at 9:04 AM

## 2024-04-19 DIAGNOSIS — Z12.11 COLON CANCER SCREENING: ICD-10-CM

## 2024-04-19 LAB
CONTROL: NORMAL
FECAL BLOOD IMMUNOCHEMICAL TEST: NEGATIVE

## 2024-04-19 PROCEDURE — 82274 ASSAY TEST FOR BLOOD FECAL: CPT | Performed by: FAMILY MEDICINE

## 2024-05-24 ENCOUNTER — HOSPITAL ENCOUNTER (OUTPATIENT)
Dept: PHARMACY | Age: 74
Setting detail: THERAPIES SERIES
Discharge: HOME | End: 2024-05-24
Payer: MEDICARE

## 2024-05-24 VITALS
WEIGHT: 207.2 LBS | SYSTOLIC BLOOD PRESSURE: 126 MMHG | DIASTOLIC BLOOD PRESSURE: 81 MMHG | BODY MASS INDEX: 25.9 KG/M2 | HEART RATE: 55 BPM

## 2024-05-24 LAB — INR BLD: 1.6

## 2024-05-24 PROCEDURE — 85610 PROTHROMBIN TIME: CPT

## 2024-05-24 PROCEDURE — 99211 OFF/OP EST MAY X REQ PHY/QHP: CPT

## 2024-05-24 NOTE — PROGRESS NOTES
[facility]  ANTICOAGULATION    Referring Provider: Dr Agustin Steiner     GOAL INR: 2.0-3.0     TODAY'S INR: 1.6     WARFARIN Dosage: 6 mg x 1, then resume 3 mg daily    INR (no units)   Date Value   2024 1.6   2024 2.6   2024 2.5   2024 1.9   2023 3.9   11/15/2023 2.5   10/18/2023 3.3       Hemoglobin   Date Value Ref Range Status   2023 15.3 13.5 - 17.5 g/dL Final     Hematocrit   Date Value Ref Range Status   2023 42.8 41.0 - 53.0 % Final     ALT   Date Value Ref Range Status   2023 18 5 - 41 U/L Final     AST   Date Value Ref Range Status   2023 17 <40 U/L Final       Medication changes:  None     Notes:    Fingerstick INR drawn per clinic protocol. Patient states no visible blood in urine, black tarry stool, falls or ER visits and denies any missed or extra doses of warfarin. States that he has been eating a lot of spinach and other greens recently. Re-examined the effects of Vitamin K and dietary handouts provided. No change in other maintenance medications or diet. INR is subtherapeutic so will order 6 mg today before resuming 3 mg daily and will recheck INR in 4 weeks. Patient acknowledges working in consult agreement with pharmacist as referred by his/her physician.    For Pharmacy Admin Tracking Only    Intervention Detail: Adherence Monitorin and Dose Adjustment: 1, reason: Therapy Optimization  Total # of Interventions Recommended: 2  Total # of Interventions Accepted: 2  Time Spent (min): 20    Jacob Peñaloza, PharmD 2024 8:33 AM

## 2024-06-21 ENCOUNTER — HOSPITAL ENCOUNTER (OUTPATIENT)
Dept: PHARMACY | Age: 74
Setting detail: THERAPIES SERIES
Discharge: HOME | End: 2024-06-21
Payer: MEDICARE

## 2024-06-21 VITALS
DIASTOLIC BLOOD PRESSURE: 84 MMHG | SYSTOLIC BLOOD PRESSURE: 132 MMHG | HEART RATE: 67 BPM | WEIGHT: 204.4 LBS | BODY MASS INDEX: 25.55 KG/M2

## 2024-06-21 LAB — INR BLD: 2.3

## 2024-06-21 PROCEDURE — 85610 PROTHROMBIN TIME: CPT

## 2024-06-21 PROCEDURE — 99211 OFF/OP EST MAY X REQ PHY/QHP: CPT

## 2024-06-21 NOTE — PROGRESS NOTES
[facility]  [facility]ICO[facility]UL[facility]    Referring Provider: Dr Agustin Steiner     GOAL INR: 2.0-3.0     TODAY'S INR: 2.3     WARFARIN Dosage: Continue 3 mg daily    INR (no units)   Date Value   2024 2.3   2024 1.6   2024 2.6   2024 2.5   2024 1.9   2023 3.9   11/15/2023 2.5       Hemoglobin   Date Value Ref Range Status   2023 15.3 13.5 - 17.5 g/dL Final     Hematocrit   Date Value Ref Range Status   2023 42.8 41.0 - 53.0 % Final     ALT   Date Value Ref Range Status   2023 18 5 - 41 U/L Final     AST   Date Value Ref Range Status   2023 17 <40 U/L Final       Medication changes:  None     Notes:    Fingerstick INR drawn per clinic protocol. Patient states no visible blood in urine, black tarry stool, falls or ER visits and denies any missed or extra doses of warfarin. No change in other maintenance medications or in diet. INR is therapeutic so will continue 3 mg daily and will recheck INR in 6 weeks. Patient acknowledges working in consult agreement with pharmacist as referred by his/her physician.    For Pharmacy Admin Tracking Only    Intervention Detail: Adherence Monitorin  Total # of Interventions Recommended: 1  Total # of Interventions Accepted: 1  Time Spent (min): 20    Jacob Peñaloza, PharmD 2024 9:45 AM

## 2024-08-02 ENCOUNTER — ANTI-COAG VISIT (OUTPATIENT)
Age: 74
End: 2024-08-02
Payer: MEDICARE

## 2024-08-02 VITALS
WEIGHT: 203.8 LBS | HEART RATE: 74 BPM | BODY MASS INDEX: 25.47 KG/M2 | SYSTOLIC BLOOD PRESSURE: 102 MMHG | DIASTOLIC BLOOD PRESSURE: 63 MMHG

## 2024-08-02 LAB — INR BLD: 1.5

## 2024-08-02 PROCEDURE — 85610 PROTHROMBIN TIME: CPT | Performed by: PHARMACIST

## 2024-08-02 PROCEDURE — 99211 OFF/OP EST MAY X REQ PHY/QHP: CPT | Performed by: PHARMACIST

## 2024-08-02 NOTE — PROGRESS NOTES
[facility]  ANTICOAGULATION    Referring Provider: Dr Agustin Steiner    GOAL INR: 2.0-3.0    TODAY'S INR: 1.5    WARFARIN Dosage: 6 mg x 1, then resume 3 mg daily    INR (no units)   Date Value   2024 1.5   2024 2.3   2024 1.6   2024 2.6   2024 2.5   2024 1.9   2023 3.9       Hemoglobin   Date Value Ref Range Status   2023 15.3 13.5 - 17.5 g/dL Final     Hematocrit   Date Value Ref Range Status   2023 42.8 41.0 - 53.0 % Final     ALT   Date Value Ref Range Status   2023 18 5 - 41 U/L Final     AST   Date Value Ref Range Status   2023 17 <40 U/L Final       Medication changes:  None     Notes:    Fingerstick INR drawn per clinic protocol. Patient states no visible blood in urine, black tarry stool, falls or ER visits and denies any missed or extra doses of warfarin. No change in medications but does advise that he has had more greens than usual recently. His INR is subtherapeutic today, similar to his appointment on 24, so will repeat the 6 mg today before resuming 3 mg daily and will recheck INR in 4 weeks. Patient acknowledges working in consult agreement with pharmacist as referred by his/her physician.    For Pharmacy Admin Tracking Only    Intervention Detail: Adherence Monitorin and Dose Adjustment: 1, reason: Therapy Optimization  Total # of Interventions Recommended: 2  Total # of Interventions Accepted: 2  Time Spent (min): 20    Jacob Peñaloza, PharmD 2024 2:26 PM

## 2024-08-26 RX ORDER — FUROSEMIDE 20 MG
20 TABLET ORAL 3 TIMES DAILY
Qty: 90 TABLET | Refills: 3 | Status: SHIPPED | OUTPATIENT
Start: 2024-08-26 | End: 2024-08-27 | Stop reason: SDUPTHER

## 2024-08-26 NOTE — TELEPHONE ENCOUNTER
Patient called for refill of his Furosemide 20mg taking 2 in the am and 1 in the afternoon.  Needs to go to [facility] for 90 day supply.  He is totally out and asked if a 30 day supply could also be sent to [facility].

## 2024-08-28 RX ORDER — FUROSEMIDE 20 MG
20 TABLET ORAL 3 TIMES DAILY
Qty: 270 TABLET | Refills: 3 | Status: SHIPPED | OUTPATIENT
Start: 2024-08-28

## 2024-08-28 RX ORDER — FUROSEMIDE 20 MG
20 TABLET ORAL 3 TIMES DAILY
Qty: 90 TABLET | Refills: 0 | Status: SHIPPED | OUTPATIENT
Start: 2024-08-28 | End: 2024-08-28 | Stop reason: SDUPTHER

## 2024-08-30 ENCOUNTER — APPOINTMENT (OUTPATIENT)
Age: 74
End: 2024-08-30
Payer: MEDICARE

## 2024-09-03 ENCOUNTER — ANTI-COAG VISIT (OUTPATIENT)
Age: 74
End: 2024-09-03
Payer: MEDICARE

## 2024-09-03 VITALS
BODY MASS INDEX: 26.07 KG/M2 | WEIGHT: 208.6 LBS | SYSTOLIC BLOOD PRESSURE: 136 MMHG | DIASTOLIC BLOOD PRESSURE: 86 MMHG | HEART RATE: 64 BPM

## 2024-09-03 DIAGNOSIS — I48.91 ATRIAL FIBRILLATION, UNSPECIFIED TYPE (HCC): Primary | ICD-10-CM

## 2024-09-03 LAB
INTERNATIONAL NORMALIZATION RATIO, POC: 1.1
PROTHROMBIN TIME, POC: 0

## 2024-09-03 PROCEDURE — 85610 PROTHROMBIN TIME: CPT

## 2024-09-03 PROCEDURE — 99211 OFF/OP EST MAY X REQ PHY/QHP: CPT

## 2024-09-03 NOTE — PROGRESS NOTES
[facility]  ANTICOAGUL[facility]    Referring Provider: Dr Agustin Steiner     GOAL INR: 2.0 - 3.0     TODAY'S INR: 1.1     WARFARIN Dosage: 6 mg x 2 booster doses, then resume 3 mg daily      INR (no units)   Date Value   09/03/2024 1.1   08/02/2024 1.5   06/21/2024 2.3   05/24/2024 1.6   04/05/2024 2.6   02/23/2024 2.5   01/26/2024 1.9   12/29/2023 3.9     INR,(POC) (no units)   Date Value   09/03/2024 1.1       Hemoglobin   Date Value Ref Range Status   11/06/2023 15.3 13.5 - 17.5 g/dL Final     Hematocrit   Date Value Ref Range Status   11/06/2023 42.8 41.0 - 53.0 % Final     ALT   Date Value Ref Range Status   11/06/2023 18 5 - 41 U/L Final     AST   Date Value Ref Range Status   11/06/2023 17 <40 U/L Final       Medication changes:  Stopped taking ALL his prescription medications for the \"past 4 days\"      Notes:    Fingerstick INR drawn per clinic protocol. Patient states no visible blood in urine and no black tarry stool. Henri states that he hasn't taken \"any\" of his medications in the past \"4 days\", including his warfarin. Then, he says he \"has been taking (his) red yeast rice and vitamin E, but nothing else\". He states that he had been taking \"3 Lasix\" tablets every day and was \"going to the bathroom all the time\". He says he doesn't think he \"needs all that\" and shows me that he has no swelling in bilateral lower extremities. He also complains that he was getting \"ocular migraines\", which he attributes to having \"dry eyes\" and confirms that this was mentioned to him by his eye doctor when he had cataracts removed earlier this year. He says he wanted to \"see what his numbers were today\" before he restarted any of his medications. He says he \"thinks\" he will \"just take 1 Lasix\" per day. I have encouraged him to restart his Rx medications as prescribed. His BP is more elevated today than it had been in previous visits to the clinic. No change in other maintenance

## 2024-09-17 ENCOUNTER — ANTI-COAG VISIT (OUTPATIENT)
Age: 74
End: 2024-09-17
Payer: MEDICARE

## 2024-09-17 VITALS
DIASTOLIC BLOOD PRESSURE: 69 MMHG | SYSTOLIC BLOOD PRESSURE: 124 MMHG | HEART RATE: 50 BPM | WEIGHT: 206.4 LBS | BODY MASS INDEX: 25.8 KG/M2

## 2024-09-17 DIAGNOSIS — I48.91 ATRIAL FIBRILLATION, UNSPECIFIED TYPE (HCC): Primary | ICD-10-CM

## 2024-09-17 LAB
INTERNATIONAL NORMALIZATION RATIO, POC: 2.5
PROTHROMBIN TIME, POC: 0

## 2024-09-17 PROCEDURE — 85610 PROTHROMBIN TIME: CPT

## 2024-09-17 PROCEDURE — 99211 OFF/OP EST MAY X REQ PHY/QHP: CPT

## 2024-09-17 RX ORDER — SOY ISOFLAVONE 40 MG
500 TABLET ORAL DAILY
COMMUNITY

## 2024-09-26 RX ORDER — HYDRALAZINE HYDROCHLORIDE 25 MG/1
TABLET, FILM COATED ORAL
Qty: 135 TABLET | Refills: 97 | Status: SHIPPED | OUTPATIENT
Start: 2024-09-26

## 2024-09-26 RX ORDER — FUROSEMIDE 20 MG
TABLET ORAL
Qty: 270 TABLET | Refills: 97 | Status: SHIPPED | OUTPATIENT
Start: 2024-09-26

## 2024-10-23 ENCOUNTER — OFFICE VISIT (OUTPATIENT)
Dept: FAMILY MEDICINE CLINIC | Age: 74
End: 2024-10-23

## 2024-10-23 VITALS
BODY MASS INDEX: 25.74 KG/M2 | HEART RATE: 60 BPM | OXYGEN SATURATION: 97 % | HEIGHT: 75 IN | DIASTOLIC BLOOD PRESSURE: 72 MMHG | WEIGHT: 207 LBS | SYSTOLIC BLOOD PRESSURE: 114 MMHG

## 2024-10-23 DIAGNOSIS — Z00.00 INITIAL MEDICARE ANNUAL WELLNESS VISIT: Primary | ICD-10-CM

## 2024-10-23 ASSESSMENT — PATIENT HEALTH QUESTIONNAIRE - PHQ9
1. LITTLE INTEREST OR PLEASURE IN DOING THINGS: NOT AT ALL
2. FEELING DOWN, DEPRESSED OR HOPELESS: NOT AT ALL
SUM OF ALL RESPONSES TO PHQ QUESTIONS 1-9: 0
SUM OF ALL RESPONSES TO PHQ9 QUESTIONS 1 & 2: 0
SUM OF ALL RESPONSES TO PHQ QUESTIONS 1-9: 0

## 2024-10-23 ASSESSMENT — LIFESTYLE VARIABLES
HOW OFTEN DO YOU HAVE A DRINK CONTAINING ALCOHOL: 2-3 TIMES A WEEK
HOW MANY STANDARD DRINKS CONTAINING ALCOHOL DO YOU HAVE ON A TYPICAL DAY: 1 OR 2

## 2024-10-23 NOTE — PROGRESS NOTES
Medicare Annual Wellness Visit    Logan Ventura is here for Medicare AWV (Patient here today for AWV)    Assessment & Plan   Initial Medicare annual wellness visit    Recommendations for Preventive Services Due: see orders and patient instructions/AVS.  Recommended screening schedule for the next 5-10 years is provided to the patient in written form: see Patient Instructions/AVS.     Return in about 1 year (around 10/23/2025) for medicare wellness.     Subjective   The following acute and/or chronic problems were also addressed today:   1.) Medicare wellness     Patient's complete Health Risk Assessment and screening values have been reviewed and are found in Flowsheets. The following problems were reviewed today and where indicated follow up appointments were made and/or referrals ordered.    Positive Risk Factor Screenings with Interventions:                   Dentist Screen:  Have you seen the dentist within the past year?: (!) No    Intervention:  Advised to schedule with their dentist      Safety:  Do you have any tripping hazards - loose or unsecured carpets or rugs?: (!) Yes  Interventions:  Patient declined any further interventions or treatment     Advanced Directives:  Do you have a Living Will?: (!) No    Intervention:  has NO advanced directive - information provided               Objective   Vitals:    10/23/24 0919   BP: 114/72   Pulse: 60   SpO2: 97%   Weight: 93.9 kg (207 lb)   Height: 1.905 m (6' 3\")      Body mass index is 25.87 kg/m².      General Appearance: alert and oriented to person, place and time, well-developed and well nourished, in no acute distress, and alert  Skin: warm and dry, no rash or erythema  Head: normocephalic and atraumatic  Eyes: pupils equal, round, and reactive to light, extraocular eye movements intact, and conjunctivae normal  ENT: bilateral tympanic membrane normal, bilateral external ear normal, and bilateral ear canal normal  Neck: neck supple and non tender without

## 2024-10-23 NOTE — PATIENT INSTRUCTIONS
while other may be subject to a deductible, co-insurance, and/or copay.    Some of these benefits include a comprehensive review of your medical history including lifestyle, illnesses that may run in your family, and various assessments and screenings as appropriate.    After reviewing your medical record and screening and assessments performed today your provider may have ordered immunizations, labs, imaging, and/or referrals for you.  A list of these orders (if applicable) as well as your Preventive Care list are included within your After Visit Summary for your review.    Other Preventive Recommendations:    A preventive eye exam performed by an eye specialist is recommended every 1-2 years to screen for glaucoma; cataracts, macular degeneration, and other eye disorders.  A preventive dental visit is recommended every 6 months.  Try to get at least 150 minutes of exercise per week or 10,000 steps per day on a pedometer .  Order or download the FREE \"Exercise & Physical Activity: Your Everyday Guide\" from [employer]. Call 1-232.987.3327 or search [employer] online.  You need 4709-0819 mg of calcium and 3426-0761 IU of vitamin D per day. It is possible to meet your calcium requirement with diet alone, but a vitamin D supplement is usually necessary to meet this goal.  When exposed to the sun, use a sunscreen that protects against both UVA and UVB radiation with an SPF of 30 or greater. Reapply every 2 to 3 hours or after sweating, drying off with a towel, or swimming.  Always wear a seat belt when traveling in a car. Always wear a helmet when riding a bicycle or motorcycle.

## 2024-10-28 ENCOUNTER — HOSPITAL ENCOUNTER (OUTPATIENT)
Age: 74
Discharge: HOME | End: 2024-10-28
Payer: MEDICARE

## 2024-10-28 ENCOUNTER — HOSPITAL ENCOUNTER (OUTPATIENT)
Dept: GENERAL RADIOLOGY | Age: 74
Discharge: HOME | End: 2024-10-30
Payer: MEDICARE

## 2024-10-28 ENCOUNTER — HOSPITAL ENCOUNTER (OUTPATIENT)
Age: 74
Discharge: HOME | End: 2024-10-30
Payer: MEDICARE

## 2024-10-28 DIAGNOSIS — E78.00 PURE HYPERCHOLESTEROLEMIA: ICD-10-CM

## 2024-10-28 DIAGNOSIS — I25.10 CORONARY ARTERY DISEASE DUE TO LIPID RICH PLAQUE: ICD-10-CM

## 2024-10-28 DIAGNOSIS — I48.0 PAROXYSMAL ATRIAL FIBRILLATION (HCC): ICD-10-CM

## 2024-10-28 DIAGNOSIS — I10 ESSENTIAL HYPERTENSION: ICD-10-CM

## 2024-10-28 DIAGNOSIS — E55.9 VITAMIN D DEFICIENCY: ICD-10-CM

## 2024-10-28 DIAGNOSIS — I25.83 CORONARY ARTERY DISEASE DUE TO LIPID RICH PLAQUE: ICD-10-CM

## 2024-10-28 LAB
25(OH)D3 SERPL-MCNC: 39 NG/ML (ref 30–100)
ALBUMIN SERPL-MCNC: 4 G/DL (ref 3.5–5.2)
ALP SERPL-CCNC: 59 U/L (ref 40–129)
ALT SERPL-CCNC: 20 U/L (ref 5–41)
ANION GAP SERPL CALCULATED.3IONS-SCNC: 9 MMOL/L (ref 9–17)
AST SERPL-CCNC: 19 U/L
BASOPHILS # BLD: 0.03 K/UL (ref 0–0.2)
BASOPHILS NFR BLD: 1 % (ref 0–2)
BILIRUB SERPL-MCNC: 0.8 MG/DL (ref 0.3–1.2)
BUN SERPL-MCNC: 20 MG/DL (ref 8–23)
BUN/CREAT SERPL: 20 (ref 9–20)
CALCIUM SERPL-MCNC: 9 MG/DL (ref 8.6–10.4)
CHLORIDE SERPL-SCNC: 110 MMOL/L (ref 98–107)
CHOLEST SERPL-MCNC: 181 MG/DL (ref 0–199)
CHOLESTEROL/HDL RATIO: 4
CO2 SERPL-SCNC: 25 MMOL/L (ref 20–31)
CREAT SERPL-MCNC: 1 MG/DL (ref 0.7–1.2)
EOSINOPHIL # BLD: 0.12 K/UL (ref 0–0.4)
EOSINOPHILS RELATIVE PERCENT: 2 % (ref 0–5)
ERYTHROCYTE [DISTWIDTH] IN BLOOD BY AUTOMATED COUNT: 12.6 % (ref 12.1–15.2)
GFR, ESTIMATED: 79 ML/MIN/1.73M2
GLUCOSE SERPL-MCNC: 97 MG/DL (ref 70–99)
HCT VFR BLD AUTO: 41.6 % (ref 41–53)
HDLC SERPL-MCNC: 43 MG/DL
HGB BLD-MCNC: 14.8 G/DL (ref 13.5–17.5)
IMM GRANULOCYTES # BLD AUTO: 0.01 K/UL (ref 0–0.3)
IMM GRANULOCYTES NFR BLD: 0 % (ref 0–5)
LDLC SERPL CALC-MCNC: 112 MG/DL (ref 0–100)
LYMPHOCYTES NFR BLD: 1.3 K/UL (ref 1–4.8)
LYMPHOCYTES RELATIVE PERCENT: 22 % (ref 13–44)
MAGNESIUM SERPL-MCNC: 2.1 MG/DL (ref 1.6–2.6)
MCH RBC QN AUTO: 32.9 PG (ref 26–34)
MCHC RBC AUTO-ENTMCNC: 35.6 G/DL (ref 31–37)
MCV RBC AUTO: 92.4 FL (ref 80–100)
MONOCYTES NFR BLD: 0.45 K/UL (ref 0–1)
MONOCYTES NFR BLD: 8 % (ref 5–9)
NEUTROPHILS NFR BLD: 67 % (ref 39–75)
NEUTS SEG NFR BLD: 3.95 K/UL (ref 2.1–6.5)
PLATELET # BLD AUTO: 129 K/UL (ref 140–450)
PMV BLD AUTO: 10.2 FL (ref 6–12)
POTASSIUM SERPL-SCNC: 4.8 MMOL/L (ref 3.7–5.3)
PROT SERPL-MCNC: 6.4 G/DL (ref 6.4–8.3)
RBC # BLD AUTO: 4.5 M/UL (ref 4.5–5.9)
SODIUM SERPL-SCNC: 144 MMOL/L (ref 135–144)
TRIGL SERPL-MCNC: 130 MG/DL
TSH SERPL DL<=0.05 MIU/L-ACNC: 0.67 UIU/ML (ref 0.3–5)
VLDLC SERPL CALC-MCNC: 26 MG/DL
WBC OTHER # BLD: 5.9 K/UL (ref 3.5–11)

## 2024-10-28 PROCEDURE — 93005 ELECTROCARDIOGRAM TRACING: CPT

## 2024-10-28 PROCEDURE — 36415 COLL VENOUS BLD VENIPUNCTURE: CPT

## 2024-10-28 PROCEDURE — 85025 COMPLETE CBC W/AUTO DIFF WBC: CPT

## 2024-10-28 PROCEDURE — 80061 LIPID PANEL: CPT

## 2024-10-28 PROCEDURE — 71046 X-RAY EXAM CHEST 2 VIEWS: CPT

## 2024-10-28 PROCEDURE — 80053 COMPREHEN METABOLIC PANEL: CPT

## 2024-10-28 PROCEDURE — 83735 ASSAY OF MAGNESIUM: CPT

## 2024-10-28 PROCEDURE — 82306 VITAMIN D 25 HYDROXY: CPT

## 2024-10-28 PROCEDURE — 84443 ASSAY THYROID STIM HORMONE: CPT

## 2024-10-29 ENCOUNTER — ANTI-COAG VISIT (OUTPATIENT)
Age: 74
End: 2024-10-29
Payer: MEDICARE

## 2024-10-29 VITALS
SYSTOLIC BLOOD PRESSURE: 107 MMHG | HEART RATE: 73 BPM | BODY MASS INDEX: 26.25 KG/M2 | WEIGHT: 210 LBS | DIASTOLIC BLOOD PRESSURE: 69 MMHG

## 2024-10-29 DIAGNOSIS — I48.91 ATRIAL FIBRILLATION, UNSPECIFIED TYPE (HCC): Primary | ICD-10-CM

## 2024-10-29 LAB
EKG Q-T INTERVAL: 428 MS
EKG QRS DURATION: 120 MS
EKG QTC CALCULATION (BAZETT): 465 MS
EKG R AXIS: 54 DEGREES
EKG T AXIS: -30 DEGREES
EKG VENTRICULAR RATE: 71 BPM
INTERNATIONAL NORMALIZATION RATIO, POC: 1.6
PROTHROMBIN TIME, POC: 0

## 2024-10-29 PROCEDURE — 85610 PROTHROMBIN TIME: CPT

## 2024-10-29 PROCEDURE — 99211 OFF/OP EST MAY X REQ PHY/QHP: CPT

## 2024-10-29 NOTE — PROGRESS NOTES
[facility]  [facility]UL[facility]    Referring Provider: Dr Agustin Steiner     GOAL INR: 2.0 - 3.0     TODAY'S INR: 1.6     WARFARIN Dosage: 6 mg x 1 booster dose, then resume 3 mg EVERYDAY of the week       INR (no units)   Date Value   08/02/2024 1.5   06/21/2024 2.3   05/24/2024 1.6   04/05/2024 2.6   02/23/2024 2.5   01/26/2024 1.9   12/29/2023 3.9     INR,(POC) (no units)   Date Value   09/17/2024 2.5   09/03/2024 1.1       Hemoglobin   Date Value Ref Range Status   10/28/2024 14.8 13.5 - 17.5 g/dL Final     Hematocrit   Date Value Ref Range Status   10/28/2024 41.6 41.0 - 53.0 % Final     ALT   Date Value Ref Range Status   10/28/2024 20 5 - 41 U/L Final     AST   Date Value Ref Range Status   10/28/2024 19 <40 U/L Final       Medication changes:  Taking \"only 1 tablet\" of his Furosemide, Isosorbide, Potassium, \"baby\" Aspirin (81 mg), and Hydralazine every AM  \"Ran out\" of his Vitamin C, Vitamin D, Cayenne, Ginseng, and Sulema Root and has not been taking his Melatonin, but did start taking OTC \"Niacin\" once a day since his last visit here in the clinic      Notes:     Fingerstick INR drawn per clinic protocol. Patient states no visible blood in urine and no black tarry stool. Henri confirms that he has been taking \"1/2 tablet\" (3 mg warfarin) daily, but \"could have\" missed a dose \"possibly\" since his last visit here in the clinic. As discussed previously, he had stopped \"ALL\" his Rx (including his warfarin) and most OTC medications for \"4 days\" back in September. He restarted his medications as recommended, but says he's only been taking \"everything just once a day\" and \"feels so much better\". He admits to taking 1 tablet of his Furosemide, Isosorbide, Potassium, \"baby\" Aspirin (81 mg), and Hydralazine every day in the AM only. He also states that he has been taking Vitamin E, Red Yeast Rice, \"prenatal\" Multivitamin, L-arginine, Echinacea, B complex vitamin, and

## 2024-11-04 ENCOUNTER — OFFICE VISIT (OUTPATIENT)
Dept: CARDIOLOGY CLINIC | Age: 74
End: 2024-11-04

## 2024-11-04 VITALS — OXYGEN SATURATION: 97 % | HEART RATE: 82 BPM | DIASTOLIC BLOOD PRESSURE: 70 MMHG | SYSTOLIC BLOOD PRESSURE: 120 MMHG

## 2024-11-04 DIAGNOSIS — R09.89 BRUIT: ICD-10-CM

## 2024-11-04 DIAGNOSIS — I65.29 STENOSIS OF CAROTID ARTERY, UNSPECIFIED LATERALITY: ICD-10-CM

## 2024-11-04 DIAGNOSIS — I10 ESSENTIAL HYPERTENSION: Primary | ICD-10-CM

## 2024-11-04 DIAGNOSIS — I25.10 CORONARY ARTERY DISEASE DUE TO LIPID RICH PLAQUE: ICD-10-CM

## 2024-11-04 DIAGNOSIS — I48.0 PAROXYSMAL ATRIAL FIBRILLATION (HCC): ICD-10-CM

## 2024-11-04 DIAGNOSIS — I25.83 CORONARY ARTERY DISEASE DUE TO LIPID RICH PLAQUE: ICD-10-CM

## 2024-11-04 DIAGNOSIS — E55.9 VITAMIN D DEFICIENCY: ICD-10-CM

## 2024-11-04 DIAGNOSIS — E78.00 PURE HYPERCHOLESTEROLEMIA: ICD-10-CM

## 2024-11-04 NOTE — PROGRESS NOTES
Ov Dr. Steiner for one year follow up   Pt states \"they got it in there\" regarding  His meds   But not taking water pills like it says  \"Dehydrates\"me   All pills takes one daily   No chest pain   No palpitations   No new sob   No dizziness   No hospitalizations/er visits  Had cataract surgery - [facility]   Still playing montalvo  Jax is living with his mom  Bedside echo done     Will set up for Carotid U/S    Follow up in one year

## 2024-11-05 NOTE — PROGRESS NOTES
Agustin Steiner M.D.  [facility]  [facility]  [address] [id number] [phone]      2024      Alondra Cavazos MD  [address] [place] [address]       RE:  LENNIE VENTURA  :  1950      Dear Dr. Cavazos:    CHIEF COMPLAINT:    Severe coronary artery disease.  Severe peripheral vascular disease.  Ischemic cardiomyopathy, EF of 35% range.  Chronic atrial fibrillation, on Coumadin.    HISTORY OF PRESENT ILLNESS:  I had the pleasure of seeing Mr. Ventura in the office on 2024.  He is a pleasant 74-year-old gentleman, whom I met on 2015, for cardiac clearance for left knee replacement.  He was very hypertensive.  He had a strong family history of coronary artery disease and severe hyperlipidemia.  A stress test was abnormal on May 5, 2015, which resulted in a catheterization on May 5, 2015, that showed 90% LAD in the proximal portion, 80% circumflex, 80% RCA with an EF of 50%. Also had critical right carotid artery disease and had right carotid endarterectomy by Dr. Maxwell on 2015.    This was followed by open heart surgery by Dr. Chapin Fan on Kathryn 10, 2015, LIMA to LAD, and vein graft to the PDA.    He came to the emergency room on 2018, with a right lower lobe pneumonia.  An echocardiogram showed an EF of 25% with moderate to severe MR.  Repeat catheterization on 2018, showed patent LIMA to LAD, circumflex had 50% disease.  PDA was occluded which arose from the circumflex and a vein graft to the PDA was occluded.  The right coronary artery small, nondominant. EF was 30% to 35% and his EF has remained in that range.    He developed atrial fibrillation on 2018, was placed on Xarelto and converted to sinus rhythm.  However, reverted back to atrial fibrillation, was asymptomatic and, therefore, I did not attempt to reconvert.  He has been maintained on Coumadin.    He

## 2024-11-15 ENCOUNTER — TELEPHONE (OUTPATIENT)
Dept: CARDIOLOGY CLINIC | Age: 74
End: 2024-11-15

## 2024-11-15 NOTE — TELEPHONE ENCOUNTER
Pt called in with concerns about the price of the carotid ultrasound. States that he is unable to afford it and will not be having this test completed.

## 2024-11-18 ENCOUNTER — TELEPHONE (OUTPATIENT)
Dept: CARDIOLOGY CLINIC | Age: 74
End: 2024-11-18

## 2024-12-03 ENCOUNTER — ANTI-COAG VISIT (OUTPATIENT)
Age: 74
End: 2024-12-03
Payer: MEDICARE

## 2024-12-03 VITALS
WEIGHT: 208.8 LBS | SYSTOLIC BLOOD PRESSURE: 131 MMHG | DIASTOLIC BLOOD PRESSURE: 92 MMHG | HEART RATE: 64 BPM | BODY MASS INDEX: 26.1 KG/M2

## 2024-12-03 DIAGNOSIS — I48.91 ATRIAL FIBRILLATION, UNSPECIFIED TYPE (HCC): Primary | ICD-10-CM

## 2024-12-03 LAB
INTERNATIONAL NORMALIZATION RATIO, POC: 1.1
PROTHROMBIN TIME, POC: 0

## 2024-12-03 PROCEDURE — 99212 OFFICE O/P EST SF 10 MIN: CPT | Performed by: FAMILY MEDICINE

## 2024-12-03 PROCEDURE — 85610 PROTHROMBIN TIME: CPT | Performed by: FAMILY MEDICINE

## 2024-12-03 NOTE — PATIENT INSTRUCTIONS
Please take 1 tablet warfarin (6mg) today and tomorrow then resume 3mg (1/2 tablet) every day.  Continue to monitor urine and stool for signs and symptoms of bleeding.   Please notify the clinic of any medication changes.   Please remember to bring all medications (both prescription and OTC) to your next visit.   Kindly notify the clinic if you are unable to make to your next appointment.   Follow warfarin dosing instructions on dosing calendar provided.

## 2024-12-03 NOTE — PROGRESS NOTES
[facility]  ANTICOAGULATION    Referring Provider: Dr Steiner    GOAL INR: 2.0-3.0    TODAY'S INR: 1.1    WARFARIN Dosage: 6mg x2 then resume 3mg daily    INR (no units)   Date Value   2024 1.5   2024 2.3   2024 1.6   2024 2.6   2024 2.5   2024 1.9   2023 3.9     INR,(POC) (no units)   Date Value   2024 1.1   10/29/2024 1.6   2024 2.5   2024 1.1       Hemoglobin   Date Value Ref Range Status   10/28/2024 14.8 13.5 - 17.5 g/dL Final     Hematocrit   Date Value Ref Range Status   10/28/2024 41.6 41.0 - 53.0 % Final     ALT   Date Value Ref Range Status   10/28/2024 20 5 - 41 U/L Final     AST   Date Value Ref Range Status   10/28/2024 19 <40 U/L Final       Medication changes:  No change    Notes:    Fingerstick INR drawn per clinic protocol. Patient states no visible blood in urine and no black tarry stool. Denies any missed doses of warfarin. No change in other maintenance medications or in diet. Will recheck INR in 2 weeks. Patient states he missed all medications for 2 days. Patient states he has been eating \"high greens\" salads recently but vague on details.  Patient will take warfarin 6mg x2 days then resume 3mg daily. Patient acknowledges working in consult agreement with pharmacist as referred by his/her physician.                  For Pharmacy Admin Tracking Only    Intervention Detail: Adherence Monitorin and Dose Adjustment: 2, reason: Therapy Optimization  Total # of Interventions Recommended: 4  Total # of Interventions Accepted: 4  Time Spent (min): 20    TOAN ShahPh., 12/3/2024,8:48 AM

## 2024-12-17 ENCOUNTER — ANTI-COAG VISIT (OUTPATIENT)
Age: 74
End: 2024-12-17
Payer: MEDICARE

## 2024-12-17 VITALS
HEART RATE: 74 BPM | DIASTOLIC BLOOD PRESSURE: 101 MMHG | WEIGHT: 210.6 LBS | SYSTOLIC BLOOD PRESSURE: 141 MMHG | BODY MASS INDEX: 26.32 KG/M2

## 2024-12-17 DIAGNOSIS — I48.91 ATRIAL FIBRILLATION, UNSPECIFIED TYPE (HCC): Primary | ICD-10-CM

## 2024-12-17 LAB
INTERNATIONAL NORMALIZATION RATIO, POC: 2.1
PROTHROMBIN TIME, POC: 0

## 2024-12-17 PROCEDURE — 85610 PROTHROMBIN TIME: CPT

## 2024-12-17 PROCEDURE — 99211 OFF/OP EST MAY X REQ PHY/QHP: CPT

## 2024-12-17 NOTE — PROGRESS NOTES
[facility]  [facility]UL[facility]    Referring Provider: Dr Steiner     GOAL INR: 2.0 - 3.0     TODAY'S INR: 2.1     WARFARIN Dosage: continue 3 mg daily      INR (no units)   Date Value   2024 1.5   2024 2.3   2024 1.6   2024 2.6   2024 2.5   2024 1.9   2023 3.9     INR,(POC) (no units)   Date Value   2024 2.1   2024 1.1   10/29/2024 1.6   2024 2.5   2024 1.1       Hemoglobin   Date Value Ref Range Status   10/28/2024 14.8 13.5 - 17.5 g/dL Final     Hematocrit   Date Value Ref Range Status   10/28/2024 41.6 41.0 - 53.0 % Final     ALT   Date Value Ref Range Status   10/28/2024 20 5 - 41 U/L Final     AST   Date Value Ref Range Status   10/28/2024 19 <40 U/L Final       Medication changes:  None      Notes:    Fingerstick INR drawn per clinic protocol. Patient states no visible blood in urine and no black tarry stool. Denies any missed doses of warfarin. No change in other maintenance medications or in diet. Will continue current dosage of 3 mg warfarin daily as noted on his dosing calendar and then recheck INR in 6 weeks. Patient acknowledges working in consult agreement with pharmacist as referred by his/her physician.                  For Pharmacy Admin Tracking Only    Intervention Detail: Adherence Monitorin and Vaccine Recommended/Administered  Total # of Interventions Recommended: 2  Total # of Interventions Accepted: 2  Time Spent (min): 45      Andrew Calderón RPH, PharmD

## 2024-12-23 ENCOUNTER — HOSPITAL ENCOUNTER (OUTPATIENT)
Age: 74
Discharge: HOME | End: 2024-12-23
Payer: MEDICARE

## 2024-12-23 ENCOUNTER — TELEPHONE (OUTPATIENT)
Dept: FAMILY MEDICINE CLINIC | Age: 74
End: 2024-12-23

## 2024-12-23 ENCOUNTER — HOSPITAL ENCOUNTER (OUTPATIENT)
Age: 74
Discharge: HOME | End: 2024-12-25
Payer: MEDICARE

## 2024-12-23 ENCOUNTER — OFFICE VISIT (OUTPATIENT)
Dept: FAMILY MEDICINE CLINIC | Age: 74
End: 2024-12-23
Payer: MEDICARE

## 2024-12-23 ENCOUNTER — HOSPITAL ENCOUNTER (OUTPATIENT)
Dept: GENERAL RADIOLOGY | Age: 74
Discharge: HOME | End: 2024-12-25
Payer: MEDICARE

## 2024-12-23 VITALS — HEART RATE: 62 BPM | DIASTOLIC BLOOD PRESSURE: 60 MMHG | OXYGEN SATURATION: 98 % | SYSTOLIC BLOOD PRESSURE: 110 MMHG

## 2024-12-23 DIAGNOSIS — R06.02 SOB (SHORTNESS OF BREATH): ICD-10-CM

## 2024-12-23 DIAGNOSIS — R53.83 FATIGUE, UNSPECIFIED TYPE: ICD-10-CM

## 2024-12-23 DIAGNOSIS — I48.0 PAROXYSMAL ATRIAL FIBRILLATION (HCC): ICD-10-CM

## 2024-12-23 DIAGNOSIS — I48.21 PERMANENT ATRIAL FIBRILLATION (HCC): ICD-10-CM

## 2024-12-23 DIAGNOSIS — I48.21 PERMANENT ATRIAL FIBRILLATION (HCC): Primary | ICD-10-CM

## 2024-12-23 DIAGNOSIS — I10 ESSENTIAL HYPERTENSION: ICD-10-CM

## 2024-12-23 DIAGNOSIS — E78.00 PURE HYPERCHOLESTEROLEMIA: ICD-10-CM

## 2024-12-23 DIAGNOSIS — I25.10 CORONARY ARTERY DISEASE DUE TO LIPID RICH PLAQUE: ICD-10-CM

## 2024-12-23 DIAGNOSIS — E55.9 VITAMIN D DEFICIENCY: ICD-10-CM

## 2024-12-23 DIAGNOSIS — I25.83 CORONARY ARTERY DISEASE DUE TO LIPID RICH PLAQUE: ICD-10-CM

## 2024-12-23 LAB
ALBUMIN SERPL-MCNC: 3.7 G/DL (ref 3.5–5.2)
ALP SERPL-CCNC: 74 U/L (ref 40–129)
ALT SERPL-CCNC: 36 U/L (ref 5–41)
ANION GAP SERPL CALCULATED.3IONS-SCNC: 13 MMOL/L (ref 9–17)
AST SERPL-CCNC: 27 U/L
BASOPHILS # BLD: 0.06 K/UL (ref 0–0.2)
BASOPHILS NFR BLD: 1 % (ref 0–2)
BILIRUB SERPL-MCNC: 1.6 MG/DL (ref 0.3–1.2)
BUN SERPL-MCNC: 31 MG/DL (ref 8–23)
BUN/CREAT SERPL: 18 (ref 9–20)
CALCIUM SERPL-MCNC: 9.1 MG/DL (ref 8.6–10.4)
CHLORIDE SERPL-SCNC: 104 MMOL/L (ref 98–107)
CO2 SERPL-SCNC: 26 MMOL/L (ref 20–31)
CREAT SERPL-MCNC: 1.7 MG/DL (ref 0.7–1.2)
EKG Q-T INTERVAL: 402 MS
EKG QRS DURATION: 120 MS
EKG QTC CALCULATION (BAZETT): 515 MS
EKG R AXIS: 40 DEGREES
EKG T AXIS: -162 DEGREES
EKG VENTRICULAR RATE: 99 BPM
EOSINOPHIL # BLD: 0.08 K/UL (ref 0–0.4)
EOSINOPHILS RELATIVE PERCENT: 1 % (ref 0–5)
ERYTHROCYTE [DISTWIDTH] IN BLOOD BY AUTOMATED COUNT: 13.4 % (ref 12.1–15.2)
GFR, ESTIMATED: 42 ML/MIN/1.73M2
GLUCOSE SERPL-MCNC: 94 MG/DL (ref 70–99)
HCT VFR BLD AUTO: 48.6 % (ref 41–53)
HGB BLD-MCNC: 17.1 G/DL (ref 13.5–17.5)
IMM GRANULOCYTES # BLD AUTO: 0.01 K/UL (ref 0–0.3)
IMM GRANULOCYTES NFR BLD: 0 % (ref 0–5)
LYMPHOCYTES NFR BLD: 2.18 K/UL (ref 1–4.8)
LYMPHOCYTES RELATIVE PERCENT: 26 % (ref 13–44)
MAGNESIUM SERPL-MCNC: 2.2 MG/DL (ref 1.6–2.6)
MCH RBC QN AUTO: 32.9 PG (ref 26–34)
MCHC RBC AUTO-ENTMCNC: 35.2 G/DL (ref 31–37)
MCV RBC AUTO: 93.6 FL (ref 80–100)
MONOCYTES NFR BLD: 0.92 K/UL (ref 0–1)
MONOCYTES NFR BLD: 11 % (ref 5–9)
NEUTROPHILS NFR BLD: 61 % (ref 39–75)
NEUTS SEG NFR BLD: 5.16 K/UL (ref 2.1–6.5)
PLATELET # BLD AUTO: 190 K/UL (ref 140–450)
PMV BLD AUTO: 11.6 FL (ref 6–12)
POTASSIUM SERPL-SCNC: 4.1 MMOL/L (ref 3.7–5.3)
PROT SERPL-MCNC: 6.1 G/DL (ref 6.4–8.3)
RBC # BLD AUTO: 5.19 M/UL (ref 4.5–5.9)
SODIUM SERPL-SCNC: 143 MMOL/L (ref 135–144)
WBC OTHER # BLD: 8.4 K/UL (ref 3.5–11)

## 2024-12-23 PROCEDURE — 1159F MED LIST DOCD IN RCRD: CPT | Performed by: NURSE PRACTITIONER

## 2024-12-23 PROCEDURE — 3074F SYST BP LT 130 MM HG: CPT | Performed by: NURSE PRACTITIONER

## 2024-12-23 PROCEDURE — 80053 COMPREHEN METABOLIC PANEL: CPT

## 2024-12-23 PROCEDURE — 85025 COMPLETE CBC W/AUTO DIFF WBC: CPT

## 2024-12-23 PROCEDURE — 99213 OFFICE O/P EST LOW 20 MIN: CPT | Performed by: NURSE PRACTITIONER

## 2024-12-23 PROCEDURE — 83735 ASSAY OF MAGNESIUM: CPT

## 2024-12-23 PROCEDURE — 1160F RVW MEDS BY RX/DR IN RCRD: CPT | Performed by: NURSE PRACTITIONER

## 2024-12-23 PROCEDURE — 3078F DIAST BP <80 MM HG: CPT | Performed by: NURSE PRACTITIONER

## 2024-12-23 PROCEDURE — 36415 COLL VENOUS BLD VENIPUNCTURE: CPT

## 2024-12-23 PROCEDURE — 93010 ELECTROCARDIOGRAM REPORT: CPT | Performed by: INTERNAL MEDICINE

## 2024-12-23 PROCEDURE — 93005 ELECTROCARDIOGRAM TRACING: CPT

## 2024-12-23 PROCEDURE — 1123F ACP DISCUSS/DSCN MKR DOCD: CPT | Performed by: NURSE PRACTITIONER

## 2024-12-23 PROCEDURE — 71046 X-RAY EXAM CHEST 2 VIEWS: CPT

## 2024-12-23 ASSESSMENT — ENCOUNTER SYMPTOMS
NAUSEA: 0
SHORTNESS OF BREATH: 1
VOMITING: 0
COUGH: 1
DIARRHEA: 0

## 2024-12-23 NOTE — PROGRESS NOTES
HPI Notes    Name: Logan Ventura  : 1950         Chief Complaint:     Chief Complaint   Patient presents with    Fatigue     Patient feeling fatigue, tires very easily, balance is off, pt states he feels like he's in a fog.  ongoing for about 2 weeks.     Shortness of Breath     States very easily SOB.        History of Present Illness:        HPI  Pt is a 73 yo male that reports for 2 week onset of weakness, fatigue, shortness of breath. Pt states that he used to be able to walk to his mailbox and back without difficulty but now cannot. Has had a minor cough that started two weeks ago also. Denies difficulty laying flat. Denies need medications.       Past Medical History:     Past Medical History:   Diagnosis Date    Arthritis     LEFT ANKLE    Carotid stenosis 2017    CHF (congestive heart failure) (HCC)     Coronary artery disease due to lipid rich plaque 8/10/2015    Essential hypertension 8/10/2015    Hyperlipidemia 8/10/2015      Reviewed all health maintenance requirements and ordered appropriate tests  Health Maintenance Due   Topic Date Due    Pneumococcal 65+ years Vaccine (1 of 2 - PCV) Never done    Hepatitis C screen  Never done    DTaP/Tdap/Td vaccine (1 - Tdap) Never done    Shingles vaccine (1 of 2) Never done    Respiratory Syncytial Virus (RSV) Pregnant or age 60 yrs+ (1 - Risk 60-74 years 1-dose series) Never done    Flu vaccine (1) Never done    COVID-19 Vaccine ( - - season) Never done       Past Surgical History:     Past Surgical History:   Procedure Laterality Date    CARDIAC CATHETERIZATION Left 2015    Dr. Steiner-[facility]-Severe CAD, Diffuse 90% disease in the proximal left anterior descending coronary artery.  80% disease in the distal circumflex.  80% disease in small nondominant right coronary artery.  Borderline normal LV function, ejection fraction of 50%    CARDIAC CATHETERIZATION Left 2018    Dr. Steiner @ [facility] --CAD, Small nondominant right

## 2024-12-24 ENCOUNTER — ANTI-COAG VISIT (OUTPATIENT)
Age: 74
End: 2024-12-24
Payer: MEDICARE

## 2024-12-24 ENCOUNTER — OFFICE VISIT (OUTPATIENT)
Dept: CARDIOLOGY CLINIC | Age: 74
End: 2024-12-24
Payer: MEDICARE

## 2024-12-24 ENCOUNTER — HOSPITAL ENCOUNTER (OUTPATIENT)
Age: 74
Setting detail: SPECIMEN
Discharge: HOME | End: 2024-12-24
Payer: MEDICARE

## 2024-12-24 ENCOUNTER — TELEPHONE (OUTPATIENT)
Dept: FAMILY MEDICINE CLINIC | Age: 74
End: 2024-12-24

## 2024-12-24 ENCOUNTER — HOSPITAL ENCOUNTER (OUTPATIENT)
Dept: CT IMAGING | Age: 74
Discharge: HOME | End: 2024-12-26
Attending: INTERNAL MEDICINE
Payer: MEDICARE

## 2024-12-24 VITALS — HEART RATE: 90 BPM | SYSTOLIC BLOOD PRESSURE: 110 MMHG | DIASTOLIC BLOOD PRESSURE: 70 MMHG

## 2024-12-24 DIAGNOSIS — E78.00 PURE HYPERCHOLESTEROLEMIA: ICD-10-CM

## 2024-12-24 DIAGNOSIS — I48.21 PERMANENT ATRIAL FIBRILLATION (HCC): Primary | ICD-10-CM

## 2024-12-24 DIAGNOSIS — R06.02 SOB (SHORTNESS OF BREATH): Primary | ICD-10-CM

## 2024-12-24 DIAGNOSIS — R06.02 SOB (SHORTNESS OF BREATH): ICD-10-CM

## 2024-12-24 LAB
CHOLEST SERPL-MCNC: 170 MG/DL (ref 0–199)
CHOLESTEROL/HDL RATIO: 5.7
HDLC SERPL-MCNC: 30 MG/DL
INTERNATIONAL NORMALIZATION RATIO, POC: 2.8
LDLC SERPL CALC-MCNC: 113 MG/DL (ref 0–100)
PROTHROMBIN TIME, POC: 0
TRIGL SERPL-MCNC: 135 MG/DL
TROPONIN I SERPL HS-MCNC: 22 NG/L (ref 0–22)
VLDLC SERPL CALC-MCNC: 27 MG/DL (ref 1–30)

## 2024-12-24 PROCEDURE — 80061 LIPID PANEL: CPT

## 2024-12-24 PROCEDURE — 6360000004 HC RX CONTRAST MEDICATION: Performed by: INTERNAL MEDICINE

## 2024-12-24 PROCEDURE — 3074F SYST BP LT 130 MM HG: CPT | Performed by: INTERNAL MEDICINE

## 2024-12-24 PROCEDURE — 85610 PROTHROMBIN TIME: CPT

## 2024-12-24 PROCEDURE — 71275 CT ANGIOGRAPHY CHEST: CPT

## 2024-12-24 PROCEDURE — 1123F ACP DISCUSS/DSCN MKR DOCD: CPT | Performed by: INTERNAL MEDICINE

## 2024-12-24 PROCEDURE — 84484 ASSAY OF TROPONIN QUANT: CPT

## 2024-12-24 PROCEDURE — 99212 OFFICE O/P EST SF 10 MIN: CPT

## 2024-12-24 PROCEDURE — 3078F DIAST BP <80 MM HG: CPT | Performed by: INTERNAL MEDICINE

## 2024-12-24 PROCEDURE — 99214 OFFICE O/P EST MOD 30 MIN: CPT | Performed by: INTERNAL MEDICINE

## 2024-12-24 RX ORDER — ENOXAPARIN SODIUM 100 MG/ML
INJECTION SUBCUTANEOUS
Qty: 10 ML | Refills: 0 | OUTPATIENT
Start: 2024-12-24

## 2024-12-24 RX ORDER — IOPAMIDOL 755 MG/ML
100 INJECTION, SOLUTION INTRAVASCULAR
Status: COMPLETED | OUTPATIENT
Start: 2024-12-24 | End: 2024-12-24

## 2024-12-24 RX ADMIN — IOPAMIDOL 100 ML: 755 INJECTION, SOLUTION INTRAVENOUS at 09:50

## 2024-12-24 NOTE — PATIENT INSTRUCTIONS
HOLD LASIX    HOLD ALL NON RX MEDS    HOLD COUMADIN STARTING NOW    WILL SET UP FOR CTA CHEST WITH CONTRAST     WILL GET BMP ON THURSDAY ( NON FASTING LABS)    FOLLOW UP ON MONDAY AT 8    WILL TRY TO SET UP FOR CATH ON   DEC 29     HOLD COUMADIN STARTING  NOW WITH BRIDGING

## 2024-12-24 NOTE — PROGRESS NOTES
Ov Dr. Steiner for sob  C/o sob  Saw Lyle yesterday EKG done  Had dental work one week ago   With Eboni Chris - that day   Sx started   Became sob - sleepy   Very fatigued   No chest pain or tightness  Sob is much worse with activity   No edema  No syncope   Feels fine sitting   Bedside echo done   Pt states he is taking \"all my meds once a day\"  Pt states one week he was so busy he took   No meds x one week - \"several months ago\"    HOLD LASIX    HOLD ALL NON RX MEDS    HOLD COUMADIN STARTING NOW    WILL SET UP FOR CTA CHEST WITH CONTRAST     WILL GET BMP ON THURSDAY ( NON FASTING LABS)    FOLLOW UP ON MONDAY AT 8    WILL TRY TO SET UP FOR CATH ON   DEC 29     HOLD COUMADIN STARTING  NOW WITH BRIDGING     
2015.  Bypass surgery, Kathryn 10, 2015.  Catheterization, January 24, 2018.  Broken ankle years ago.  Severe hyperlipidemia, untreated; takes red yeast rice.  Severe arthritis of the left wrist with surgery done by Dr. Dorsey, February 15, 2023.    FAMILY HISTORY:  Significant for CAD. Mother had MI.  Father had MI.    SOCIAL HISTORY:  He is 74 years old, , 3 children. Youngest son, Jax, working full-time.  Daughter, 48, lives with her boyfriend, a .  He does not have contact with his son, Carlos, who is a medic.  He is moving his family and himself into Logan' original 2-story farm home. He has been  for 19 years.  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  He does not smoke or drink alcohol.    REVIEW OF SYSTEMS:  Cardiac as above. Other systems reviewed including constitutional, eyes, ears, nose, throat, cardiovascular, respiratory, GI, , musculoskeletal, integumentary, and neurologic are negative as described above. No weight loss or weight gain.  No change in bowel habits or blood in stool.  No fevers, sweats, or chills.    PHYSICAL EXAMINATION:  VITAL SIGNS:  Blood pressure was 110/70 with a heart rate of 90 and irregular, respiratory rate 18, O2 saturation 94%.  GENERAL:  He is a pleasant 74-year-old gentleman, who was in no acute distress.  He denied pain. He was oriented to person, place, and time.  He was dyspneic with any activity.  SKIN:  No unusual skin changes.  HEENT: Pupils are equally round and reactive to light and accommodation.  Extraocular movements are intact.  Mucous membranes are dry.  NECK:  No JVD. Good carotid pulses.  CARDIOVASCULAR:  S1, S2 normal.  No S3 or S4.  A grade 3/6 systolic blowing-type murmur at the apex.  Did not have a pericardial friction rub.  LUNGS:  Fairly clear to auscultation and percussion.  ABDOMEN:  Soft, nontender.  Good bowel sounds.  The aorta was not enlarged.  EXTREMITIES:  Good femoral pulses. He had

## 2024-12-24 NOTE — TELEPHONE ENCOUNTER
----- Message from Arnol Cadena DNP sent at 12/24/2024  6:59 AM EST -----  Please let pt know that his kidneys are showing decreased function. Stop ALL NSAIDs and ALL supplements. Stop Lasix. Continue Coreg, Coumadin, Imdur, Hydralazine, ASA. These are the only things I want him to take. Recheck BMP on 12/27/24.

## 2024-12-24 NOTE — PROGRESS NOTES
[facility]  ANTICOAGULATION    Referring Provider: Dr Steiner     GOAL INR: 2.0 - 3.0     TODAY'S INR: 2.8     WARFARIN Dosage: HOLD warfarin for the next 4 days prior to heart cath on 12/27/2024, bridge with Lovenox injections as given on bridging calendar      INR (no units)   Date Value   08/02/2024 1.5   06/21/2024 2.3   05/24/2024 1.6   04/05/2024 2.6   02/23/2024 2.5   01/26/2024 1.9   12/29/2023 3.9     INR,(POC) (no units)   Date Value   12/17/2024 2.1   12/03/2024 1.1   10/29/2024 1.6   09/17/2024 2.5   09/03/2024 1.1       Hemoglobin   Date Value Ref Range Status   12/23/2024 17.1 13.5 - 17.5 g/dL Final     Hematocrit   Date Value Ref Range Status   12/23/2024 48.6 41.0 - 53.0 % Final     ALT   Date Value Ref Range Status   12/23/2024 36 5 - 41 U/L Final     AST   Date Value Ref Range Status   12/23/2024 27 <40 U/L Final       Medication changes:  None      Notes:    Fingerstick INR drawn per clinic protocol. Patient states no visible blood in urine and no black tarry stool. Denies any missed doses of warfarin. Henri just came from an appointment with Dr. Steiner and states that he has been having significant SOB and is scheduled to have a heart catheterization this coming Friday, 12/27/2024. He has not taken any warfarin today and is supposed to hold warfarin for the next 4 days (12/24 through 12/27). He will be bridged with Lovenox 100 mg injections every 12 hours starting 12/25, continuing until the early AM of 12/26/2024 (24 hours prior to scheduled procedure time). Thereafter, he will resume Lovenox injections every 12 hours and warfarin post-procedure on 12/28 and take 6 mg warfarin for the first 3 doses (12/28, 12/29, and 12/30) as noted on his dosing calendar. We will check his INR in the clinic on Tuesday, 12/31/2024 to ensure his INR is back above 1.9 before stopping the Lovenox injections. No change in other maintenance medications or in diet. I

## 2024-12-24 NOTE — PATIENT INSTRUCTIONS
Continue to follow the LOVENOX and warfarin dosing calendar as instructed   Continue to monitor urine and stool for signs and symptoms of bleeding.   Please notify the clinic of any medication changes.   Please remember to bring all medications (both prescription and OTC) to your next visit. Kindly notify the clinic if you are unable to make to your next appointment.

## 2024-12-26 ENCOUNTER — TELEPHONE (OUTPATIENT)
Dept: CARDIOLOGY CLINIC | Age: 74
End: 2024-12-26

## 2024-12-26 ENCOUNTER — HOSPITAL ENCOUNTER (OUTPATIENT)
Age: 74
Discharge: HOME | End: 2024-12-26
Payer: MEDICARE

## 2024-12-26 DIAGNOSIS — R06.02 SOB (SHORTNESS OF BREATH): ICD-10-CM

## 2024-12-26 LAB
ANION GAP SERPL CALCULATED.3IONS-SCNC: 10 MMOL/L (ref 9–17)
BUN SERPL-MCNC: 27 MG/DL (ref 8–23)
BUN/CREAT SERPL: 18 (ref 9–20)
CALCIUM SERPL-MCNC: 8.8 MG/DL (ref 8.6–10.4)
CHLORIDE SERPL-SCNC: 111 MMOL/L (ref 98–107)
CO2 SERPL-SCNC: 26 MMOL/L (ref 20–31)
CREAT SERPL-MCNC: 1.5 MG/DL (ref 0.7–1.2)
GFR, ESTIMATED: 49 ML/MIN/1.73M2
GLUCOSE SERPL-MCNC: 100 MG/DL (ref 70–99)
POTASSIUM SERPL-SCNC: 4.5 MMOL/L (ref 3.7–5.3)
SODIUM SERPL-SCNC: 147 MMOL/L (ref 135–144)

## 2024-12-26 PROCEDURE — 36415 COLL VENOUS BLD VENIPUNCTURE: CPT

## 2024-12-26 PROCEDURE — 80048 BASIC METABOLIC PNL TOTAL CA: CPT

## 2024-12-26 RX ORDER — ENOXAPARIN SODIUM 100 MG/ML
INJECTION SUBCUTANEOUS
Qty: 10 EACH | Refills: 1 | OUTPATIENT
Start: 2024-12-26

## 2024-12-26 NOTE — TELEPHONE ENCOUNTER
Pt called informed insurance still pending   Will attempt to do cath on Jan 3rd.   Per DR Steiner to continue lovenox   Talked with Noah from coumadin clinic.   Also talked with Yvette sister informed  Arrival time for Rhett 3 is 9am

## 2024-12-26 NOTE — PROGRESS NOTES
Received a call from Laverne Sol RN in Dr. Steiner's office notifying our clinic that this patient's heart catheterization had been rescheduled from 12/27/2024 to 1/3/2025 so he will need to stay off his warfarin at this time and continue bridging with therapeutic Enoxaparin 100 mg injections every 12 hours until the AM of 1/2/2025 pre-procedure. Post-procedure on 1/4/2025, he will resume warfarin AND Lovenox injections every 12 hours and take 6 mg warfarin for the first 3 doses (1/4, 1/5, and 1/6) and we will check his INR in the clinic on Tuesday, 1/7/2025 to ensure his INR is back above 1.9 before stopping the Lovenox injections.     Called an additional Rx for Lovenox 100 mg injections, #10, 1 refill, to physician refill line at [facility], 12/26/2024 at 1345, TM

## 2025-01-01 ENCOUNTER — HOSPITAL ENCOUNTER (EMERGENCY)
Age: 75
Discharge: HOME | End: 2025-01-01
Attending: EMERGENCY MEDICINE
Payer: MEDICARE

## 2025-01-01 VITALS
OXYGEN SATURATION: 96 % | DIASTOLIC BLOOD PRESSURE: 75 MMHG | TEMPERATURE: 98.2 F | SYSTOLIC BLOOD PRESSURE: 130 MMHG | BODY MASS INDEX: 26.32 KG/M2 | HEART RATE: 83 BPM | RESPIRATION RATE: 16 BRPM | WEIGHT: 210.6 LBS

## 2025-01-01 DIAGNOSIS — R04.0 EPISTAXIS: Primary | ICD-10-CM

## 2025-01-01 DIAGNOSIS — Z79.01 ANTICOAGULATED: ICD-10-CM

## 2025-01-01 DIAGNOSIS — Z86.79 HISTORY OF ATRIAL FIBRILLATION: ICD-10-CM

## 2025-01-01 LAB
ANION GAP SERPL CALCULATED.3IONS-SCNC: 9 MMOL/L (ref 9–17)
BASOPHILS # BLD: 0.03 K/UL (ref 0–0.2)
BASOPHILS NFR BLD: 0 % (ref 0–2)
BUN SERPL-MCNC: 48 MG/DL (ref 8–23)
BUN/CREAT SERPL: 40 (ref 9–20)
CALCIUM SERPL-MCNC: 8.2 MG/DL (ref 8.6–10.4)
CHLORIDE SERPL-SCNC: 107 MMOL/L (ref 98–107)
CO2 SERPL-SCNC: 25 MMOL/L (ref 20–31)
CREAT SERPL-MCNC: 1.2 MG/DL (ref 0.7–1.2)
EOSINOPHIL # BLD: 0.11 K/UL (ref 0–0.4)
EOSINOPHILS RELATIVE PERCENT: 2 % (ref 0–5)
ERYTHROCYTE [DISTWIDTH] IN BLOOD BY AUTOMATED COUNT: 13.4 % (ref 12.1–15.2)
GFR, ESTIMATED: 63 ML/MIN/1.73M2
GLUCOSE SERPL-MCNC: 98 MG/DL (ref 70–99)
HCT VFR BLD AUTO: 41.1 % (ref 41–53)
HGB BLD-MCNC: 14.6 G/DL (ref 13.5–17.5)
IMM GRANULOCYTES # BLD AUTO: 0.01 K/UL (ref 0–0.3)
IMM GRANULOCYTES NFR BLD: 0 % (ref 0–5)
INR PPP: 1.3
LYMPHOCYTES NFR BLD: 1.58 K/UL (ref 1–4.8)
LYMPHOCYTES RELATIVE PERCENT: 23 % (ref 13–44)
MCH RBC QN AUTO: 33 PG (ref 26–34)
MCHC RBC AUTO-ENTMCNC: 35.5 G/DL (ref 31–37)
MCV RBC AUTO: 93 FL (ref 80–100)
MONOCYTES NFR BLD: 0.81 K/UL (ref 0–1)
MONOCYTES NFR BLD: 12 % (ref 5–9)
NEUTROPHILS NFR BLD: 63 % (ref 39–75)
NEUTS SEG NFR BLD: 4.21 K/UL (ref 2.1–6.5)
PARTIAL THROMBOPLASTIN TIME: 42.5 SEC (ref 23.9–33.8)
PLATELET # BLD AUTO: 143 K/UL (ref 140–450)
PMV BLD AUTO: 11.3 FL (ref 6–12)
POTASSIUM SERPL-SCNC: 4 MMOL/L (ref 3.7–5.3)
PROTHROMBIN TIME: 16.4 SEC (ref 11.5–14.2)
RBC # BLD AUTO: 4.42 M/UL (ref 4.5–5.9)
SODIUM SERPL-SCNC: 141 MMOL/L (ref 135–144)
WBC OTHER # BLD: 6.8 K/UL (ref 3.5–11)

## 2025-01-01 PROCEDURE — 80048 BASIC METABOLIC PNL TOTAL CA: CPT

## 2025-01-01 PROCEDURE — 85025 COMPLETE CBC W/AUTO DIFF WBC: CPT

## 2025-01-01 PROCEDURE — 93005 ELECTROCARDIOGRAM TRACING: CPT | Performed by: EMERGENCY MEDICINE

## 2025-01-01 PROCEDURE — 85610 PROTHROMBIN TIME: CPT

## 2025-01-01 PROCEDURE — 85730 THROMBOPLASTIN TIME PARTIAL: CPT

## 2025-01-01 PROCEDURE — 99284 EMERGENCY DEPT VISIT MOD MDM: CPT

## 2025-01-01 PROCEDURE — 36415 COLL VENOUS BLD VENIPUNCTURE: CPT

## 2025-01-01 PROCEDURE — 30903 CONTROL OF NOSEBLEED: CPT

## 2025-01-01 RX ORDER — AMOXICILLIN 500 MG/1
500 CAPSULE ORAL 3 TIMES DAILY
Qty: 15 CAPSULE | Refills: 0 | Status: SHIPPED | OUTPATIENT
Start: 2025-01-01 | End: 2025-01-06

## 2025-01-01 ASSESSMENT — PAIN - FUNCTIONAL ASSESSMENT: PAIN_FUNCTIONAL_ASSESSMENT: NONE - DENIES PAIN

## 2025-01-01 ASSESSMENT — LIFESTYLE VARIABLES
HOW MANY STANDARD DRINKS CONTAINING ALCOHOL DO YOU HAVE ON A TYPICAL DAY: PATIENT DOES NOT DRINK
HOW OFTEN DO YOU HAVE A DRINK CONTAINING ALCOHOL: NEVER

## 2025-01-01 NOTE — ED PROVIDER NOTES
taking: Reported on 12/24/2024)      l-arginine 500 MG capsule Take 1 capsule by mouth daily (Patient not taking: Reported on 12/24/2024)      potassium chloride (KLOR-CON M10) 10 MEQ extended release tablet TAKE 1 TABLET TWICE A DAY (Patient not taking: Reported on 12/24/2024) 180 tablet 3    b complex vitamins capsule Take 1 capsule by mouth daily (Patient not taking: Reported on 12/24/2024)      Capsicum, Cayenne, (CAYENNE PO) Take 1 capsule by mouth daily (Patient not taking: Reported on 12/24/2024)      Echinacea 500 MG CAPS Take 500 mg by mouth daily (Patient not taking: Reported on 12/24/2024)      Ashwagandha 500 MG CAPS Take 500 mg by mouth daily (Patient not taking: Reported on 12/24/2024)      Sulema Root 500 MG CAPS Take 500 mg by mouth daily (Patient not taking: Reported on 12/24/2024)      Ginseng 250 MG CAPS Take 250 mg by mouth daily (Patient not taking: Reported on 12/24/2024)      Melatonin 3 MG TBDP Take 1 tablet by mouth nightly as needed (Patient not taking: Reported on 12/24/2024)      Red Yeast Rice Extract 600 MG TABS Take 1 tablet by mouth daily (Patient not taking: Reported on 12/24/2024)      Cholecalciferol (VITAMIN D3) 2000 UNITS CAPS Take 1 capsule by mouth daily (Patient not taking: Reported on 12/24/2024)      Ascorbic Acid (VITAMIN C) 500 MG tablet Take 1 tablet by mouth daily (Patient not taking: Reported on 12/24/2024)      vitamin E 1000 UNITS capsule Take 1 capsule by mouth daily (Patient not taking: Reported on 12/24/2024)         ALLERGIES    Allergies   Allergen Reactions    Lisinopril      Swollen tongue       FAMILY HISTORY    Family History   Problem Relation Age of Onset    Heart Disease Mother     High Cholesterol Mother     Heart Disease Father     High Cholesterol Father        SOCIAL HISTORY    Social History     Socioeconomic History    Marital status: Single     Spouse name: None    Number of children: None    Years of education: None    Highest education level: None

## 2025-01-02 ENCOUNTER — TELEPHONE (OUTPATIENT)
Age: 75
End: 2025-01-02

## 2025-01-02 ENCOUNTER — HOSPITAL ENCOUNTER (EMERGENCY)
Age: 75
Discharge: HOME | End: 2025-01-02
Attending: EMERGENCY MEDICINE
Payer: MEDICARE

## 2025-01-02 ENCOUNTER — TELEPHONE (OUTPATIENT)
Dept: CARDIOLOGY CLINIC | Age: 75
End: 2025-01-02

## 2025-01-02 VITALS
RESPIRATION RATE: 18 BRPM | OXYGEN SATURATION: 98 % | DIASTOLIC BLOOD PRESSURE: 91 MMHG | TEMPERATURE: 96.9 F | HEART RATE: 85 BPM | BODY MASS INDEX: 25.39 KG/M2 | HEIGHT: 75 IN | SYSTOLIC BLOOD PRESSURE: 120 MMHG | WEIGHT: 204.2 LBS

## 2025-01-02 DIAGNOSIS — R04.0 EPISTAXIS: Primary | ICD-10-CM

## 2025-01-02 LAB
EKG Q-T INTERVAL: 398 MS
EKG QRS DURATION: 120 MS
EKG QTC CALCULATION (BAZETT): 543 MS
EKG R AXIS: 82 DEGREES
EKG T AXIS: -102 DEGREES
EKG VENTRICULAR RATE: 112 BPM

## 2025-01-02 PROCEDURE — 93010 ELECTROCARDIOGRAM REPORT: CPT | Performed by: INTERNAL MEDICINE

## 2025-01-02 PROCEDURE — 99282 EMERGENCY DEPT VISIT SF MDM: CPT

## 2025-01-02 ASSESSMENT — PAIN - FUNCTIONAL ASSESSMENT: PAIN_FUNCTIONAL_ASSESSMENT: NONE - DENIES PAIN

## 2025-01-02 NOTE — TELEPHONE ENCOUNTER
Bhargav pt brother called stating he was in ER yesterday   D/t nose bleeds and lovenox was stopped  ? What to do since cath is tomorrow  PER DR URIARTE will hold Loevenox until after procedure  Bhargav verbalized understanding  Dean @ coumadin clinic also notified

## 2025-01-02 NOTE — ED PROVIDER NOTES
[facility]  EMERGENCY DEPARTMENT  eMERGENCY dEPARTMENT eNCOUnter      Pt Name: Logan Ventura  MRN: [id number]  Birthdate 1950  Date of evaluation: 1/2/2025  Provider: Logan Bates MD    CHIEF COMPLAINT       Chief Complaint   Patient presents with    Other     Removal of nasal tube from epistaxis yesterday.       Patient is a 74-year-old male who presents to the emergency department to have his Rhino Rocket removed.  He had nosebleeding yesterday and had a Rhino Rocket placed and has been doing well since.  He denies other symptoms.        Nursing Notes were reviewed.    REVIEW OF SYSTEMS    (2-9 systems for level 4, 10 or more for level 5)     Review of Systems    Except as noted above the remainder of the review of systems was reviewed and negative.       PAST MEDICAL HISTORY     Past Medical History:   Diagnosis Date    Arthritis     LEFT ANKLE    Carotid stenosis 5/2/2017    CHF (congestive heart failure) (HCC)     Coronary artery disease due to lipid rich plaque 8/10/2015    Essential hypertension 8/10/2015    Hyperlipidemia 8/10/2015         SURGICAL HISTORY       Past Surgical History:   Procedure Laterality Date    CARDIAC CATHETERIZATION Left 05/06/2015    Dr. Steiner-[facility]-Severe CAD, Diffuse 90% disease in the proximal left anterior descending coronary artery.  80% disease in the distal circumflex.  80% disease in small nondominant right coronary artery.  Borderline normal LV function, ejection fraction of 50%    CARDIAC CATHETERIZATION Left 01/24/2018    Dr. Steiner @ [facility] --CAD, Small nondominant right coronary artery with 90% disease.  severe 95% disease in the native left anterior descending.  50% disease at the ostium of a very large circumflex with a fractional flow reserve of 0.98.  Occluded patent ductus arteriosus, which arises from the circumflex.  Occluded saphenous vein graft to the patent ductus arteriosus.      CAROTID ENDARTERECTOMY Right     CATARACT EXTRACTION, BILATERAL  Bilateral     fall 2023    CORONARY ARTERY BYPASS GRAFT  06/10/2015    Dr Andrade x2    EYE SURGERY  2023    cataract    KNEE SURGERY Left 10/14/2015    Lt knee bursa repair    NOSE SURGERY      ROTATOR CUFF REPAIR      right    WRIST SURGERY Left     left wrist replacement         ALLERGIES     Lisinopril    FAMILY HISTORY       Family History   Problem Relation Age of Onset    Heart Disease Mother     High Cholesterol Mother     Heart Disease Father     High Cholesterol Father           SOCIAL HISTORY       Social History     Socioeconomic History    Marital status: Single     Spouse name: None    Number of children: None    Years of education: None    Highest education level: None   Tobacco Use    Smoking status: Never     Passive exposure: Never    Smokeless tobacco: Never   Vaping Use    Vaping status: Never Used   Substance and Sexual Activity    Alcohol use: Yes     Alcohol/week: 2.0 standard drinks of alcohol     Types: 1 Shots of liquor, 1 Glasses of wine per week     Comment: occas.    Drug use: No    Sexual activity: Defer     Social Determinants of Health     Financial Resource Strain: Low Risk  (4/16/2024)    Overall Financial Resource Strain (CARDIA)     Difficulty of Paying Living Expenses: Not hard at all   Food Insecurity: No Food Insecurity (4/16/2024)    Hunger Vital Sign     Worried About Running Out of Food in the Last Year: Never true     Ran Out of Food in the Last Year: Never true   Transportation Needs: Unknown (4/16/2024)    PRAPARE - Transportation     Lack of Transportation (Non-Medical): No   Physical Activity: Sufficiently Active (10/23/2024)    Exercise Vital Sign     Days of Exercise per Week: 4 days     Minutes of Exercise per Session: 40 min   Housing Stability: Unknown (4/16/2024)    Housing Stability Vital Sign     Unstable Housing in the Last Year: No           PHYSICAL EXAM    (up to 7 for level 4, 8 ormore for level 5)     ED Triage Vitals   BP Systolic BP Percentile Diastolic

## 2025-01-02 NOTE — TELEPHONE ENCOUNTER
Call from Dania @ Dr Steiner's office stating that Henri was seen in the ER yesterday due to epistaxis. While his cardiac cath is still on for tomorrow, his Lovenox bridge was discontinued at this time. He is to restart warfarin after his procedure as instructed but will not be restarting Lovenox. Jacob Peñaloza, PharmD 1/2/2025 12:49 PM

## 2025-01-03 ENCOUNTER — PROCEDURE VISIT (OUTPATIENT)
Dept: CARDIOLOGY CLINIC | Age: 75
End: 2025-01-03

## 2025-01-03 DIAGNOSIS — E78.00 PURE HYPERCHOLESTEROLEMIA: ICD-10-CM

## 2025-01-03 DIAGNOSIS — R06.02 SOB (SHORTNESS OF BREATH): Primary | ICD-10-CM

## 2025-01-03 DIAGNOSIS — I10 ESSENTIAL HYPERTENSION: ICD-10-CM

## 2025-01-06 RX ORDER — ATORVASTATIN CALCIUM 40 MG/1
40 TABLET, FILM COATED ORAL DAILY
Qty: 90 TABLET | Refills: 3 | Status: SHIPPED | OUTPATIENT
Start: 2025-01-06

## 2025-01-07 ENCOUNTER — ANTI-COAG VISIT (OUTPATIENT)
Age: 75
End: 2025-01-07
Payer: MEDICARE

## 2025-01-07 ENCOUNTER — TELEPHONE (OUTPATIENT)
Dept: CARDIOLOGY CLINIC | Age: 75
End: 2025-01-07

## 2025-01-07 VITALS
WEIGHT: 206 LBS | HEART RATE: 52 BPM | SYSTOLIC BLOOD PRESSURE: 121 MMHG | BODY MASS INDEX: 25.75 KG/M2 | DIASTOLIC BLOOD PRESSURE: 87 MMHG

## 2025-01-07 DIAGNOSIS — Z98.61 POST PTCA: Primary | ICD-10-CM

## 2025-01-07 DIAGNOSIS — I48.21 PERMANENT ATRIAL FIBRILLATION (HCC): Primary | ICD-10-CM

## 2025-01-07 LAB
INTERNATIONAL NORMALIZATION RATIO, POC: 2
PROTHROMBIN TIME, POC: 0

## 2025-01-07 PROCEDURE — 85610 PROTHROMBIN TIME: CPT

## 2025-01-07 PROCEDURE — 99211 OFF/OP EST MAY X REQ PHY/QHP: CPT

## 2025-01-07 RX ORDER — WARFARIN SODIUM 3 MG/1
1 TABLET ORAL DAILY
COMMUNITY

## 2025-01-07 RX ORDER — CLOPIDOGREL BISULFATE 75 MG/1
75 TABLET ORAL DAILY
COMMUNITY
Start: 2025-01-03 | End: 2025-02-02

## 2025-01-07 NOTE — PROGRESS NOTES
[facility]  ANTICOAGULATION    Referring Provider: Dr Steiner     GOAL INR: 2.0 - 3.0     TODAY'S INR: 2.0     WARFARIN Dosage: decrease warfarin dosing to 3 mg daily       INR (no units)   Date Value   01/01/2025 1.3   08/02/2024 1.5   06/21/2024 2.3   05/24/2024 1.6   04/05/2024 2.6   02/23/2024 2.5   01/26/2024 1.9     INR,(POC) (no units)   Date Value   01/07/2025 2.0   12/24/2024 2.8   12/17/2024 2.1   12/03/2024 1.1   10/29/2024 1.6   09/17/2024 2.5   09/03/2024 1.1       Hemoglobin   Date Value Ref Range Status   01/01/2025 14.6 13.5 - 17.5 g/dL Final     Hematocrit   Date Value Ref Range Status   01/01/2025 41.1 41.0 - 53.0 % Final     ALT   Date Value Ref Range Status   12/23/2024 36 5 - 41 U/L Final     AST   Date Value Ref Range Status   12/23/2024 27 <40 U/L Final       Medication changes:  Amoxicillin 500 mg TID x 5 days (started 1/1/2025)  Plavix 75 mg daily (started 1/3/2025)     Notes:    Fingerstick INR drawn per clinic protocol. Patient states no visible blood in urine and no black tarry stool. Denies any missed doses of warfarin. As discussed at his last appointment, Henri had been having significant SOB and was scheduled to have a heart catheterization per Dr. Steiner on 12/27/2024. He had been taken off his warfarin on 12/24/2024 and started bridging with Lovenox 100 mg injections every 12 hours on 12/25. His procedure was then cancelled and rescheduled for 1/3/2025 due to insurance coverage so he remained off his warfarin and on Lovenox injections for an extended period of time. Henri says he developed a nosebleed and came to the ER on 1/1/2025. His left nostril was cauterized with silver nitrate, but had recurrent bleeding so a rhino rocket was placed for 24 hours and he was instructed to stop using the Lovenox injections at that time. He was also given an Rx for Amoxicillin 500 mg TID x 5 days. Henri had his heart cath on 1/3/2025 per Dr. Steiner

## 2025-01-13 ENCOUNTER — HOSPITAL ENCOUNTER (EMERGENCY)
Age: 75
Discharge: HOME | End: 2025-01-13
Attending: EMERGENCY MEDICINE
Payer: MEDICARE

## 2025-01-13 VITALS
HEART RATE: 103 BPM | WEIGHT: 208 LBS | RESPIRATION RATE: 17 BRPM | BODY MASS INDEX: 25.86 KG/M2 | OXYGEN SATURATION: 95 % | TEMPERATURE: 96.8 F | HEIGHT: 75 IN | SYSTOLIC BLOOD PRESSURE: 115 MMHG | DIASTOLIC BLOOD PRESSURE: 62 MMHG

## 2025-01-13 DIAGNOSIS — Z79.01 ANTICOAGULATED: ICD-10-CM

## 2025-01-13 DIAGNOSIS — R04.0 EPISTAXIS: Primary | ICD-10-CM

## 2025-01-13 LAB
ANION GAP SERPL CALCULATED.3IONS-SCNC: 11 MMOL/L (ref 9–17)
BASOPHILS # BLD: 0.05 K/UL (ref 0–0.2)
BASOPHILS NFR BLD: 1 % (ref 0–2)
BUN SERPL-MCNC: 45 MG/DL (ref 8–23)
BUN/CREAT SERPL: 35 (ref 9–20)
CALCIUM SERPL-MCNC: 8.5 MG/DL (ref 8.6–10.4)
CHLORIDE SERPL-SCNC: 109 MMOL/L (ref 98–107)
CO2 SERPL-SCNC: 23 MMOL/L (ref 20–31)
CREAT SERPL-MCNC: 1.3 MG/DL (ref 0.7–1.2)
EOSINOPHIL # BLD: 0.16 K/UL (ref 0–0.4)
EOSINOPHILS RELATIVE PERCENT: 2 % (ref 0–5)
ERYTHROCYTE [DISTWIDTH] IN BLOOD BY AUTOMATED COUNT: 13.1 % (ref 12.1–15.2)
GFR, ESTIMATED: 58 ML/MIN/1.73M2
GLUCOSE SERPL-MCNC: 88 MG/DL (ref 70–99)
HCT VFR BLD AUTO: 39.7 % (ref 41–53)
HGB BLD-MCNC: 13.6 G/DL (ref 13.5–17.5)
IMM GRANULOCYTES # BLD AUTO: 0.01 K/UL (ref 0–0.3)
IMM GRANULOCYTES NFR BLD: 0 % (ref 0–5)
INR PPP: 3.1
LYMPHOCYTES NFR BLD: 2.59 K/UL (ref 1–4.8)
LYMPHOCYTES RELATIVE PERCENT: 36 % (ref 13–44)
MCH RBC QN AUTO: 32.2 PG (ref 26–34)
MCHC RBC AUTO-ENTMCNC: 34.3 G/DL (ref 31–37)
MCV RBC AUTO: 94.1 FL (ref 80–100)
MONOCYTES NFR BLD: 0.6 K/UL (ref 0–1)
MONOCYTES NFR BLD: 8 % (ref 5–9)
NEUTROPHILS NFR BLD: 53 % (ref 39–75)
NEUTS SEG NFR BLD: 3.89 K/UL (ref 2.1–6.5)
PLATELET # BLD AUTO: 158 K/UL (ref 140–450)
PMV BLD AUTO: 11.2 FL (ref 6–12)
POTASSIUM SERPL-SCNC: 4.1 MMOL/L (ref 3.7–5.3)
PROTHROMBIN TIME: 30.9 SEC (ref 11.5–14.2)
RBC # BLD AUTO: 4.22 M/UL (ref 4.5–5.9)
SODIUM SERPL-SCNC: 143 MMOL/L (ref 135–144)
WBC OTHER # BLD: 7.3 K/UL (ref 3.5–11)

## 2025-01-13 PROCEDURE — 93005 ELECTROCARDIOGRAM TRACING: CPT | Performed by: EMERGENCY MEDICINE

## 2025-01-13 PROCEDURE — 30903 CONTROL OF NOSEBLEED: CPT

## 2025-01-13 PROCEDURE — 85025 COMPLETE CBC W/AUTO DIFF WBC: CPT

## 2025-01-13 PROCEDURE — 85610 PROTHROMBIN TIME: CPT

## 2025-01-13 PROCEDURE — 99284 EMERGENCY DEPT VISIT MOD MDM: CPT

## 2025-01-13 PROCEDURE — 80048 BASIC METABOLIC PNL TOTAL CA: CPT

## 2025-01-13 NOTE — ED PROVIDER NOTES
EMERGENCY DEPARTMENT ENCOUNTER      CHIEF COMPLAINT    Chief Complaint   Patient presents with    Epistaxis       HPI    Logan Ventura is a 74 y.o. male who presentsto ED with nosebleed.  Patient is anticoagulated on Coumadin.  Patient has history of coronary disease with atrial fibrillation.  Patient has recent stent placement.  Patient denies chest pain.  Patient had controlled the bleeding at home with nasal packing and nasal clamp    PAST MEDICAL HISTORY    Past Medical History:   Diagnosis Date    Arthritis     LEFT ANKLE    Atrial fibrillation (HCC)     Carotid stenosis 05/02/2017    CHF (congestive heart failure) (HCC)     Coronary artery disease due to lipid rich plaque 08/10/2015    Essential hypertension 08/10/2015    Hyperlipidemia 08/10/2015       SURGICAL HISTORY    Past Surgical History:   Procedure Laterality Date    CARDIAC CATHETERIZATION Left 05/06/2015    Dr. Steiner-[facility]-Severe CAD, Diffuse 90% disease in the proximal left anterior descending coronary artery.  80% disease in the distal circumflex.  80% disease in small nondominant right coronary artery.  Borderline normal LV function, ejection fraction of 50%    CARDIAC CATHETERIZATION Left 01/24/2018    Dr. Steiner @ [facility] --CAD, Small nondominant right coronary artery with 90% disease.  severe 95% disease in the native left anterior descending.  50% disease at the ostium of a very large circumflex with a fractional flow reserve of 0.98.  Occluded patent ductus arteriosus, which arises from the circumflex.  Occluded saphenous vein graft to the patent ductus arteriosus.      CARDIAC CATHETERIZATION  01/03/2025    CAROTID ENDARTERECTOMY Right     CATARACT EXTRACTION, BILATERAL Bilateral     fall 2023    CORONARY ARTERY BYPASS GRAFT  06/10/2015    Dr Andrade x2    EYE SURGERY  2023    cataract    KNEE SURGERY Left 10/14/2015    Lt knee bursa repair    NOSE SURGERY      ROTATOR CUFF REPAIR      right    WRIST SURGERY Left     left wrist

## 2025-01-13 NOTE — PROGRESS NOTES
Dr. Marcelino cauterized nosebleed at bedside. No current bleeding, patient tolerated without issue.

## 2025-01-13 NOTE — ED TRIAGE NOTES
Patient arrived to ED with c/o nose bleed intermittently for 24 hours. Patient takes coumadin at home. Additionally, patient c/o black stools, dyspnea on exertion, and lightheadedness.

## 2025-01-14 LAB
EKG Q-T INTERVAL: 416 MS
EKG QRS DURATION: 120 MS
EKG QTC CALCULATION (BAZETT): 533 MS
EKG R AXIS: 12 DEGREES
EKG T AXIS: -145 DEGREES
EKG VENTRICULAR RATE: 99 BPM

## 2025-01-14 PROCEDURE — 93010 ELECTROCARDIOGRAM REPORT: CPT | Performed by: INTERNAL MEDICINE

## 2025-01-15 ENCOUNTER — ANTI-COAG VISIT (OUTPATIENT)
Age: 75
End: 2025-01-15
Payer: MEDICARE

## 2025-01-15 VITALS
HEART RATE: 50 BPM | WEIGHT: 199 LBS | BODY MASS INDEX: 24.87 KG/M2 | DIASTOLIC BLOOD PRESSURE: 74 MMHG | SYSTOLIC BLOOD PRESSURE: 112 MMHG

## 2025-01-15 LAB
INTERNATIONAL NORMALIZATION RATIO, POC: 1.8
PROTHROMBIN TIME, POC: 0

## 2025-01-15 PROCEDURE — 85610 PROTHROMBIN TIME: CPT | Performed by: PHARMACIST

## 2025-01-15 PROCEDURE — 99211 OFF/OP EST MAY X REQ PHY/QHP: CPT | Performed by: PHARMACIST

## 2025-01-15 RX ORDER — CARVEDILOL 6.25 MG/1
6.25 TABLET ORAL 2 TIMES DAILY
Qty: 180 TABLET | Refills: 3 | Status: SHIPPED | OUTPATIENT
Start: 2025-01-15

## 2025-01-15 NOTE — PROGRESS NOTES
[facility]ication [facility]  ANTICOAGULATION    Referring Provider: Dr Agustin Steiner     GOAL INR: 2.0-3.0     TODAY'S INR: 1.8     WARFARIN Dosage: 4.5 mg x 1, then resume 3 mg daily    INR (no units)   Date Value   2025 3.1   2025 1.3   2024 1.5   2024 2.3   2024 1.6   2024 2.6   2024 2.5     INR,(POC) (no units)   Date Value   01/15/2025 1.8   2025 2.0   2024 2.8   2024 2.1   2024 1.1   10/29/2024 1.6   2024 2.5       Hemoglobin   Date Value Ref Range Status   2025 13.6 13.5 - 17.5 g/dL Final     Hematocrit   Date Value Ref Range Status   2025 39.7 (L) 41.0 - 53.0 % Final     ALT   Date Value Ref Range Status   2024 36 5 - 41 U/L Final     AST   Date Value Ref Range Status   2024 27 <40 U/L Final       Medication changes:  None     Notes:    Fingerstick INR drawn per clinic protocol. Patient states he had an ER visit  for epistaxis and dark stool and was instructed to hold his warfarin dose that evening by the ER physician. The dark stool, which is believed to be due to swallowing blood during his bloody nose, has stopped since his epistaxis was stopped. Confirms no visible blood in urine or falls and denies any other missed or extra doses of warfarin. He is still off of all vitamins and supplements and has had no change in other medications or diet. INR is a bit subtherapeutic so will order 4.5 mg tonight before resuming 3 mg daily and will recheck INR in 2 weeks. Patient acknowledges working in consult agreement with pharmacist as referred by his/her physician.    For Pharmacy Admin Tracking Only    Intervention Detail: Adherence Monitorin and Dose Adjustment: 1, reason: Therapy Optimization  Total # of Interventions Recommended: 2  Total # of Interventions Accepted: 2  Time Spent (min): 30    Jacob Peñaloza, PharmD 1/15/2025 9:22 AM     
normal...

## 2025-01-20 ENCOUNTER — TELEPHONE (OUTPATIENT)
Dept: CARDIAC REHAB | Age: 75
End: 2025-01-20

## 2025-01-20 NOTE — TELEPHONE ENCOUNTER
Followed up with pt regarding getting scheduled for initial assessment for cardiac rehab. Pt was originally scheduled on 1/13/25 to get started but ended up in the er for an uncontrolled nose bleed so his appointment with cardiac rehab was cancelled. Pt stated he is not ready to get started in cardiac rehab as he wants to follow up with his doctor first to get the all clear. He was also concerned about his $35 co pay that would be required through his insurance per session for cardiac rehab. Pt is going to call us to get scheduled in the future when he is ready to do so.

## 2025-01-29 ENCOUNTER — ANTI-COAG VISIT (OUTPATIENT)
Age: 75
End: 2025-01-29
Payer: MEDICARE

## 2025-01-29 ENCOUNTER — TELEPHONE (OUTPATIENT)
Dept: CARDIOLOGY CLINIC | Age: 75
End: 2025-01-29

## 2025-01-29 ENCOUNTER — HOSPITAL ENCOUNTER (EMERGENCY)
Age: 75
Discharge: HOME | End: 2025-01-29
Attending: EMERGENCY MEDICINE
Payer: MEDICARE

## 2025-01-29 VITALS
BODY MASS INDEX: 25.65 KG/M2 | DIASTOLIC BLOOD PRESSURE: 199 MMHG | WEIGHT: 205.2 LBS | HEART RATE: 38 BPM | SYSTOLIC BLOOD PRESSURE: 224 MMHG

## 2025-01-29 VITALS
RESPIRATION RATE: 20 BRPM | OXYGEN SATURATION: 97 % | HEART RATE: 78 BPM | TEMPERATURE: 98.4 F | SYSTOLIC BLOOD PRESSURE: 134 MMHG | WEIGHT: 207.6 LBS | HEIGHT: 75 IN | DIASTOLIC BLOOD PRESSURE: 92 MMHG | BODY MASS INDEX: 25.81 KG/M2

## 2025-01-29 DIAGNOSIS — I95.9 HYPOTENSION, UNSPECIFIED HYPOTENSION TYPE: Primary | ICD-10-CM

## 2025-01-29 DIAGNOSIS — I48.21 PERMANENT ATRIAL FIBRILLATION (HCC): Primary | ICD-10-CM

## 2025-01-29 LAB
INTERNATIONAL NORMALIZATION RATIO, POC: 1.5
PROTHROMBIN TIME, POC: 0

## 2025-01-29 PROCEDURE — 99284 EMERGENCY DEPT VISIT MOD MDM: CPT

## 2025-01-29 PROCEDURE — 85610 PROTHROMBIN TIME: CPT | Performed by: PHARMACIST

## 2025-01-29 PROCEDURE — 99211 OFF/OP EST MAY X REQ PHY/QHP: CPT | Performed by: PHARMACIST

## 2025-01-29 PROCEDURE — 96360 HYDRATION IV INFUSION INIT: CPT

## 2025-01-29 PROCEDURE — 2580000003 HC RX 258: Performed by: EMERGENCY MEDICINE

## 2025-01-29 RX ORDER — DIGOXIN 0.25 MG/ML
500 INJECTION INTRAMUSCULAR; INTRAVENOUS ONCE
Status: DISCONTINUED | OUTPATIENT
Start: 2025-01-29 | End: 2025-01-29

## 2025-01-29 RX ORDER — 0.9 % SODIUM CHLORIDE 0.9 %
1000 INTRAVENOUS SOLUTION INTRAVENOUS ONCE
Status: COMPLETED | OUTPATIENT
Start: 2025-01-29 | End: 2025-01-29

## 2025-01-29 RX ADMIN — SODIUM CHLORIDE 1000 ML: 9 INJECTION, SOLUTION INTRAVENOUS at 13:33

## 2025-01-29 ASSESSMENT — PAIN - FUNCTIONAL ASSESSMENT
PAIN_FUNCTIONAL_ASSESSMENT: NONE - DENIES PAIN
PAIN_FUNCTIONAL_ASSESSMENT: NONE - DENIES PAIN

## 2025-01-29 NOTE — ED PROVIDER NOTES
EMERGENCY DEPARTMENT ENCOUNTER      CHIEF COMPLAINT    Chief Complaint   Patient presents with    Hypertension     Patient arrives from the coumadin clinic due to his blood pressure fluctuating.          NADIYA Ventura is a 74 y.o. male who presents to the emergency room for evaluation of elevated blood pressure.  He has a history of high blood pressure and takes hydralazine at home.  He was in the Coumadin clinic when his blood pressure was found to be elevated.   He complains of mild lightheadedness.  He was given a dose of hydralazine and his blood pressure dropped to 224/199 10 minutes thereafter and then to 79/50 fifteen minutes after that.  He denies any chest pain or shortness of breath.  He reports mild lightheadedness.      PAST MEDICAL HISTORY    Past Medical History:   Diagnosis Date    Arthritis     LEFT ANKLE    Atrial fibrillation (HCC)     Carotid stenosis 05/02/2017    CHF (congestive heart failure) ([facility])     Coronary artery disease due to lipid rich plaque 08/10/2015    Essential hypertension 08/10/2015    Hyperlipidemia 08/10/2015       SURGICAL HISTORY    Past Surgical History:   Procedure Laterality Date    CARDIAC CATHETERIZATION Left 05/06/2015    Dr. Steiner-[facility]-Severe CAD, Diffuse 90% disease in the proximal left anterior descending coronary artery.  80% disease in the distal circumflex.  80% disease in small nondominant right coronary artery.  Borderline normal LV function, ejection fraction of 50%    CARDIAC CATHETERIZATION Left 01/24/2018    Dr. Steiner @ [facility] --CAD, Small nondominant right coronary artery with 90% disease.  severe 95% disease in the native left anterior descending.  50% disease at the ostium of a very large circumflex with a fractional flow reserve of 0.98.  Occluded patent ductus arteriosus, which arises from the circumflex.  Occluded saphenous vein graft to the patent ductus arteriosus.      CARDIAC CATHETERIZATION  01/03/2025    CAROTID

## 2025-01-29 NOTE — PROGRESS NOTES
[facility]  ANTICO[facility]ULATION    Referring Provider: Dr Agustin Steiner     GOAL INR: 2.0-3.0     TODAY'S INR: 1.5     WARFARIN Dosage: 4.5 mg x 3, then resume 3 mg daily    INR (no units)   Date Value   01/13/2025 3.1   01/01/2025 1.3   08/02/2024 1.5   06/21/2024 2.3   05/24/2024 1.6   04/05/2024 2.6   02/23/2024 2.5     INR,(POC) (no units)   Date Value   01/29/2025 1.5   01/15/2025 1.8   01/07/2025 2.0   12/24/2024 2.8   12/17/2024 2.1   12/03/2024 1.1   10/29/2024 1.6       Hemoglobin   Date Value Ref Range Status   01/13/2025 13.6 13.5 - 17.5 g/dL Final     Hematocrit   Date Value Ref Range Status   01/13/2025 39.7 (L) 41.0 - 53.0 % Final     ALT   Date Value Ref Range Status   12/23/2024 36 5 - 41 U/L Final     AST   Date Value Ref Range Status   12/23/2024 27 <40 U/L Final       Medication changes:  None     Notes:    Fingerstick INR drawn per clinic protocol. Patient states no visible blood in urine, black tarry stool, falls or ER visits. Admits to missing several days in a row last week (W-F) but denies any other missed or extra doses of warfarin. No change in other maintenance medications or in diet. INR is subtherapeutic so will have patient take 4.5 mg x 3 days before resuming 3 mg daily and will recheck INR in 2 weeks. Patient's BP was taken near the end of the visit and was high at 216/177, pulse of 103. The patient had taken his morning blood pressure pills but not his hydralazine so had him take a full tablet as opposed to half. A recheck 10 minutes later was 224/199 with a pulse of 38 so Dr Steiner's office was called. 3 more checks were completed while on hold and all 3 resulted 70-87/48-53 with pulses 47-81. Dr Steiner recommended patient be seen in ER so escorted patient down and provided all 5 readings to Dr Echeverria. Henri was given fluids and released. See ER notes for additional details. Patient acknowledges working in consult agreement with

## 2025-01-29 NOTE — PATIENT INSTRUCTIONS
.Continue to monitor urine and stool for signs and symptoms of bleeding.   Please notify the clinic of any medication changes.   Please remember to bring all medications (both prescription and OTC) to your next visit.   Kindly notify the clinic if you are unable to make to your next appointment.   Follow warfarin dosing instructions on dosing calendar provided.

## 2025-01-29 NOTE — TELEPHONE ENCOUNTER
Dean from the coumadin clinic called.     Pt bp today at clinic was 217/177 manual and 224/199 with recheck    Sx - Increase in Sob since stent, loss of energy, dizziness when standing    Dean did have pt take a full hydralazine instead of half today. Did already take med.

## 2025-01-29 NOTE — DISCHARGE INSTRUCTIONS
Call for an appointment to see your cardiologist in 1 week.  Until that time take your blood pressure 1 time a day during different times of the day and keep a log.  Return to ER for worsening symptoms or if you develop other symptoms you feel the need to be seen for.

## 2025-02-03 ENCOUNTER — TELEPHONE (OUTPATIENT)
Dept: CARDIOLOGY CLINIC | Age: 75
End: 2025-02-03

## 2025-02-03 NOTE — TELEPHONE ENCOUNTER
Patient called to report that he has been having SOB when coming down stairs and also when walks out to his exercise barn. He has to sit and rest for a bit.  If he just sits around all day and doesn't do anything than he feels fine.  His BP and pulse has also been up and down. Did not have accurate readings because he kept taking every 15 minutes this morning.  Told patient to start keeping BP/Pulse log and bring with him to his appt next week with Dr Steiner. Told him to try to capture different times of the day.  Told patient if SOB worsens or he develops any CP/pressure to come to the ED.

## 2025-02-05 ENCOUNTER — TELEPHONE (OUTPATIENT)
Dept: CARDIOLOGY CLINIC | Age: 75
End: 2025-02-05

## 2025-02-05 NOTE — TELEPHONE ENCOUNTER
Called pt to confirm appointment.    States that he wonders if all the problems he has before his stent could be caused by the flu or rsv.     States that his friends that have had stents felt better the next day but he didn't.    Asked pt if he would like to discuss this further with Dr at his appointment. Pt would like a note in to

## 2025-02-10 RX ORDER — POTASSIUM CHLORIDE 750 MG/1
TABLET, EXTENDED RELEASE ORAL
Qty: 180 TABLET | Refills: 3 | Status: SHIPPED | OUTPATIENT
Start: 2025-02-10

## 2025-02-12 ENCOUNTER — HOSPITAL ENCOUNTER (OUTPATIENT)
Age: 75
Discharge: HOME | End: 2025-02-12
Payer: MEDICARE

## 2025-02-12 ENCOUNTER — OFFICE VISIT (OUTPATIENT)
Dept: CARDIOLOGY CLINIC | Age: 75
End: 2025-02-12

## 2025-02-12 ENCOUNTER — HOSPITAL ENCOUNTER (OUTPATIENT)
Dept: GENERAL RADIOLOGY | Age: 75
Discharge: HOME | End: 2025-02-14
Payer: MEDICARE

## 2025-02-12 ENCOUNTER — ANTI-COAG VISIT (OUTPATIENT)
Age: 75
End: 2025-02-12
Payer: MEDICARE

## 2025-02-12 ENCOUNTER — HOSPITAL ENCOUNTER (OUTPATIENT)
Age: 75
Discharge: HOME | End: 2025-02-14
Payer: MEDICARE

## 2025-02-12 VITALS — DIASTOLIC BLOOD PRESSURE: 60 MMHG | SYSTOLIC BLOOD PRESSURE: 102 MMHG | OXYGEN SATURATION: 98 % | HEART RATE: 89 BPM

## 2025-02-12 VITALS
DIASTOLIC BLOOD PRESSURE: 70 MMHG | BODY MASS INDEX: 25.75 KG/M2 | SYSTOLIC BLOOD PRESSURE: 107 MMHG | WEIGHT: 206 LBS | HEART RATE: 75 BPM

## 2025-02-12 DIAGNOSIS — R06.02 SHORTNESS OF BREATH: Primary | ICD-10-CM

## 2025-02-12 DIAGNOSIS — R07.9 CHEST PAIN, UNSPECIFIED TYPE: ICD-10-CM

## 2025-02-12 DIAGNOSIS — R06.02 SHORTNESS OF BREATH: ICD-10-CM

## 2025-02-12 DIAGNOSIS — I48.0 PAROXYSMAL ATRIAL FIBRILLATION (HCC): ICD-10-CM

## 2025-02-12 DIAGNOSIS — R06.02 SOB (SHORTNESS OF BREATH): ICD-10-CM

## 2025-02-12 LAB
ALBUMIN SERPL-MCNC: 3.6 G/DL (ref 3.5–5.2)
ALBUMIN/GLOB SERPL: 1.6 {RATIO} (ref 1–2.5)
ALP SERPL-CCNC: 93 U/L (ref 40–129)
ALT SERPL-CCNC: 26 U/L (ref 5–41)
ANION GAP SERPL CALCULATED.3IONS-SCNC: 12 MMOL/L (ref 9–17)
AST SERPL-CCNC: 21 U/L
BASOPHILS # BLD: 0.06 K/UL (ref 0–0.2)
BASOPHILS NFR BLD: 1 % (ref 0–2)
BILIRUB SERPL-MCNC: 1.2 MG/DL (ref 0.3–1.2)
BUN SERPL-MCNC: 31 MG/DL (ref 8–23)
CALCIUM SERPL-MCNC: 8.9 MG/DL (ref 8.6–10.4)
CHLORIDE SERPL-SCNC: 108 MMOL/L (ref 98–107)
CO2 SERPL-SCNC: 23 MMOL/L (ref 20–31)
CREAT SERPL-MCNC: 1.5 MG/DL (ref 0.7–1.2)
EKG Q-T INTERVAL: 406 MS
EKG QRS DURATION: 120 MS
EKG QTC CALCULATION (BAZETT): 526 MS
EKG R AXIS: 24 DEGREES
EKG T AXIS: -151 DEGREES
EKG VENTRICULAR RATE: 101 BPM
EOSINOPHIL # BLD: 0.07 K/UL (ref 0–0.4)
EOSINOPHILS RELATIVE PERCENT: 1 % (ref 0–5)
ERYTHROCYTE [DISTWIDTH] IN BLOOD BY AUTOMATED COUNT: 15 % (ref 12.1–15.2)
GFR, ESTIMATED: 49 ML/MIN/1.73M2
GLUCOSE SERPL-MCNC: 98 MG/DL (ref 70–99)
HCT VFR BLD AUTO: 31.8 % (ref 41–53)
HGB BLD-MCNC: 10.6 G/DL (ref 13.5–17.5)
IMM GRANULOCYTES # BLD AUTO: 0.01 K/UL (ref 0–0.3)
IMM GRANULOCYTES NFR BLD: 0 % (ref 0–5)
INTERNATIONAL NORMALIZATION RATIO, POC: 4.2
LYMPHOCYTES NFR BLD: 1.66 K/UL (ref 1–4.8)
LYMPHOCYTES RELATIVE PERCENT: 21 % (ref 13–44)
MCH RBC QN AUTO: 29.2 PG (ref 26–34)
MCHC RBC AUTO-ENTMCNC: 33.3 G/DL (ref 31–37)
MCV RBC AUTO: 87.6 FL (ref 80–100)
MONOCYTES NFR BLD: 0.68 K/UL (ref 0–1)
MONOCYTES NFR BLD: 9 % (ref 5–9)
NEUTROPHILS NFR BLD: 68 % (ref 39–75)
NEUTS SEG NFR BLD: 5.4 K/UL (ref 2.1–6.5)
PLATELET # BLD AUTO: 287 K/UL (ref 140–450)
PMV BLD AUTO: 10.3 FL (ref 6–12)
POTASSIUM SERPL-SCNC: 5 MMOL/L (ref 3.7–5.3)
PROT SERPL-MCNC: 5.8 G/DL (ref 6.4–8.3)
PROTHROMBIN TIME, POC: 0
RBC # BLD AUTO: 3.63 M/UL (ref 4.5–5.9)
SODIUM SERPL-SCNC: 143 MMOL/L (ref 135–144)
TSH SERPL DL<=0.05 MIU/L-ACNC: 0.8 UIU/ML (ref 0.27–4.2)
WBC OTHER # BLD: 7.9 K/UL (ref 3.5–11)

## 2025-02-12 PROCEDURE — 80053 COMPREHEN METABOLIC PANEL: CPT

## 2025-02-12 PROCEDURE — 99211 OFF/OP EST MAY X REQ PHY/QHP: CPT | Performed by: PHARMACIST

## 2025-02-12 PROCEDURE — 84443 ASSAY THYROID STIM HORMONE: CPT

## 2025-02-12 PROCEDURE — 71046 X-RAY EXAM CHEST 2 VIEWS: CPT

## 2025-02-12 PROCEDURE — 85610 PROTHROMBIN TIME: CPT | Performed by: PHARMACIST

## 2025-02-12 PROCEDURE — 36415 COLL VENOUS BLD VENIPUNCTURE: CPT

## 2025-02-12 PROCEDURE — 93005 ELECTROCARDIOGRAM TRACING: CPT

## 2025-02-12 PROCEDURE — 85025 COMPLETE CBC W/AUTO DIFF WBC: CPT

## 2025-02-12 RX ORDER — CARVEDILOL 6.25 MG/1
TABLET ORAL
COMMUNITY

## 2025-02-12 RX ORDER — FUROSEMIDE 20 MG/1
20 TABLET ORAL DAILY PRN
COMMUNITY

## 2025-02-12 RX ORDER — HYDRALAZINE HYDROCHLORIDE 25 MG/1
TABLET, FILM COATED ORAL
COMMUNITY

## 2025-02-12 NOTE — PROGRESS NOTES
Ov Dr. Steiner for 6 week f/u   Here with Vita/girlfriend   Post PTCA   Not in cardiac rehab   Pt states he can't afford it   $35/per visit   No walking or exercising at home   Pt states he thinks has/had RSV  Did not go to PCP to be checked  Doing own home remedies   C/o sob and no energy   Stent didn't help sob  02 walking in hallway stayed @97-98    Will STOP IMDUR    Will DECREASE Coreg 6.25 to 1/2 tablet twice a day     Will set up for Echo/lexiscan/7 day E Patch    Will get cbc/cmp/tsh /EKG cxr today     Monitor BP/HR daily - different times of the day -  Report readings back to us in 2 weeks    Follow up in 6 weeks

## 2025-02-12 NOTE — PROGRESS NOTES
[facility]  ANTICOAGULATION    Referring Provider: Dr Agustin Steiner     GOAL INR: 2.0-3.0     TODAY'S INR: 4.2     WARFARIN Dosage: HOLD x 1, then resume 3 mg daily    INR (no units)   Date Value   2025 3.1   2025 1.3   2024 1.5   2024 2.3   2024 1.6   2024 2.6   2024 2.5     INR,(POC) (no units)   Date Value   2025 4.2   2025 1.5   01/15/2025 1.8   2025 2.0   2024 2.8   2024 2.1   2024 1.1       Hemoglobin   Date Value Ref Range Status   2025 13.6 13.5 - 17.5 g/dL Final     Hematocrit   Date Value Ref Range Status   2025 39.7 (L) 41.0 - 53.0 % Final     ALT   Date Value Ref Range Status   2024 36 5 - 41 U/L Final     AST   Date Value Ref Range Status   2024 27 <40 U/L Final       Medication changes:  None     Notes:    Fingerstick INR drawn per clinic protocol. Patient states no visible blood in urine, black tarry stool, falls or ER visits but does advise of a single episode of epistaxis. Admits to missing his dose on  but denies any other missed or extra doses of warfarin. No change in other maintenance medications or in diet. INR is supratherapeutic today so will hold tonight's dose prior to resuming 3 mg daily and will recheck INR in 2 weeks. Patient acknowledges working in consult agreement with pharmacist as referred by his/her physician.    For Pharmacy Admin Tracking Only    Intervention Detail: Adherence Monitorin and Dose Adjustment: 1, reason: Therapy Optimization  Total # of Interventions Recommended: 2  Total # of Interventions Accepted: 2  Time Spent (min): 30    Jacob Peñaloza PharmD 2025 11:02 AM

## 2025-02-12 NOTE — PATIENT INSTRUCTIONS
Will STOP IMDUR    Will DECREASE Coreg 6.25 to 1/2 tablet twice a day     Will set up for Echo/lexiscan/7 day E Patch    Will get cbc/cmp/tsh /EKG cxr today     Monitor BP/HR daily - different times of the day -  Report readings back to us in 2 weeks    Follow up in 6 weeks

## 2025-02-12 NOTE — PROGRESS NOTES
EKG results given to Dr. Reilly office for review. Instructed patient to stop at Dr. Reilly's office before leaving the facility

## 2025-02-25 ENCOUNTER — HOSPITAL ENCOUNTER (OUTPATIENT)
Age: 75
Discharge: HOME | End: 2025-02-27
Attending: INTERNAL MEDICINE
Payer: MEDICARE

## 2025-02-25 ENCOUNTER — HOSPITAL ENCOUNTER (OUTPATIENT)
Dept: NUCLEAR MEDICINE | Age: 75
Discharge: HOME | End: 2025-02-27
Attending: INTERNAL MEDICINE
Payer: MEDICARE

## 2025-02-25 ENCOUNTER — ANTI-COAG VISIT (OUTPATIENT)
Age: 75
End: 2025-02-25
Payer: MEDICARE

## 2025-02-25 VITALS — SYSTOLIC BLOOD PRESSURE: 107 MMHG | HEART RATE: 108 BPM | DIASTOLIC BLOOD PRESSURE: 79 MMHG

## 2025-02-25 DIAGNOSIS — I48.0 PAROXYSMAL ATRIAL FIBRILLATION (HCC): ICD-10-CM

## 2025-02-25 DIAGNOSIS — R06.02 SHORTNESS OF BREATH: ICD-10-CM

## 2025-02-25 DIAGNOSIS — R06.02 SOB (SHORTNESS OF BREATH): ICD-10-CM

## 2025-02-25 DIAGNOSIS — R07.9 CHEST PAIN, UNSPECIFIED TYPE: ICD-10-CM

## 2025-02-25 LAB
INTERNATIONAL NORMALIZATION RATIO, POC: 6.3
PROTHROMBIN TIME, POC: 0

## 2025-02-25 PROCEDURE — 78452 HT MUSCLE IMAGE SPECT MULT: CPT

## 2025-02-25 PROCEDURE — 93017 CV STRESS TEST TRACING ONLY: CPT

## 2025-02-25 PROCEDURE — 3430000000 HC RX DIAGNOSTIC RADIOPHARMACEUTICAL: Performed by: INTERNAL MEDICINE

## 2025-02-25 PROCEDURE — 2500000003 HC RX 250 WO HCPCS: Performed by: INTERNAL MEDICINE

## 2025-02-25 PROCEDURE — 93306 TTE W/DOPPLER COMPLETE: CPT

## 2025-02-25 PROCEDURE — 99211 OFF/OP EST MAY X REQ PHY/QHP: CPT | Performed by: PHARMACIST

## 2025-02-25 PROCEDURE — 6360000002 HC RX W HCPCS: Performed by: INTERNAL MEDICINE

## 2025-02-25 PROCEDURE — 93242 EXT ECG>48HR<7D RECORDING: CPT

## 2025-02-25 PROCEDURE — 85610 PROTHROMBIN TIME: CPT | Performed by: PHARMACIST

## 2025-02-25 PROCEDURE — A9500 TC99M SESTAMIBI: HCPCS | Performed by: INTERNAL MEDICINE

## 2025-02-25 RX ORDER — TETRAKIS(2-METHOXYISOBUTYLISOCYANIDE)COPPER(I) TETRAFLUOROBORATE 1 MG/ML
10 INJECTION, POWDER, LYOPHILIZED, FOR SOLUTION INTRAVENOUS
Status: COMPLETED | OUTPATIENT
Start: 2025-02-25 | End: 2025-02-25

## 2025-02-25 RX ORDER — SODIUM CHLORIDE 0.9 % (FLUSH) 0.9 %
5-40 SYRINGE (ML) INJECTION PRN
Status: ACTIVE | OUTPATIENT
Start: 2025-02-25 | End: 2025-02-25

## 2025-02-25 RX ORDER — AMINOPHYLLINE 25 MG/ML
50 INJECTION, SOLUTION INTRAVENOUS PRN
Status: DISPENSED | OUTPATIENT
Start: 2025-02-25 | End: 2025-02-25

## 2025-02-25 RX ORDER — TETRAKIS(2-METHOXYISOBUTYLISOCYANIDE)COPPER(I) TETRAFLUOROBORATE 1 MG/ML
30 INJECTION, POWDER, LYOPHILIZED, FOR SOLUTION INTRAVENOUS
Status: COMPLETED | OUTPATIENT
Start: 2025-02-25 | End: 2025-02-25

## 2025-02-25 RX ORDER — REGADENOSON 0.08 MG/ML
0.4 INJECTION, SOLUTION INTRAVENOUS
Status: COMPLETED | OUTPATIENT
Start: 2025-02-25 | End: 2025-02-25

## 2025-02-25 RX ADMIN — SODIUM CHLORIDE, PRESERVATIVE FREE 10 ML: 5 INJECTION INTRAVENOUS at 09:22

## 2025-02-25 RX ADMIN — TETRAKIS(2-METHOXYISOBUTYLISOCYANIDE)COPPER(I) TETRAFLUOROBORATE 30 MILLICURIE: 1 INJECTION, POWDER, LYOPHILIZED, FOR SOLUTION INTRAVENOUS at 08:41

## 2025-02-25 RX ADMIN — TETRAKIS(2-METHOXYISOBUTYLISOCYANIDE)COPPER(I) TETRAFLUOROBORATE 10 MILLICURIE: 1 INJECTION, POWDER, LYOPHILIZED, FOR SOLUTION INTRAVENOUS at 08:40

## 2025-02-25 RX ADMIN — REGADENOSON 0.4 MG: 0.08 INJECTION, SOLUTION INTRAVENOUS at 09:22

## 2025-02-25 NOTE — PROGRESS NOTES
[facility]  Medication [facility]  ANTICOAGULATION    Referring Provider: Dr Agustin Steiner     GOAL INR: 2.0-3.0     TODAY'S INR: 6.3     WARFARIN Dosage: HOLD x 3, then decrease to 1.5 mg daily    INR (no units)   Date Value   2025 3.1   2025 1.3   2024 1.5   2024 2.3   2024 1.6   2024 2.6   2024 2.5     INR,(POC) (no units)   Date Value   2025 6.3   2025 4.2   2025 1.5   01/15/2025 1.8   2025 2.0   2024 2.8   2024 2.1       Hemoglobin   Date Value Ref Range Status   2025 10.6 (L) 13.5 - 17.5 g/dL Final     Hematocrit   Date Value Ref Range Status   2025 31.8 (L) 41.0 - 53.0 % Final     ALT   Date Value Ref Range Status   2025 26 5 - 41 U/L Final     AST   Date Value Ref Range Status   2025 21 <40 U/L Final       Medication changes:  None     Notes:    Fingerstick INR drawn per clinic protocol. Patient states no visible blood in urine, black tarry stool, falls or ER visits and denies any missed or extra doses of warfarin. Advises he is still very sOB and has no appetite but confirms no other changes in medication or diet. INR is still very supratherapeutic so will hold his dose for the next 3 mornings and will decrease his dose by half to 1.5 mg daily and will recheck INR in 1.5 weeks. Patient acknowledges working in consult agreement with pharmacist as referred by his/her physician.    For Pharmacy Admin Tracking Only    Intervention Detail: Adherence Monitorin and Dose Adjustment: 1, reason: Therapy Optimization  Total # of Interventions Recommended: 2  Total # of Interventions Accepted: 2  Time Spent (min): 20    Jacob Peñaloza, PharmD 2025 1:47 PM

## 2025-02-25 NOTE — PROGRESS NOTES
0920 - Pt here ambulatory for Lexiscan. Pt , meds, allergies, and history all verbally verified. Denies any questions prior to provedure.    0924 - Lexiscan started. Pt tolerating well so far. Denies any chest pain or SOB.    0926 - Continues to tolerate without complaints.      0928 - No complaints.    0930 - Tolerating procedure without difficulty.     0931 - BP 94/75 - pt has no complaints with lower BP.     0932 - Lexiscan completed. Pt cristina procedure well, snack and beverage provided.

## 2025-02-26 ENCOUNTER — APPOINTMENT (OUTPATIENT)
Age: 75
End: 2025-02-26
Payer: MEDICARE

## 2025-02-26 LAB
ECHO AO ASC DIAM: 2.9 CM
ECHO AO ROOT DIAM: 3.4 CM
ECHO AR MAX VEL PISA: 2.9 M/S
ECHO AV REGURGITANT PHT: 440.5 MS
ECHO EST RA PRESSURE: 8 MMHG
ECHO LA DIAMETER: 5.5 CM
ECHO LA TO AORTIC ROOT RATIO: 1.62
ECHO LA VOL A-L A4C: 192 ML (ref 18–58)
ECHO LA VOL MOD A4C: 178 ML (ref 18–58)
ECHO LV E' LATERAL VELOCITY: 14.07 CM/S
ECHO LV E' SEPTAL VELOCITY: 11.32 CM/S
ECHO LV EDV A4C: 186 ML
ECHO LV EF PHYSICIAN: 25 %
ECHO LV EJECTION FRACTION A4C: 8 %
ECHO LV ESV A4C: 172 ML
ECHO LV FRACTIONAL SHORTENING: -458 % (ref 28–44)
ECHO LV INTERNAL DIMENSION DIASTOLIC: 1.2 CM (ref 4.2–5.9)
ECHO LV INTERNAL DIMENSION SYSTOLIC: 6.7 CM
ECHO LV IVSD: 1.2 CM (ref 0.6–1)
ECHO LV MASS 2D: 690.2 G (ref 88–224)
ECHO LV POSTERIOR WALL DIASTOLIC: 7 CM (ref 0.6–1)
ECHO LV RELATIVE WALL THICKNESS RATIO: 11.67
ECHO LVOT AREA: 3.8 CM2
ECHO LVOT DIAM: 2.2 CM
ECHO PV MAX VELOCITY: 1.2 M/S
ECHO PV PEAK GRADIENT: 6 MMHG
ECHO RIGHT VENTRICULAR SYSTOLIC PRESSURE (RVSP): 37 MMHG
ECHO RV INTERNAL DIMENSION: 3.4 CM
ECHO TV REGURGITANT MAX VELOCITY: 2.71 M/S
ECHO TV REGURGITANT PEAK GRADIENT: 29 MMHG
NUC STRESS EJECTION FRACTION: 20 %
NUC STRESS LV EDV: 232 ML (ref 67–155)
STRESS BASELINE DIAS BP: 78 MMHG
STRESS BASELINE HR: 123 BPM
STRESS BASELINE SYS BP: 113 MMHG
STRESS ESTIMATED WORKLOAD: 1 METS
STRESS PEAK DIAS BP: 109 MMHG
STRESS PEAK SYS BP: 183 MMHG
STRESS PERCENT HR ACHIEVED: 97 %
STRESS POST PEAK HR: 141 BPM
STRESS RATE PRESSURE PRODUCT: ABNORMAL BPM*MMHG
STRESS TARGET HR: 146 BPM

## 2025-03-03 ENCOUNTER — OFFICE VISIT (OUTPATIENT)
Dept: CARDIOLOGY CLINIC | Age: 75
End: 2025-03-03
Payer: MEDICARE

## 2025-03-03 ENCOUNTER — HOSPITAL ENCOUNTER (OUTPATIENT)
Age: 75
Discharge: HOME | End: 2025-03-03
Payer: MEDICARE

## 2025-03-03 VITALS
DIASTOLIC BLOOD PRESSURE: 60 MMHG | BODY MASS INDEX: 27.25 KG/M2 | SYSTOLIC BLOOD PRESSURE: 110 MMHG | WEIGHT: 218 LBS | HEART RATE: 70 BPM | OXYGEN SATURATION: 95 %

## 2025-03-03 DIAGNOSIS — I42.9 CARDIOMYOPATHY, UNSPECIFIED TYPE (HCC): ICD-10-CM

## 2025-03-03 DIAGNOSIS — R06.02 SOB (SHORTNESS OF BREATH): ICD-10-CM

## 2025-03-03 DIAGNOSIS — I42.9 CARDIOMYOPATHY, UNSPECIFIED TYPE (HCC): Primary | ICD-10-CM

## 2025-03-03 LAB
ANION GAP SERPL CALCULATED.3IONS-SCNC: 10 MMOL/L (ref 9–17)
BASOPHILS # BLD: 0.05 K/UL (ref 0–0.2)
BASOPHILS NFR BLD: 1 % (ref 0–2)
BUN SERPL-MCNC: 25 MG/DL (ref 8–23)
CALCIUM SERPL-MCNC: 9 MG/DL (ref 8.6–10.4)
CHLORIDE SERPL-SCNC: 108 MMOL/L (ref 98–107)
CO2 SERPL-SCNC: 27 MMOL/L (ref 20–31)
CREAT SERPL-MCNC: 1.4 MG/DL (ref 0.7–1.2)
EOSINOPHIL # BLD: 0.24 K/UL (ref 0–0.4)
EOSINOPHILS RELATIVE PERCENT: 4 % (ref 0–5)
ERYTHROCYTE [DISTWIDTH] IN BLOOD BY AUTOMATED COUNT: 17.6 % (ref 12.1–15.2)
GFR, ESTIMATED: 53 ML/MIN/1.73M2
GLUCOSE SERPL-MCNC: 104 MG/DL (ref 70–99)
HCT VFR BLD AUTO: 34 % (ref 41–53)
HGB BLD-MCNC: 11 G/DL (ref 13.5–17.5)
IMM GRANULOCYTES # BLD AUTO: 0.01 K/UL (ref 0–0.3)
IMM GRANULOCYTES NFR BLD: 0 % (ref 0–5)
LYMPHOCYTES NFR BLD: 1.5 K/UL (ref 1–4.8)
LYMPHOCYTES RELATIVE PERCENT: 23 % (ref 13–44)
MCH RBC QN AUTO: 26.8 PG (ref 26–34)
MCHC RBC AUTO-ENTMCNC: 32.4 G/DL (ref 31–37)
MCV RBC AUTO: 82.9 FL (ref 80–100)
MONOCYTES NFR BLD: 0.62 K/UL (ref 0–1)
MONOCYTES NFR BLD: 9 % (ref 5–9)
NEUTROPHILS NFR BLD: 63 % (ref 39–75)
NEUTS SEG NFR BLD: 4.26 K/UL (ref 2.1–6.5)
PLATELET # BLD AUTO: 203 K/UL (ref 140–450)
PMV BLD AUTO: 10.7 FL (ref 6–12)
POTASSIUM SERPL-SCNC: 4.3 MMOL/L (ref 3.7–5.3)
RBC # BLD AUTO: 4.1 M/UL (ref 4.5–5.9)
SODIUM SERPL-SCNC: 145 MMOL/L (ref 135–144)
WBC OTHER # BLD: 6.7 K/UL (ref 3.5–11)

## 2025-03-03 PROCEDURE — 36415 COLL VENOUS BLD VENIPUNCTURE: CPT

## 2025-03-03 PROCEDURE — 80048 BASIC METABOLIC PNL TOTAL CA: CPT

## 2025-03-03 PROCEDURE — 1159F MED LIST DOCD IN RCRD: CPT | Performed by: INTERNAL MEDICINE

## 2025-03-03 PROCEDURE — 3074F SYST BP LT 130 MM HG: CPT | Performed by: INTERNAL MEDICINE

## 2025-03-03 PROCEDURE — 3078F DIAST BP <80 MM HG: CPT | Performed by: INTERNAL MEDICINE

## 2025-03-03 PROCEDURE — 1123F ACP DISCUSS/DSCN MKR DOCD: CPT | Performed by: INTERNAL MEDICINE

## 2025-03-03 PROCEDURE — 85025 COMPLETE CBC W/AUTO DIFF WBC: CPT

## 2025-03-03 PROCEDURE — 99214 OFFICE O/P EST MOD 30 MIN: CPT | Performed by: INTERNAL MEDICINE

## 2025-03-03 RX ORDER — SPIRONOLACTONE 25 MG/1
25 TABLET ORAL DAILY
Qty: 30 TABLET | Refills: 11 | Status: SHIPPED | OUTPATIENT
Start: 2025-03-03

## 2025-03-05 ENCOUNTER — TELEPHONE (OUTPATIENT)
Dept: CARDIAC REHAB | Age: 75
End: 2025-03-05

## 2025-03-05 DIAGNOSIS — I25.83 CORONARY ARTERY DISEASE DUE TO LIPID RICH PLAQUE: Primary | ICD-10-CM

## 2025-03-05 DIAGNOSIS — R06.02 SOB (SHORTNESS OF BREATH): ICD-10-CM

## 2025-03-05 DIAGNOSIS — I50.9 CONGESTIVE HEART FAILURE, UNSPECIFIED HF CHRONICITY, UNSPECIFIED HEART FAILURE TYPE (HCC): ICD-10-CM

## 2025-03-05 DIAGNOSIS — I50.9 HEART FAILURE, UNSPECIFIED HF CHRONICITY, UNSPECIFIED HEART FAILURE TYPE (HCC): Primary | ICD-10-CM

## 2025-03-05 DIAGNOSIS — I25.10 CORONARY ARTERY DISEASE DUE TO LIPID RICH PLAQUE: Primary | ICD-10-CM

## 2025-03-05 DIAGNOSIS — I42.9 CARDIOMYOPATHY, UNSPECIFIED TYPE (HCC): ICD-10-CM

## 2025-03-05 NOTE — TELEPHONE ENCOUNTER
This cardiac patient of Dr. Steiner is called as follow-up to OV with Dr. Steiner on 3/3/25.  Pt denies increased shortness of breath stating, \"I'm doing okay.\"  Admits still not weighing and tracking qAM weights but says he does have a scale in his gym room at home.  After iterating the importance of qAM body weight tracking, pt agrees to start doing tomorrow.  Also reports, when asked, that has just started taking the recently ordered (3/3/25) medication spironolactone.  Pt is reminded of scheduled Anticoagulation visit upcoming on 3/7/25 at 1000 and is agreeable to stop by cardiac rehab for a weight & BP check right afterwards.  Affirms did get reminder text of upcoming heart failure clinic appt with BMP lab prior for 3/12/25 at 1100.  On-going follow-up plan to be discussed with patient at that time.

## 2025-03-06 ENCOUNTER — TELEPHONE (OUTPATIENT)
Dept: CARDIOLOGY CLINIC | Age: 75
End: 2025-03-06

## 2025-03-06 NOTE — TELEPHONE ENCOUNTER
Attempted to call pt   Unable to leave message   DR Steiner reviewed   Monitor   Pt to increase coreg  To one full tab bid.     Pt called back informed

## 2025-03-06 NOTE — PROGRESS NOTES
Ov DR Steiner   Cont to c/o sob  Dizziness   C/o a lot of weakness  No chest pain  C/o bloating   And severe constipation.  Weight gain past few weeks  No edema  Will turn in monitor today.  Using lasix every day    Pt did bring in medication  Bottles.    Will do cbc and bmp today  Will do CHF clinic.    Will start aldactone 25 mg daily  Stop potassium     See in 1 mth      
got better when I stop the ACE inhibitor and placed him on hydralazine.  He cannot financially afford Entresto.    I have added Aldactone 25 mg daily for full medical therapy.    He will need to be watched closely; and therefore, I have sent him to CHF Clinic to be watched as he is also in cardiac rehab.    I am hoping with careful watching and with cardiac rehab that his LV function will improve.  At this point, he is quite limited in his activity.    I will repeat an echocardiogram in 2 months, and if he is still less than 35%, we would have to consider an ICD.    Thank you very much for allowing me the privilege of seeing Mr. Ventura.  If you have any questions on my thoughts, please do not hesitate to contact me.    Sincerely,          FIDEL URIARTE MD      D:  03/05/2025 05:07:13     T:  03/05/2025 06:00:39     [place]  Job #:  [id number]     Doc#:  [id number]

## 2025-03-07 ENCOUNTER — ANTI-COAG VISIT (OUTPATIENT)
Age: 75
End: 2025-03-07
Payer: MEDICARE

## 2025-03-07 ENCOUNTER — HOSPITAL ENCOUNTER (OUTPATIENT)
Dept: CARDIAC REHAB | Age: 75
Discharge: HOME | End: 2025-03-07

## 2025-03-07 VITALS
HEART RATE: 94 BPM | DIASTOLIC BLOOD PRESSURE: 72 MMHG | WEIGHT: 215.4 LBS | SYSTOLIC BLOOD PRESSURE: 96 MMHG | BODY MASS INDEX: 26.92 KG/M2

## 2025-03-07 DIAGNOSIS — I48.91 ATRIAL FIBRILLATION, UNSPECIFIED TYPE (HCC): Primary | ICD-10-CM

## 2025-03-07 LAB
INTERNATIONAL NORMALIZATION RATIO, POC: 1.6
PROTHROMBIN TIME, POC: 0

## 2025-03-07 PROCEDURE — 85610 PROTHROMBIN TIME: CPT | Performed by: FAMILY MEDICINE

## 2025-03-07 PROCEDURE — 99212 OFFICE O/P EST SF 10 MIN: CPT | Performed by: FAMILY MEDICINE

## 2025-03-07 ASSESSMENT — EJECTION FRACTION: EF_VALUE: 25

## 2025-03-07 NOTE — PATIENT INSTRUCTIONS
Please take 3mg (1 tablet) warfarin today then begin warfarin 3mg (1 tablet) Monday and 1.5mg (1/2 tablet) all other days until next INR check on Wednesday March 12  Continue to monitor urine and stool for signs and symptoms of bleeding.   Please notify the clinic of any medication changes.   Please remember to bring all medications (both prescription and OTC) to your next visit.   Kindly notify the clinic if you are unable to make to your next appointment.   Follow warfarin dosing instructions on dosing calendar provided.

## 2025-03-07 NOTE — PROGRESS NOTES
This newly referred HF Clinic pt per Dr. Steiner stops by cardiac rehab department to receive heart failure management education, w/ edu resources provided, body weight check, and BP check.  Body wt 216.0 lb, BP GARRY 112/80 & ULISES 118/80.  Clarified is patient verbalizes understanding medication list and affirms is taking medications as ordered and has started spironolactone and d/c potassium as Dr. Steiner directed on 3/3/25.  Pt denies unusual shortness of breath stating, \"some days are better than others.\"  Other assessment findings are in line with findings per Dr. Steiner's note.  Pt had brought his medication into the Antico Clinic where the MAR is confirmed just prior there.  All patient questions are addressed and answered w/ pt voicing understanding.    Pt is scheduled for HF Clinic full assessment w/ bmp prior per Dr. Steiner referral on 3/12/25 at 1100.  Pt writes down appt time and reminder to go to registration and lab prior to coming to clinic.

## 2025-03-07 NOTE — PROGRESS NOTES
[facility]  ANTICOAGULATION    Referring Provider: Dr Steiner    GOAL INR: 2.0-3.0    TODAY'S INR: 1.6    WARFARIN Dosage: 3mg x1 then increase to 3mg M, 1.5mg all other days    INR (no units)   Date Value   2025 3.1   2025 1.3   2024 1.5   2024 2.3   2024 1.6   2024 2.6   2024 2.5     INR,(POC) (no units)   Date Value   2025 1.6   2025 6.3   2025 4.2   2025 1.5   01/15/2025 1.8   2025 2.0   2024 2.8       Hemoglobin   Date Value Ref Range Status   2025 11.0 (L) 13.5 - 17.5 g/dL Final     Hematocrit   Date Value Ref Range Status   2025 34.0 (L) 41.0 - 53.0 % Final     ALT   Date Value Ref Range Status   2025 26 5 - 41 U/L Final     AST   Date Value Ref Range Status   2025 21 <40 U/L Final       Medication changes:  Potassium discontinued  Spironolactone 25mg daily started  Increased carvedilol to 6.25mg BID    Notes:    Fingerstick INR drawn per clinic protocol. Patient states no visible blood in urine and no black tarry stool. Denies any missed doses of warfarin. No change in other maintenance medications or in diet. Will recheck INR in 5 days. Henri states he is no longer having any symptoms of bleeding but continues to have a poor to no appetite.  Patient states he \"ate a small apple last night and that's about all\".  Patient continues to have SOB-pt allowed me to take him to cardiac rehab in wheel chair.  Having fatigue.  Patient stats he is having trouble \"keeping things straight\"-had confusion about appts today and this next week. Patient will take warfarin 3mg today and Monday and 1.5mg all other days until INR check Wednesday,  prior to cardiac rehab.  Patient acknowledges working in consult agreement with pharmacist as referred by his/her physician.                  For Pharmacy Admin Tracking Only    Intervention Detail: Adherence Monitorin and

## 2025-03-09 LAB
INR BLD: 2.4
PROTIME: NORMAL

## 2025-03-10 PROCEDURE — 99212 OFFICE O/P EST SF 10 MIN: CPT | Performed by: FAMILY MEDICINE

## 2025-03-10 PROCEDURE — 85610 PROTHROMBIN TIME: CPT | Performed by: FAMILY MEDICINE

## 2025-03-12 ENCOUNTER — HOSPITAL ENCOUNTER (OUTPATIENT)
Age: 75
Discharge: HOME | End: 2025-03-12
Payer: MEDICARE

## 2025-03-12 ENCOUNTER — TELEPHONE (OUTPATIENT)
Dept: CARDIAC REHAB | Age: 75
End: 2025-03-12

## 2025-03-12 ENCOUNTER — ANTI-COAG VISIT (OUTPATIENT)
Age: 75
End: 2025-03-12
Payer: MEDICARE

## 2025-03-12 ENCOUNTER — HOSPITAL ENCOUNTER (OUTPATIENT)
Dept: CARDIAC REHAB | Age: 75
Setting detail: THERAPIES SERIES
Discharge: HOME | End: 2025-03-12
Payer: MEDICARE

## 2025-03-12 VITALS — BODY MASS INDEX: 26.67 KG/M2 | WEIGHT: 213.4 LBS

## 2025-03-12 DIAGNOSIS — I50.9 CONGESTIVE HEART FAILURE, UNSPECIFIED HF CHRONICITY, UNSPECIFIED HEART FAILURE TYPE (HCC): ICD-10-CM

## 2025-03-12 DIAGNOSIS — I25.10 CORONARY ARTERY DISEASE DUE TO LIPID RICH PLAQUE: ICD-10-CM

## 2025-03-12 DIAGNOSIS — I42.9 CARDIOMYOPATHY, UNSPECIFIED TYPE (HCC): Primary | ICD-10-CM

## 2025-03-12 DIAGNOSIS — I25.83 CORONARY ARTERY DISEASE DUE TO LIPID RICH PLAQUE: ICD-10-CM

## 2025-03-12 DIAGNOSIS — I42.9 CARDIOMYOPATHY, UNSPECIFIED TYPE (HCC): ICD-10-CM

## 2025-03-12 DIAGNOSIS — Z95.1 S/P CABG X 2: ICD-10-CM

## 2025-03-12 LAB
ANION GAP SERPL CALCULATED.3IONS-SCNC: 10 MMOL/L (ref 9–17)
BUN SERPL-MCNC: 30 MG/DL (ref 8–23)
CALCIUM SERPL-MCNC: 8.8 MG/DL (ref 8.6–10.4)
CHLORIDE SERPL-SCNC: 106 MMOL/L (ref 98–107)
CO2 SERPL-SCNC: 26 MMOL/L (ref 20–31)
CREAT SERPL-MCNC: 1.6 MG/DL (ref 0.7–1.2)
GFR, ESTIMATED: 45 ML/MIN/1.73M2
GLUCOSE SERPL-MCNC: 107 MG/DL (ref 70–99)
INTERNATIONAL NORMALIZATION RATIO, POC: 1.8
POTASSIUM SERPL-SCNC: 3.8 MMOL/L (ref 3.7–5.3)
PROTHROMBIN TIME, POC: 0
SODIUM SERPL-SCNC: 142 MMOL/L (ref 135–144)

## 2025-03-12 PROCEDURE — 85610 PROTHROMBIN TIME: CPT | Performed by: PHARMACIST

## 2025-03-12 PROCEDURE — 36415 COLL VENOUS BLD VENIPUNCTURE: CPT

## 2025-03-12 PROCEDURE — 80048 BASIC METABOLIC PNL TOTAL CA: CPT

## 2025-03-12 PROCEDURE — 99212 OFFICE O/P EST SF 10 MIN: CPT

## 2025-03-12 PROCEDURE — 99211 OFF/OP EST MAY X REQ PHY/QHP: CPT

## 2025-03-12 PROCEDURE — 99211 OFF/OP EST MAY X REQ PHY/QHP: CPT | Performed by: PHARMACIST

## 2025-03-12 NOTE — PROGRESS NOTES
Chief Complaint:  Marked shortness of breath, Loss of energy, Bilateral lower leg and ankle edema, Dizziness, Atypical Chest Pain    Visit Diagnosis:  Cardiomyopathy, Atrial fibrillation w/ occ PVCs, Coronary Artery Disease, Hypertension, Hyperlipidemia, Peripheral artery disease      Objective:  Echocardiogram on 2/25/25 Mod. Mitral & Tricuspid Regurgitation, Severe Left Atrial and Ventricle Dilation, EF 25%  Left Heart Cath on 1/3/25--PCI>SARAH to Left Main Trunk into Left Circumflex  Cath on 1/24/18 EF 30-35%  Echo 1/1/18 EF 25%  CABG 6/10/15 BOO>LAD, SVG>PDA  Carotid Endarterectomy 6/9/15 to PAULINE  Cath 5/5/15 Multivessel CAD   SIG   spironolactone (ALDACTONE) 25 MG tablet Take 1 tablet by mouth daily   carvedilol (COREG) 6.25 MG tablet Take 1 tablet by mouth 2 times daily   hydrALAZINE (APRESOLINE) 25 MG tablet 1/2 tablet three times a day   furosemide (LASIX) 20 MG tablet Take 1 tablet by mouth daily   clopidogrel (PLAVIX) 75 MG tablet Take 1 tablet by mouth daily   warfarin (COUMADIN) 3 MG tablet Take 1 tablet by mouth daily   atorvastatin (LIPITOR) 40 MG tablet Take 1 tablet by mouth daily   aspirin 81 MG EC tablet Take 1 tablet by mouth daily       BMP on 3/3/25 Creatinine 1.4, BUN 25, GFR 53, Potassium 4.3  BMP today Creatinine 1.6, BUN 30, GFR 45, Potassium 3.8  Body Weight 214, HR 69, /68 ULISES, /64 GARRY, SPO2 98%  Lungs bilaterally clear throughout    Abdomen rounded, non-distended  2+ Bilateral ankle and leg pitting edema  (Similar findings were assessed on 3/7/25 except lungs had bibasilar fine crackles rt. > lt.)    Subjective:  Patient arrives with a brother, Paulo, for initial heart failure management visit.  States shortness of breath and energy is \"some\" improved since 3/7/25.  Reports has been gradually been able to increase light physical activity without undo fatigue.  Denies cough, except occasional dry cough, lightheadedness, chest discomfort, or difficulty urinating.  States urine

## 2025-03-12 NOTE — PROGRESS NOTES
[facility]  Medication Management  ANTICOAGULATION    Referring Provider: Dr Steiner     GOAL INR: 2.0-3.0     TODAY'S INR: 1.8     WARFARIN Dosage: Continue 3 mg M/Th, 1.5 mg all other days    INR (no units)   Date Value   2025 3.1   2025 1.3   2024 1.5   2024 2.3   2024 1.6   2024 2.6   2024 2.5     INR,(POC) (no units)   Date Value   2025 1.8   2025 1.6   2025 6.3   2025 4.2   2025 1.5   01/15/2025 1.8   2025 2.0       Hemoglobin   Date Value Ref Range Status   2025 11.0 (L) 13.5 - 17.5 g/dL Final     Hematocrit   Date Value Ref Range Status   2025 34.0 (L) 41.0 - 53.0 % Final     ALT   Date Value Ref Range Status   2025 26 5 - 41 U/L Final     AST   Date Value Ref Range Status   2025 21 <40 U/L Final       Medication changes:  none    Notes:    Fingerstick INR drawn per clinic protocol. Patient states no visible blood in urine, black tarry stool, falls or ER visits but does still feel very short of breath with exertion and still has very little appetite. He had a Cardio Therapy evaluation this morning and will be starting that. Denies any missed or extra doses of warfarin. No other changes in medication or diet. INR is still slightly subtherapeutic so will continue 3 mg M/Th, 1.5 mg all other days and will recheck INR in 3 weeks. Patient acknowledges working in consult agreement with pharmacist as referred by his/her physician.    For Pharmacy Admin Tracking Only    Intervention Detail: Adherence Monitorin and Dose Adjustment: 1, reason: Therapy Optimization  Total # of Interventions Recommended: 2  Total # of Interventions Accepted: 2  Time Spent (min): 20    Jacob Peñaloza, PharmD 3/12/2025 11:38 AM

## 2025-03-12 NOTE — PROGRESS NOTES
HEART FAILURE CLINIC INSTRUCTIONS AS OF 3/12/25     Decrease furosemide (Lasix) to 1 tablet, once daily.  Continue to take spironolactone (Aldactone) 1 tablet, once daily.  ONLY AS NEEDED, Take furosemide (Lasix) 1 tablet, twice daily only if first thing the the morning body weight (with minimal clothing and after urination) goes up more than 2 lbs in one day or >5 lb in a week.  **Be sure to stop Lasix and go back to 1 tablet, once daily morning body weight returns back to normal.    4.  Return here in 1 week to get repeat lab work (prior to coming to rehab) and to begin 1 day per week cardiac rehab.  CR Eval on 3/19/25 at 0800.    5.  Call department if experiencing worsening symptoms or >2-3 lb per day body weight fluctuation occurs.    I have read and agree with all of the above.  Cosigned--Agustin Steiner MD

## 2025-03-18 ENCOUNTER — TELEPHONE (OUTPATIENT)
Dept: CARDIAC REHAB | Age: 75
End: 2025-03-18

## 2025-03-18 DIAGNOSIS — I25.10 CORONARY ARTERY DISEASE DUE TO LIPID RICH PLAQUE: ICD-10-CM

## 2025-03-18 DIAGNOSIS — I42.9 CARDIOMYOPATHY, UNSPECIFIED TYPE (HCC): Primary | ICD-10-CM

## 2025-03-18 DIAGNOSIS — I50.9 CONGESTIVE HEART FAILURE, UNSPECIFIED HF CHRONICITY, UNSPECIFIED HEART FAILURE TYPE (HCC): ICD-10-CM

## 2025-03-18 DIAGNOSIS — I25.83 CORONARY ARTERY DISEASE DUE TO LIPID RICH PLAQUE: ICD-10-CM

## 2025-03-19 ENCOUNTER — HOSPITAL ENCOUNTER (OUTPATIENT)
Age: 75
Discharge: HOME | End: 2025-03-19
Payer: MEDICARE

## 2025-03-19 ENCOUNTER — HOSPITAL ENCOUNTER (OUTPATIENT)
Dept: CARDIAC REHAB | Age: 75
Setting detail: THERAPIES SERIES
Discharge: HOME | End: 2025-03-19
Payer: MEDICARE

## 2025-03-19 DIAGNOSIS — I25.10 CORONARY ARTERY DISEASE DUE TO LIPID RICH PLAQUE: ICD-10-CM

## 2025-03-19 DIAGNOSIS — I42.9 CARDIOMYOPATHY, UNSPECIFIED TYPE (HCC): ICD-10-CM

## 2025-03-19 DIAGNOSIS — Z95.1 S/P CABG X 2: ICD-10-CM

## 2025-03-19 DIAGNOSIS — I50.9 CONGESTIVE HEART FAILURE, UNSPECIFIED HF CHRONICITY, UNSPECIFIED HEART FAILURE TYPE (HCC): ICD-10-CM

## 2025-03-19 DIAGNOSIS — I25.83 CORONARY ARTERY DISEASE DUE TO LIPID RICH PLAQUE: ICD-10-CM

## 2025-03-19 LAB
ANION GAP SERPL CALCULATED.3IONS-SCNC: 10 MMOL/L (ref 9–17)
BUN SERPL-MCNC: 35 MG/DL (ref 8–23)
CALCIUM SERPL-MCNC: 8.9 MG/DL (ref 8.6–10.4)
CHLORIDE SERPL-SCNC: 105 MMOL/L (ref 98–107)
CO2 SERPL-SCNC: 27 MMOL/L (ref 20–31)
CREAT SERPL-MCNC: 1.7 MG/DL (ref 0.7–1.2)
GFR, ESTIMATED: 42 ML/MIN/1.73M2
GLUCOSE SERPL-MCNC: 95 MG/DL (ref 70–99)
POTASSIUM SERPL-SCNC: 4.3 MMOL/L (ref 3.7–5.3)
SODIUM SERPL-SCNC: 142 MMOL/L (ref 135–144)

## 2025-03-19 PROCEDURE — 93798 PHYS/QHP OP CAR RHAB W/ECG: CPT

## 2025-03-19 PROCEDURE — 80048 BASIC METABOLIC PNL TOTAL CA: CPT

## 2025-03-19 PROCEDURE — 36415 COLL VENOUS BLD VENIPUNCTURE: CPT

## 2025-03-19 RX ORDER — CLOPIDOGREL BISULFATE 75 MG/1
75 TABLET ORAL DAILY
COMMUNITY

## 2025-03-19 ASSESSMENT — NEW YORK HEART ASSOCIATION (NYHA) CLASSIFICATION: NYHA FUNCTIONAL CLASS: CLASS III

## 2025-03-19 NOTE — PROGRESS NOTES
Cardiac Rehab Initial History and Assessment    Logan Ventura   1950  [id number]  3/19/2025    Pre-cert Verification: COURTESY INSURANCE COVERAGE CHECK W/ VARSHA DIAZ COMPLETED, AGENT EVERARDO VIRGEN, JAMEYANING EXPECTED $35 / VISIT CO-PAY.  PT VERBALIZES UNDERSTANDING & ACCEPTANCE OF OWN FINANCIAL RESPONSIBILITY FOR OOP NOT COVERED     Primary Diagnosis: PCI  Onset: 1/3/25  Living Will: [] Yes   [x] No  On File: [] Yes   [] No   [x] N/A  Durable Power of : [] Yes   [x] No  Pt requests Living Will information.: [] Yes   [x] No    Medical History  Past Medical History:   Diagnosis Date    Arthritis     LEFT ANKLE    Atrial fibrillation (HCC)     Carotid stenosis 05/02/2017    CHF (congestive heart failure) (HCC)     Coronary artery disease due to lipid rich plaque 08/10/2015    Essential hypertension 08/10/2015    Hyperlipidemia 08/10/2015     Past Surgical History:   Procedure Laterality Date    CARDIAC CATHETERIZATION Left 05/06/2015    Dr. Steiner[facility]-Severe CAD, Diffuse 90% disease in the proximal left anterior descending coronary artery.  80% disease in the distal circumflex.  80% disease in small nondominant right coronary artery.  Borderline normal LV function, ejection fraction of 50%    CARDIAC CATHETERIZATION Left 01/24/2018    Dr. Steiner @ [facility] --CAD, Small nondominant right coronary artery with 90% disease.  severe 95% disease in the native left anterior descending.  50% disease at the ostium of a very large circumflex with a fractional flow reserve of 0.98.  Occluded patent ductus arteriosus, which arises from the circumflex.  Occluded saphenous vein graft to the patent ductus arteriosus.      CARDIAC CATHETERIZATION  01/03/2025    CAROTID ENDARTERECTOMY Right     CATARACT EXTRACTION, BILATERAL Bilateral     fall 2023    CORONARY ARTERY BYPASS GRAFT  06/10/2015    Dr Andrade x2    EYE SURGERY  2023    cataract    KNEE SURGERY Left 10/14/2015    Lt knee bursa repair    NOSE SURGERY

## 2025-03-19 NOTE — PROGRESS NOTES
Patient Instructions:  Take furosemide (Lasix) to 1 tablet, once daily.  Take spironolactone (Aldactone) 1 tablet, once daily.  ONLY AS NEEDED, Take furosemide (Lasix) 1 tablet, twice daily only if first thing the the morning body weight (with minimal clothing and after urination) goes up more than 2 lbs in one day or >5 lb in a week.  **Be sure to stop Lasix and go back to 1 tablet, once daily morning body weight returns back to normal.     4.  Next cardiac rehab visit will be Friday, March 21 at 8:00.     5.  Call department if experiencing worsening symptoms or >2-3 lb per day body weight fluctuation occurs.     I have read and agree with all of the above.  Cosigned--Agustin Steiner MD

## 2025-03-19 NOTE — PROGRESS NOTES
Cardiac Rehabilitation   Physician Order Form    Logan PAZ Lorenzo  1950  [id number]  3/19/2025    [x] Phase 2 ECG Monitored Cardiac Rehabilitation    [] MI   [x] Percutaneous Coronary Intervention          [] Other:   [] CABG  [] Heart Valve Repair/Replaced   [] Stable Angina [] Heart Failure:     Onset Date: 1/3/25                    Cardiac Education Goals: (see individualized treatment plan for specific goals, progression & compliance)    [x] Hypertension [x] Physical Inactivity  [x] Cardiac A&P    [x] Heart Failure [x] Medications                       [] Coping w/ Anxiety / Depression  [] Diabetes  [x] Weight Management [x] Angina  [x] Hyperlipidemia       [x] Home Exercise             [x] Stress Reduction and Relaxation   [x] Medications           [] Smoking Cessation                                                Prescribed Exercise Plan:    Target Hr: 108-120       Duration: 31 - 60 Minutes  Frequency: 3 Days per week  Initial Met Level: 2.2-2.9  Limitations: WATCH BODY WATER WEIGHT FLUCTUATIONS D/T HEART FAILURE Dx    Modalities:  [x]Treadmill   [x] UBE  [x] Seated Stepper  [x] Rowing Machine  [x] Weights/therabands  [] Total Body Ergometer    Aerobic exercise to total 31-60 minutes. Progressing by 1-2 minutes per week and/or 1-2 levels per week per patient tolerance using various modalities; according to Rylan Scale 12-16 and THR  Introduce 8-12 bilateral UE and LE progressive resistance exercises at 1-3 sets per lift, on 2-3 non-consecutive days using weights/ ORANGE  therabands AND WTS TO 8-24 # for 8-15 reps to progressive overload by increasing resistance once reps progressed to at least 15 reps on at least 2 occasions                 Per patient symptoms use:  Appropriate ACLS Algorhythm for Cardiac Events.  Nitroglycerine 0.4mg SLq 5mins X 3 for angina pain.  12 lead EKG for c/o chest pain or change in rhythm.  Nasal O2 for SaO2 <90% or symptoms warranted.  Blood sugar monitoring for

## 2025-03-19 NOTE — FLOWSHEET NOTE
ZONES HANDOUT, ALONG WITH DAILY WT AND S/S TRACKING FORM GIVEN)   Med(s) Change No   BP Meds YES   ACE/ARB/ARNI Prescribed No   ACE/ARB/ARNI Adherent No/exception   BB Prescribed Yes   BB Adherent Yes   Antiplatelet/Anticoagulant Prescribed Yes   Antiplatelet/Anticoagulant Adherent Yes   Statins/Anti-hyperlipidemic Prescribed Yes   Statins/Anti-hyperlipidemic Adherent Yes   Statins/Anti-hyperlipidemic Intensity Moderate   Other Core Component - Medication Compliance   Medication Compliance Managed as Core Component No   Other Core Component - Other Risk Factor   Other Modifiable Risk Factor Managed as Core Component No

## 2025-03-21 ENCOUNTER — HOSPITAL ENCOUNTER (OUTPATIENT)
Dept: CARDIAC REHAB | Age: 75
Setting detail: THERAPIES SERIES
Discharge: HOME | End: 2025-03-21
Payer: MEDICARE

## 2025-03-21 PROCEDURE — 93798 PHYS/QHP OP CAR RHAB W/ECG: CPT

## 2025-03-25 ENCOUNTER — HOSPITAL ENCOUNTER (OUTPATIENT)
Dept: CARDIAC REHAB | Age: 75
Setting detail: THERAPIES SERIES
Discharge: HOME | End: 2025-03-25
Payer: MEDICARE

## 2025-03-25 ENCOUNTER — TELEPHONE (OUTPATIENT)
Dept: CARDIAC REHAB | Age: 75
End: 2025-03-25

## 2025-03-25 DIAGNOSIS — I25.10 CORONARY ARTERY DISEASE DUE TO LIPID RICH PLAQUE: ICD-10-CM

## 2025-03-25 DIAGNOSIS — I25.83 CORONARY ARTERY DISEASE DUE TO LIPID RICH PLAQUE: ICD-10-CM

## 2025-03-25 DIAGNOSIS — I50.9 CONGESTIVE HEART FAILURE, UNSPECIFIED HF CHRONICITY, UNSPECIFIED HEART FAILURE TYPE (HCC): ICD-10-CM

## 2025-03-25 DIAGNOSIS — Z95.1 S/P CABG X 2: ICD-10-CM

## 2025-03-25 DIAGNOSIS — I42.9 CARDIOMYOPATHY, UNSPECIFIED TYPE (HCC): Primary | ICD-10-CM

## 2025-03-25 PROCEDURE — 93798 PHYS/QHP OP CAR RHAB W/ECG: CPT

## 2025-03-25 NOTE — TELEPHONE ENCOUNTER
BMP lab order for heart failure management and then cardiac rehab exercise visit scheduled for 3/27/25

## 2025-03-27 ENCOUNTER — HOSPITAL ENCOUNTER (OUTPATIENT)
Dept: CARDIAC REHAB | Age: 75
Setting detail: THERAPIES SERIES
Discharge: HOME | End: 2025-03-27
Payer: MEDICARE

## 2025-03-27 ENCOUNTER — HOSPITAL ENCOUNTER (OUTPATIENT)
Age: 75
Discharge: HOME | End: 2025-03-27
Payer: MEDICARE

## 2025-03-27 DIAGNOSIS — I50.9 CONGESTIVE HEART FAILURE, UNSPECIFIED HF CHRONICITY, UNSPECIFIED HEART FAILURE TYPE (HCC): ICD-10-CM

## 2025-03-27 DIAGNOSIS — I25.83 CORONARY ARTERY DISEASE DUE TO LIPID RICH PLAQUE: ICD-10-CM

## 2025-03-27 DIAGNOSIS — I25.10 CORONARY ARTERY DISEASE DUE TO LIPID RICH PLAQUE: ICD-10-CM

## 2025-03-27 DIAGNOSIS — Z95.1 S/P CABG X 2: ICD-10-CM

## 2025-03-27 DIAGNOSIS — I42.9 CARDIOMYOPATHY, UNSPECIFIED TYPE (HCC): ICD-10-CM

## 2025-03-27 LAB
ANION GAP SERPL CALCULATED.3IONS-SCNC: 11 MMOL/L (ref 9–17)
BUN SERPL-MCNC: 32 MG/DL (ref 8–23)
CALCIUM SERPL-MCNC: 8.8 MG/DL (ref 8.6–10.4)
CHLORIDE SERPL-SCNC: 107 MMOL/L (ref 98–107)
CO2 SERPL-SCNC: 24 MMOL/L (ref 20–31)
CREAT SERPL-MCNC: 1.5 MG/DL (ref 0.7–1.2)
GFR, ESTIMATED: 49 ML/MIN/1.73M2
GLUCOSE SERPL-MCNC: 122 MG/DL (ref 70–99)
POTASSIUM SERPL-SCNC: 4.5 MMOL/L (ref 3.7–5.3)
SODIUM SERPL-SCNC: 142 MMOL/L (ref 135–144)

## 2025-03-27 PROCEDURE — 36415 COLL VENOUS BLD VENIPUNCTURE: CPT

## 2025-03-27 PROCEDURE — 93798 PHYS/QHP OP CAR RHAB W/ECG: CPT

## 2025-03-27 PROCEDURE — 80048 BASIC METABOLIC PNL TOTAL CA: CPT

## 2025-03-27 NOTE — PROGRESS NOTES
Chief Complaint:  Marked shortness of breath, Loss of energy, Bilateral lower leg and ankle edema, Dizziness, Atypical Chest Pain     Visit Diagnosis:  Cardiomyopathy, Atrial fibrillation w/ occ PVCs, Coronary Artery Disease, Hypertension, Hyperlipidemia, Peripheral artery disease        Objective:  Echocardiogram on 2/25/25 Mod. Mitral & Tricuspid Regurgitation, Severe Left Atrial and Ventricle Dilation, EF 25%  Left Heart Cath on 1/3/25--PCI>SARAH to Left Main Trunk into Left Circumflex  Cath on 1/24/18 EF 30-35%  Echo 1/1/18 EF 25%  CABG 6/10/15 BOO>LAD, SVG>PDA  Carotid Endarterectomy 6/9/15 to PAULINE  Cath 5/5/15 Multivessel CAD  Early Outpatient Cardiac Rehab  Started on 3/19/25 & completed 3rd session today.  Peak HR today 95, peak BP 96/62, and tolerated 2 intervals (4 min first & 2 min second on treadmill followed by 10 min on both the UBE and seated stepper.  Endurance has improved from tolerating 10 minutes of intermittent seated stepper exercise on first visit.      SIG   spironolactone (ALDACTONE) 25 MG tablet Take 1 tablet by mouth daily   carvedilol (COREG) 6.25 MG tablet Take 1 tablet by mouth 2 times daily   hydrALAZINE (APRESOLINE) 25 MG tablet 1/2 tablet three times a day   furosemide (LASIX) 20 MG tablet Take 1 tablet by mouth daily   clopidogrel (PLAVIX) 75 MG tablet Take 1 tablet by mouth daily   warfarin (COUMADIN) 3 MG tablet Take 1 tablet by mouth daily   atorvastatin (LIPITOR) 40 MG tablet Take 1 tablet by mouth daily   aspirin 81 MG EC tablet Take 1 tablet by mouth daily         Today's BMP 3/27/25 Creatinine 1.5, BUN 32, GFR 49, Potassium 4.5  Last BMP on 3/19/25 Creatinine 1.7, BUN 35, GFR 42, Potassium 4.3  Body Weight 217, HR 88, BP 96/66 ULISES, /66 GARRY, SPO2 100%  Lungs bilaterally clear throughout     Abdomen rounded, non-distended, soft  2+ Bilateral ankle and leg pitting edema (Wearing compression socks)  Lungs bilaterally clear throughout     Subjective:  Patient arrives today

## 2025-04-01 ENCOUNTER — HOSPITAL ENCOUNTER (OUTPATIENT)
Dept: CARDIAC REHAB | Age: 75
Setting detail: THERAPIES SERIES
Discharge: HOME | End: 2025-04-01

## 2025-04-01 NOTE — PROGRESS NOTES
Patient arrives today for cardiac rehab session and brief heart failure management reassessment c/o weakness and bloating but denies shortness of breath.  States had been riding home cycle ergometer and walking daily with improving stamina until yesterday having c/o onset of weakness, bloating, a non-injury sustaining fall.  Reports not writing down daily weights but weighing staying approximately 217 lb daily.    Today pt ambulates in w/o observable shortness of breath but again c/o weakness.  Hook-up to cardiac monitor displays continued rate-controlled atrial fibrillation w/ PVCs.  Body weight is up here 6 lb since 3/27/25.  Not wearing compression hose today, ankle edema remains 2+ with firm calves.  States feels more bloated, and abdomen found to be firm but not distended.  Pt says had to put on looser jeans today d/t bloating.   Lungs remain bilaterally clear upon auscultation.  Denying lightheadedness BP remains at baseline at 102/60 today. However pt attempts low level interval seated stepping, and BP drops to 76/56 after 4 minutes of exercise moving 1-minute intervals at a time before resting.  BP returns to baseline to 100/58 following 3-minute rest.  Standing orthostatic check holds BP stable at 98/58 with pt denying lightheadedness.    Pt is encouraged to go home and rest mostly today.  Per prior instruction w/ Dr. Steiner approval (see note from 3/27/25), pt is reminded to take 2nd Lasix, 20 mg today.  Per this RN's request, pt goes home and takes blood pressure and body weight at home and call back with those numbers before taking more Lasix.  BP at home reported to be 117/85 at home and body weight at 224.5 at that time.  Instruction to take 2nd Lasix given and request for pt to call department back tomorrow morning with BP and qAM body weight with reassessment discussion to ensue.  Patient reminded to go to ED if worsening symptoms develop.  Schedule to re

## 2025-04-02 ENCOUNTER — ANTI-COAG VISIT (OUTPATIENT)
Age: 75
End: 2025-04-02
Payer: MEDICARE

## 2025-04-02 VITALS
SYSTOLIC BLOOD PRESSURE: 146 MMHG | DIASTOLIC BLOOD PRESSURE: 79 MMHG | HEART RATE: 84 BPM | BODY MASS INDEX: 28.25 KG/M2 | WEIGHT: 226 LBS

## 2025-04-02 LAB
INTERNATIONAL NORMALIZATION RATIO, POC: 2.6
PROTHROMBIN TIME, POC: 0

## 2025-04-02 PROCEDURE — 85610 PROTHROMBIN TIME: CPT | Performed by: PHARMACIST

## 2025-04-02 PROCEDURE — 99211 OFF/OP EST MAY X REQ PHY/QHP: CPT | Performed by: PHARMACIST

## 2025-04-02 NOTE — PROGRESS NOTES
[facility]  ANTICOAGUL[facility]    Referring Provider: Dr Agustin Steiner     GOAL INR: 2.0-3.0     TODAY'S INR: 2.6     WARFARIN Dosage: Continue 3 mg M/Th, 1.5 mg all other days    INR (no units)   Date Value   2025 3.1   2025 1.3   2024 1.5   2024 2.3   2024 1.6   2024 2.6   2024 2.5     INR,(POC) (no units)   Date Value   2025 2.6   2025 1.8   2025 1.6   2025 6.3   2025 4.2   2025 1.5   01/15/2025 1.8       Hemoglobin   Date Value Ref Range Status   2025 11.0 (L) 13.5 - 17.5 g/dL Final     Hematocrit   Date Value Ref Range Status   2025 34.0 (L) 41.0 - 53.0 % Final     ALT   Date Value Ref Range Status   2025 26 5 - 41 U/L Final     AST   Date Value Ref Range Status   2025 21 <40 U/L Final       Medication changes:  None     Notes:    Fingerstick INR drawn per clinic protocol. Patient states no visible blood in urine, falls or ER visits but states that his stool seems a \"little\" darker. States he is also constipated and will use OTC medications to help resolve. He will continue to monitor. He does arrive by chair today due to shortness of breath and weakness in his legs. He states he is pushing himself at therapy as ordered and has an appointment with his cardiologist tomorrow for the shortness of breath. He does deny any missed or extra doses of warfarin. No change in other maintenance medications or in diet. INR is therapeutic so will continue 3 mg M/Th, 1.5 mg all other days and will recheck INR in 5 weeks. Patient acknowledges working in consult agreement with pharmacist as referred by his/her physician.    For Pharmacy Admin Tracking Only    Intervention Detail: Adherence Monitorin  Total # of Interventions Recommended: 1  Total # of Interventions Accepted: 1  Time Spent (min): 30    Jacob Peñaloza, Tami 2025 10:12 AM

## 2025-04-03 ENCOUNTER — APPOINTMENT (OUTPATIENT)
Dept: GENERAL RADIOLOGY | Age: 75
End: 2025-04-03
Payer: MEDICARE

## 2025-04-03 ENCOUNTER — OFFICE VISIT (OUTPATIENT)
Dept: CARDIOLOGY CLINIC | Age: 75
End: 2025-04-03
Payer: MEDICARE

## 2025-04-03 ENCOUNTER — HOSPITAL ENCOUNTER (EMERGENCY)
Age: 75
Discharge: ANOTHER HOSPITAL | End: 2025-04-04
Attending: FAMILY MEDICINE
Payer: MEDICARE

## 2025-04-03 VITALS
BODY MASS INDEX: 26.62 KG/M2 | WEIGHT: 213 LBS | HEART RATE: 81 BPM | DIASTOLIC BLOOD PRESSURE: 50 MMHG | OXYGEN SATURATION: 99 % | SYSTOLIC BLOOD PRESSURE: 98 MMHG

## 2025-04-03 DIAGNOSIS — Z79.01 LONG TERM CURRENT USE OF ANTICOAGULANT: ICD-10-CM

## 2025-04-03 DIAGNOSIS — I50.22 CHRONIC HEART FAILURE WITH REDUCED EJECTION FRACTION (HFREF, <= 40%) (HCC): Primary | ICD-10-CM

## 2025-04-03 DIAGNOSIS — I50.20 ACC/AHA STAGE C SYSTOLIC HEART FAILURE DUE TO ISCHEMIC CARDIOMYOPATHY (HCC): Primary | ICD-10-CM

## 2025-04-03 DIAGNOSIS — R06.02 SHORTNESS OF BREATH ON EXERTION: ICD-10-CM

## 2025-04-03 DIAGNOSIS — I25.5 ACC/AHA STAGE C SYSTOLIC HEART FAILURE DUE TO ISCHEMIC CARDIOMYOPATHY (HCC): Primary | ICD-10-CM

## 2025-04-03 DIAGNOSIS — R60.0 BILATERAL LOWER EXTREMITY EDEMA: ICD-10-CM

## 2025-04-03 LAB
ALBUMIN SERPL-MCNC: 3.6 G/DL (ref 3.5–5.2)
ALBUMIN/GLOB SERPL: 1.6 {RATIO} (ref 1–2.5)
ALP SERPL-CCNC: 94 U/L (ref 40–129)
ALT SERPL-CCNC: 23 U/L (ref 5–41)
ANION GAP SERPL CALCULATED.3IONS-SCNC: 12 MMOL/L (ref 9–17)
AST SERPL-CCNC: 32 U/L
BASOPHILS # BLD: 0.06 K/UL (ref 0–0.2)
BASOPHILS NFR BLD: 1 % (ref 0–2)
BILIRUB SERPL-MCNC: 1.5 MG/DL (ref 0.3–1.2)
BNP SERPL-MCNC: 4918 PG/ML
BUN SERPL-MCNC: 41 MG/DL (ref 8–23)
CALCIUM SERPL-MCNC: 8.8 MG/DL (ref 8.6–10.4)
CHLORIDE SERPL-SCNC: 106 MMOL/L (ref 98–107)
CO2 SERPL-SCNC: 23 MMOL/L (ref 20–31)
CREAT SERPL-MCNC: 1.6 MG/DL (ref 0.7–1.2)
EOSINOPHIL # BLD: 0.34 K/UL (ref 0–0.4)
EOSINOPHILS RELATIVE PERCENT: 5 % (ref 0–5)
ERYTHROCYTE [DISTWIDTH] IN BLOOD BY AUTOMATED COUNT: 20.2 % (ref 12.1–15.2)
GFR, ESTIMATED: 45 ML/MIN/1.73M2
GLUCOSE SERPL-MCNC: 94 MG/DL (ref 70–99)
HCT VFR BLD AUTO: 34.9 % (ref 41–53)
HGB BLD-MCNC: 11.5 G/DL (ref 13.5–17.5)
IMM GRANULOCYTES # BLD AUTO: 0.01 K/UL (ref 0–0.3)
IMM GRANULOCYTES NFR BLD: 0 % (ref 0–5)
INR PPP: 2.2
LYMPHOCYTES NFR BLD: 1.18 K/UL (ref 1–4.8)
LYMPHOCYTES RELATIVE PERCENT: 18 % (ref 13–44)
MCH RBC QN AUTO: 25.2 PG (ref 26–34)
MCHC RBC AUTO-ENTMCNC: 33 G/DL (ref 31–37)
MCV RBC AUTO: 76.4 FL (ref 80–100)
MONOCYTES NFR BLD: 0.78 K/UL (ref 0–1)
MONOCYTES NFR BLD: 12 % (ref 5–9)
MORPHOLOGY: ABNORMAL
NEUTROPHILS NFR BLD: 65 % (ref 39–75)
NEUTS SEG NFR BLD: 4.38 K/UL (ref 2.1–6.5)
PLATELET # BLD AUTO: 206 K/UL (ref 140–450)
PMV BLD AUTO: 10.8 FL (ref 6–12)
POTASSIUM SERPL-SCNC: 4.4 MMOL/L (ref 3.7–5.3)
PROT SERPL-MCNC: 5.8 G/DL (ref 6.4–8.3)
PROTHROMBIN TIME: 25.1 SEC (ref 11.5–14.2)
RBC # BLD AUTO: 4.57 M/UL (ref 4.5–5.9)
SARS-COV-2 RDRP RESP QL NAA+PROBE: NOT DETECTED
SODIUM SERPL-SCNC: 141 MMOL/L (ref 135–144)
SPECIMEN DESCRIPTION: NORMAL
TROPONIN I SERPL HS-MCNC: 22 NG/L (ref 0–22)
WBC OTHER # BLD: 6.8 K/UL (ref 3.5–11)

## 2025-04-03 PROCEDURE — 87635 SARS-COV-2 COVID-19 AMP PRB: CPT

## 2025-04-03 PROCEDURE — 85610 PROTHROMBIN TIME: CPT

## 2025-04-03 PROCEDURE — 99214 OFFICE O/P EST MOD 30 MIN: CPT | Performed by: INTERNAL MEDICINE

## 2025-04-03 PROCEDURE — 3074F SYST BP LT 130 MM HG: CPT | Performed by: INTERNAL MEDICINE

## 2025-04-03 PROCEDURE — 1123F ACP DISCUSS/DSCN MKR DOCD: CPT | Performed by: INTERNAL MEDICINE

## 2025-04-03 PROCEDURE — 93005 ELECTROCARDIOGRAM TRACING: CPT | Performed by: FAMILY MEDICINE

## 2025-04-03 PROCEDURE — 99285 EMERGENCY DEPT VISIT HI MDM: CPT

## 2025-04-03 PROCEDURE — 80053 COMPREHEN METABOLIC PANEL: CPT

## 2025-04-03 PROCEDURE — 84484 ASSAY OF TROPONIN QUANT: CPT

## 2025-04-03 PROCEDURE — 71045 X-RAY EXAM CHEST 1 VIEW: CPT

## 2025-04-03 PROCEDURE — 83880 ASSAY OF NATRIURETIC PEPTIDE: CPT

## 2025-04-03 PROCEDURE — 85025 COMPLETE CBC W/AUTO DIFF WBC: CPT

## 2025-04-03 PROCEDURE — 3078F DIAST BP <80 MM HG: CPT | Performed by: INTERNAL MEDICINE

## 2025-04-03 PROCEDURE — 51798 US URINE CAPACITY MEASURE: CPT

## 2025-04-03 PROCEDURE — 1159F MED LIST DOCD IN RCRD: CPT | Performed by: INTERNAL MEDICINE

## 2025-04-03 PROCEDURE — 6370000000 HC RX 637 (ALT 250 FOR IP): Performed by: FAMILY MEDICINE

## 2025-04-03 RX ORDER — FUROSEMIDE 20 MG/1
20 TABLET ORAL ONCE
Status: COMPLETED | OUTPATIENT
Start: 2025-04-03 | End: 2025-04-03

## 2025-04-03 RX ADMIN — FUROSEMIDE 20 MG: 20 TABLET ORAL at 18:09

## 2025-04-03 ASSESSMENT — PAIN - FUNCTIONAL ASSESSMENT: PAIN_FUNCTIONAL_ASSESSMENT: NONE - DENIES PAIN

## 2025-04-03 ASSESSMENT — ENCOUNTER SYMPTOMS: SHORTNESS OF BREATH: 1

## 2025-04-03 NOTE — ED PROVIDER NOTES
[facility]  EMERGENCY DEPARTMENT ENCOUNTER      Pt Name: Logan Ventura  MRN: [id number]  Birthdate 1950  Date of evaluation: 4/3/2025  Provider: Joe Thomas MD    CHIEF COMPLAINT       Chief Complaint   Patient presents with    Shortness of Breath     Sent by Dr. Steiner office. SOB and weakness worsening over the past week.          HISTORY OF PRESENT ILLNESS      Logan Ventura is a 74 y.o. male who presents to the emergency department via walkover from cardiology office, patient had a drive-by private vehicle to cardiology, his increased shortness of breath with any exertion or long sentences, cannot maintain a longer rotation without having to stop or pause for breath, as well as even getting out of a chair.  No difficulty laying down.  Has had increased lower extremity edema.  Denies any chest pain.  Denies any fever.  Patient states compliant all medications were    ~1030 hrs -prior to patient arrival, Dr. Steiner cardiology from cardiology, advised that he send the patient over, concerned that patient has been having increased shortness of breath, swelling around the ankles, EF is worsened and currently 25%, systolic pressure 100.  Given patient's cardiac history and condition, does ask for [facility] however if cannot get there [place] or even [place] may be acceptable    PCP: Macy Rincon: [place]    REVIEW OF SYSTEMS       Review of Systems   Respiratory:  Positive for shortness of breath.    Cardiovascular:  Positive for leg swelling. Negative for chest pain.   All other systems reviewed and are negative.        PAST MEDICAL HISTORY     Past Medical History:   Diagnosis Date    Arthritis     LEFT ANKLE    Atrial fibrillation (HCC)     Carotid stenosis 05/02/2017    CHF (congestive heart failure) (HCC)     Coronary artery disease due to lipid rich plaque 08/10/2015    Essential hypertension 08/10/2015    Hyperlipidemia 08/10/2015         SURGICAL HISTORY       Past Surgical

## 2025-04-03 NOTE — PROGRESS NOTES
Ov Dr. Steiner for follow up  Here today with sister Magda   Which lives in [place]  Did not bring med/list   C/o weak   Here with sister   Her \"observation\"  Pt not helping - labored breathing   No stamina

## 2025-04-03 NOTE — PROGRESS NOTES
Agustin Steiner M.D.  [facility]  [facility]  [address] [id number] [phone]        2025        Alondra Cavazos MD  [address] [facility] [address]     RE:  LENNIE VENTURA  :  1950    Dear Dr. Cavazos:    CHIEF COMPLAINT:    Marked shortness of breath.  Status post cardiac catheterization January 3, 2025, showing patent LIMA to LAD with 65% lesion beyond the anastomotic site with 80% disease in the ostium of a very large circumflex and 80% disease in the left main trunk leading to circumflex, 90% disease of his very small, nondominant right coronary artery with EF of 30% to 35% with stent in the left main and the circumflex using 4.0 x 38 mm drug-eluting Synergy stent.  Severe LV dysfunction now with an EF in the 20% to 25% range.    HISTORY OF PRESENT ILLNESS:  I had the pleasure to meet with Lennie Ventura in the office on April 3, 2025.  He is a pleasant 74-year-old gentleman with a complex cardiac history.    I met him on 2015, for cardiac clearance of left knee replacement.  He was on no medications at that time.  Stress test was abnormal on May 5, 2015, which resulted in a catheterization on May 5th.  He had an abnormal stress test which resulted in a catheterization on May 5, 2015, that showed 90% proximal LAD, 80% circumflex, 80% RCA with an EF of 50%.  He also had critical right carotid disease.    He had a right carotid endarterectomy by Dr. Maxwell on 2015.    The following day on Kathryn 10, 2015, he had open-heart surgery by Dr. Chapin Fan with a LIMA to LAD and a vein graft to the PDA, which arose from the circumflex.    He came to the emergency room on 2018, and echocardiogram showed an EF of 25%.  Repeat catheterization 2018, showed patent LIMA to LAD, the circumflex had 50% in the ostium, PDA was occluded which arose from the circumflex, the vein graft to the PDA was

## 2025-04-04 VITALS
BODY MASS INDEX: 26.49 KG/M2 | OXYGEN SATURATION: 87 % | HEART RATE: 88 BPM | WEIGHT: 213 LBS | RESPIRATION RATE: 28 BRPM | DIASTOLIC BLOOD PRESSURE: 85 MMHG | TEMPERATURE: 97.4 F | HEIGHT: 75 IN | SYSTOLIC BLOOD PRESSURE: 103 MMHG

## 2025-04-04 LAB
ALBUMIN SERPL-MCNC: 3.3 G/DL (ref 3.5–5.2)
ALBUMIN/GLOB SERPL: 1.6 {RATIO} (ref 1–2.5)
ALP SERPL-CCNC: 85 U/L (ref 40–129)
ALT SERPL-CCNC: 19 U/L (ref 5–41)
ANION GAP SERPL CALCULATED.3IONS-SCNC: 10 MMOL/L (ref 9–17)
AST SERPL-CCNC: 27 U/L
BASOPHILS # BLD: 0.05 K/UL (ref 0–0.2)
BASOPHILS NFR BLD: 1 % (ref 0–2)
BILIRUB SERPL-MCNC: 1.1 MG/DL (ref 0.3–1.2)
BNP SERPL-MCNC: 3942 PG/ML
BUN SERPL-MCNC: 37 MG/DL (ref 8–23)
CALCIUM SERPL-MCNC: 8.6 MG/DL (ref 8.6–10.4)
CHLORIDE SERPL-SCNC: 106 MMOL/L (ref 98–107)
CO2 SERPL-SCNC: 23 MMOL/L (ref 20–31)
CREAT SERPL-MCNC: 1.5 MG/DL (ref 0.7–1.2)
EKG Q-T INTERVAL: 448 MS
EKG QRS DURATION: 116 MS
EKG QTC CALCULATION (BAZETT): 520 MS
EKG R AXIS: 25 DEGREES
EKG T AXIS: -177 DEGREES
EKG VENTRICULAR RATE: 81 BPM
EOSINOPHIL # BLD: 0.41 K/UL (ref 0–0.4)
EOSINOPHILS RELATIVE PERCENT: 6 % (ref 0–5)
ERYTHROCYTE [DISTWIDTH] IN BLOOD BY AUTOMATED COUNT: 19.7 % (ref 12.1–15.2)
GFR, ESTIMATED: 49 ML/MIN/1.73M2
GLUCOSE SERPL-MCNC: 101 MG/DL (ref 70–99)
HCT VFR BLD AUTO: 31.5 % (ref 41–53)
HGB BLD-MCNC: 10.5 G/DL (ref 13.5–17.5)
IMM GRANULOCYTES # BLD AUTO: 0.01 K/UL (ref 0–0.3)
IMM GRANULOCYTES NFR BLD: 0 % (ref 0–5)
INR BLD: 2.2
LYMPHOCYTES NFR BLD: 1.27 K/UL (ref 1–4.8)
LYMPHOCYTES RELATIVE PERCENT: 19 % (ref 13–44)
MCH RBC QN AUTO: 25.8 PG (ref 26–34)
MCHC RBC AUTO-ENTMCNC: 33.3 G/DL (ref 31–37)
MCV RBC AUTO: 77.4 FL (ref 80–100)
MONOCYTES NFR BLD: 0.78 K/UL (ref 0–1)
MONOCYTES NFR BLD: 12 % (ref 5–9)
MORPHOLOGY: ABNORMAL
NEUTROPHILS NFR BLD: 62 % (ref 39–75)
NEUTS SEG NFR BLD: 4.05 K/UL (ref 2.1–6.5)
PLATELET # BLD AUTO: 180 K/UL (ref 140–450)
PMV BLD AUTO: 10.5 FL (ref 6–12)
POTASSIUM SERPL-SCNC: 4.2 MMOL/L (ref 3.7–5.3)
PROT SERPL-MCNC: 5.4 G/DL (ref 6.4–8.3)
PROTIME: NORMAL
RBC # BLD AUTO: 4.07 M/UL (ref 4.5–5.9)
SODIUM SERPL-SCNC: 139 MMOL/L (ref 135–144)
TROPONIN I SERPL HS-MCNC: 21 NG/L (ref 0–22)
WBC OTHER # BLD: 6.6 K/UL (ref 3.5–11)

## 2025-04-04 PROCEDURE — 6360000002 HC RX W HCPCS: Performed by: FAMILY MEDICINE

## 2025-04-04 PROCEDURE — 83880 ASSAY OF NATRIURETIC PEPTIDE: CPT

## 2025-04-04 PROCEDURE — 93010 ELECTROCARDIOGRAM REPORT: CPT | Performed by: INTERNAL MEDICINE

## 2025-04-04 PROCEDURE — 96374 THER/PROPH/DIAG INJ IV PUSH: CPT

## 2025-04-04 PROCEDURE — 84484 ASSAY OF TROPONIN QUANT: CPT

## 2025-04-04 PROCEDURE — 80053 COMPREHEN METABOLIC PANEL: CPT

## 2025-04-04 PROCEDURE — 85025 COMPLETE CBC W/AUTO DIFF WBC: CPT

## 2025-04-04 PROCEDURE — 6370000000 HC RX 637 (ALT 250 FOR IP): Performed by: EMERGENCY MEDICINE

## 2025-04-04 PROCEDURE — 36415 COLL VENOUS BLD VENIPUNCTURE: CPT

## 2025-04-04 RX ORDER — CLOPIDOGREL BISULFATE 75 MG/1
75 TABLET ORAL ONCE
Status: COMPLETED | OUTPATIENT
Start: 2025-04-04 | End: 2025-04-04

## 2025-04-04 RX ORDER — DIGOXIN 0.25 MG/ML
125 INJECTION INTRAMUSCULAR; INTRAVENOUS ONCE
Status: COMPLETED | OUTPATIENT
Start: 2025-04-04 | End: 2025-04-04

## 2025-04-04 RX ORDER — CARVEDILOL 3.12 MG/1
6.25 TABLET ORAL ONCE
Status: COMPLETED | OUTPATIENT
Start: 2025-04-04 | End: 2025-04-04

## 2025-04-04 RX ORDER — ASPIRIN 81 MG/1
81 TABLET, CHEWABLE ORAL ONCE
Status: COMPLETED | OUTPATIENT
Start: 2025-04-04 | End: 2025-04-04

## 2025-04-04 RX ORDER — FUROSEMIDE 20 MG/1
20 TABLET ORAL ONCE
Status: COMPLETED | OUTPATIENT
Start: 2025-04-04 | End: 2025-04-04

## 2025-04-04 RX ORDER — FUROSEMIDE 10 MG/ML
20 INJECTION INTRAMUSCULAR; INTRAVENOUS ONCE
Status: DISCONTINUED | OUTPATIENT
Start: 2025-04-04 | End: 2025-04-04

## 2025-04-04 RX ORDER — SPIRONOLACTONE 25 MG/1
25 TABLET ORAL ONCE
Status: COMPLETED | OUTPATIENT
Start: 2025-04-04 | End: 2025-04-04

## 2025-04-04 RX ADMIN — DIGOXIN 125 MCG: 0.25 INJECTION INTRAMUSCULAR; INTRAVENOUS at 01:56

## 2025-04-04 RX ADMIN — SPIRONOLACTONE 25 MG: 25 TABLET ORAL at 14:42

## 2025-04-04 RX ADMIN — FUROSEMIDE 20 MG: 20 TABLET ORAL at 14:49

## 2025-04-04 RX ADMIN — CLOPIDOGREL BISULFATE 75 MG: 75 TABLET ORAL at 14:49

## 2025-04-04 RX ADMIN — CARVEDILOL 6.25 MG: 3.12 TABLET, FILM COATED ORAL at 14:42

## 2025-04-04 RX ADMIN — ASPIRIN 81 MG: 81 TABLET, CHEWABLE ORAL at 14:49

## 2025-04-04 ASSESSMENT — PAIN SCALES - GENERAL: PAINLEVEL_OUTOF10: 0

## 2025-04-04 NOTE — ED NOTES
Pt up to BR with this RN in wheelchair and voids. Gait is steady. SOB with exertion. States this SOB has been going on for around 2 month per pt report. Pt eats some slices of pizza from [employer] that was provided by sister.

## 2025-04-04 NOTE — PROGRESS NOTES
LADI met with pt and sister at request of ER staff as pt's sister had questions in regards to a hospital bed. SW provided medicare guidelines for insurance to cover hospital bed and went over that procedure with pt and sister. Sister inquires about if a bed could just be rented and SW provided [employer]ar[employer] companies locally that sister could inquire with about bed rental as well. Pt and sister express no other concerns at this time and pt is waiting to be transferred to another facility. Brooklyn Haywood, MSW, LSW 4/4/2025

## 2025-04-04 NOTE — ED NOTES
This nurse attempted to call report and  took [facility] number and they will call us back for report. Per the  \"All the nursed are logged off and taking report\".

## 2025-04-04 NOTE — ED NOTES
Paulo Ventura [phone] would like to be called with any updates. Per patient all of his family members are allowed to be updated.

## 2025-04-04 NOTE — ED NOTES
With any questions or updates patient would like his family updated.  Paulo (son) [phone]  Tricia (dtr) [phone]  Jose F (son) [phone], Jose F may not always answer as he is  but please leave a message.

## 2025-04-06 LAB
INR BLD: 1.8
PROTIME: NORMAL

## 2025-04-08 ENCOUNTER — HOSPITAL ENCOUNTER (OUTPATIENT)
Dept: CARDIAC REHAB | Age: 75
Setting detail: THERAPIES SERIES
Discharge: HOME | End: 2025-04-08

## 2025-04-08 LAB
INR BLD: 1.9
PROTIME: NORMAL

## 2025-04-09 ENCOUNTER — TELEPHONE (OUTPATIENT)
Dept: CARDIOLOGY CLINIC | Age: 75
End: 2025-04-09

## 2025-04-09 DIAGNOSIS — I42.9 CARDIOMYOPATHY, UNSPECIFIED TYPE (HCC): Primary | ICD-10-CM

## 2025-04-09 DIAGNOSIS — I10 ESSENTIAL HYPERTENSION: ICD-10-CM

## 2025-04-09 DIAGNOSIS — I25.83 CORONARY ARTERY DISEASE DUE TO LIPID RICH PLAQUE: ICD-10-CM

## 2025-04-09 DIAGNOSIS — I25.10 CORONARY ARTERY DISEASE DUE TO LIPID RICH PLAQUE: ICD-10-CM

## 2025-04-09 DIAGNOSIS — R06.02 SHORTNESS OF BREATH: ICD-10-CM

## 2025-04-09 DIAGNOSIS — I48.0 PAROXYSMAL ATRIAL FIBRILLATION (HCC): ICD-10-CM

## 2025-04-09 LAB
INR BLD: 2.4
PROTIME: NORMAL

## 2025-04-09 NOTE — TELEPHONE ENCOUNTER
Magda notified per Dr. Steiner- if no ICD put in and is out of the hospital by wed. Well see at noon on wed with cbc/cmp prior- sister verbalized understanding

## 2025-04-09 NOTE — TELEPHONE ENCOUNTER
Belinda Ernandez stopped by office. States that Logan is currently at [facility] and has been since Friday for ICD insertion. Has been giving him a lot of Lasix and put him on Metoprolol. Is planning to discharge him without doing ICD procedure. Magda states that she believes Dr Casanova (?) is the Dr taking care of Logan right now. Wants to know if Dr Steiner agrees with pt being sent home without ICD.     Please call Magda back at [phone]

## 2025-04-10 LAB
INR BLD: 2.6
PROTIME: NORMAL

## 2025-04-11 LAB
INR BLD: 3.1
PROTIME: NORMAL

## 2025-04-14 LAB
INR BLD: 2.2
PROTIME: NORMAL

## 2025-04-15 ENCOUNTER — TELEPHONE (OUTPATIENT)
Dept: CARDIOLOGY CLINIC | Age: 75
End: 2025-04-15

## 2025-04-15 LAB
INR BLD: 1.8
PROTIME: NORMAL

## 2025-04-15 NOTE — TELEPHONE ENCOUNTER
Matt, HIPAA, contacted office.     Had a test today at [facility], unsure of what exactly it was - is not getting much info from [facility] in regards to Logan' condition.     Was planning to be discharged today but now is not, Matt thinks that change has something to do with this testing. Want's Dr Steiner to review chart and inform family of what is going on with Logan.     Cancelled appointment for tomorrow and advised Matt to call back when pt is discharged.

## 2025-04-16 LAB
INR BLD: 1.4
PROTIME: NORMAL

## 2025-04-16 ASSESSMENT — NEW YORK HEART ASSOCIATION (NYHA) CLASSIFICATION: NYHA FUNCTIONAL CLASS: CLASS III

## 2025-04-16 NOTE — FLOWSHEET NOTE
DAYS W/ INSTRUCTION   Goal Status Initial   Progress Towards Goal NOT MET   Not Managed as a Core Component   Date Lipids Drawn 10/28/24   Total 181   HDL 43      Triglycerides 130   Nutrition Intervention   Dietitian Consult No   Staff/Patient Discussion   (FORTHCOMING W/ EDU CLASSES)   Supplemental Nutrition 1 BOOST DAILY   Lipid Clinic Referral No   Nutrition Education   Resource Information Provided Yes  (FORTHCOMING W/ EDU CLASSES)   Education Low sodium diet   Nutrition Goals   Patient Target Goals LDL-C less than 70 and non-HDL-C <100 for those with ASCVD   Patient Treatment Goal PT WILL BE ABLE TO LEARN AND ATTEMPT TO INCORPORATE A LOW SATURATED FAT AND LOW SODIUM DIET W/IN THE NEXT 30 DAYS.   Goal Status Initial   Progress Towards Goal NOT MET D/T ACUTE CHF CHANGES REQUIRING HOSPITALIZATION   Education   Learning Barrier Ready to learn  (HEART KNOWLEDGE TEST FORTHCOMING)   Other Core Component - Hypertension   Hypertension Yes   Is BP WDL Yes   Hypertension Managed as Core Component No   Medications   Resource Information Provided Yes  (HEART FAILURE BOOKLET \"A STRONGER PUMP,\" AND HEART FAILURE MANAGEMENT ZONES HANDOUT, ALONG WITH DAILY WT AND S/S TRACKING FORM GIVEN)   Med(s) Change No   BP Meds YES   ACE/ARB/ARNI Prescribed No   ACE/ARB/ARNI Adherent No/exception   BB Prescribed Yes   BB Adherent Yes   Antiplatelet/Anticoagulant Prescribed Yes   Antiplatelet/Anticoagulant Adherent Yes   Statins/Anti-hyperlipidemic Prescribed Yes   Statins/Anti-hyperlipidemic Adherent Yes   Statins/Anti-hyperlipidemic Intensity Moderate   Other Core Component - Medication Compliance   Medication Compliance Managed as Core Component No   Other Core Component - Other Risk Factor   Other Modifiable Risk Factor Managed as Core Component No   Retired, Read Only Lipid Management Assessment   Stages of Change Relapse

## 2025-04-17 ENCOUNTER — ANTI-COAG VISIT (OUTPATIENT)
Age: 75
End: 2025-04-17
Payer: MEDICARE

## 2025-04-17 VITALS
SYSTOLIC BLOOD PRESSURE: 91 MMHG | DIASTOLIC BLOOD PRESSURE: 62 MMHG | BODY MASS INDEX: 22.75 KG/M2 | WEIGHT: 182 LBS | HEART RATE: 54 BPM

## 2025-04-17 DIAGNOSIS — I10 ESSENTIAL HYPERTENSION: ICD-10-CM

## 2025-04-17 DIAGNOSIS — I48.21 PERMANENT ATRIAL FIBRILLATION (HCC): Primary | ICD-10-CM

## 2025-04-17 DIAGNOSIS — I48.0 PAROXYSMAL ATRIAL FIBRILLATION (HCC): ICD-10-CM

## 2025-04-17 DIAGNOSIS — I42.9 CARDIOMYOPATHY, UNSPECIFIED TYPE (HCC): Primary | ICD-10-CM

## 2025-04-17 DIAGNOSIS — E55.9 VITAMIN D DEFICIENCY: ICD-10-CM

## 2025-04-17 DIAGNOSIS — R06.02 SHORTNESS OF BREATH: ICD-10-CM

## 2025-04-17 LAB
INTERNATIONAL NORMALIZATION RATIO, POC: 1.5
PROTHROMBIN TIME, POC: 0

## 2025-04-17 PROCEDURE — 85610 PROTHROMBIN TIME: CPT

## 2025-04-17 PROCEDURE — 99211 OFF/OP EST MAY X REQ PHY/QHP: CPT

## 2025-04-17 RX ORDER — POTASSIUM CHLORIDE 750 MG/1
10 CAPSULE, EXTENDED RELEASE ORAL 2 TIMES DAILY
COMMUNITY

## 2025-04-17 RX ORDER — ENOXAPARIN SODIUM 100 MG/ML
80 INJECTION SUBCUTANEOUS EVERY 12 HOURS
COMMUNITY
Start: 2025-04-16 | End: 2025-04-23

## 2025-04-17 RX ORDER — METOPROLOL SUCCINATE 25 MG/1
25 TABLET, EXTENDED RELEASE ORAL DAILY
COMMUNITY
Start: 2025-04-17

## 2025-04-17 RX ORDER — TORSEMIDE 20 MG/1
20 TABLET ORAL DAILY
COMMUNITY
Start: 2025-04-17

## 2025-04-17 RX ORDER — ACETAMINOPHEN 160 MG
2000 TABLET,DISINTEGRATING ORAL DAILY
COMMUNITY

## 2025-04-17 NOTE — PROGRESS NOTES
[facility]  Medication Management  ANTICOAGULATION    Referring Provider: Dr Agustin Steiner     GOAL INR: 2.0 - 3.0     TODAY'S INR: 1.5     WARFARIN Dosage: 3 mg on 4/17, 1.5 mg on 4/18, 3 mg on 4/19, 1.5 mg on 4/20, and 3 mg on 4/21; ALSO, he will  continue bridging with Enoxaparin 80 mg injections every 12 hours until INR is above 1.9        INR (no units)   Date Value   04/16/2025 1.40   04/15/2025 1.80   04/14/2025 2.20   04/11/2025 3.10   04/10/2025 2.60   04/09/2025 2.40   04/08/2025 1.90       Hemoglobin   Date Value Ref Range Status   04/04/2025 10.5 (L) 13.5 - 17.5 g/dL Final     Hematocrit   Date Value Ref Range Status   04/04/2025 31.5 (L) 41.0 - 53.0 % Final     ALT   Date Value Ref Range Status   04/04/2025 19 5 - 41 U/L Final     AST   Date Value Ref Range Status   04/04/2025 27 <40 U/L Final       Medication changes:  Stopped taking Aspirin, Hydralazine, Isosorbide, Carvedilol, Furosemide, and several of the herbal/ dietary supplements he had been taking previously  Started Torsemide 20 mg daily, Toprol XL 25 mg daily, Jardiance 10 mg daily, Potassium 10 mEq BID, Aldactone 25 mg daily, and Enoxaparin 80 mg injections every 12 hours until INR is therapeutic on warfarin      Notes:    Fingerstick INR drawn per clinic protocol. Patient states no visible blood in urine and no black tarry stool. Henri was admitted to the [facility] from 4/4/2025 through 4/16/2025 for severe SOB and EF of 25%. He says that they initially discussed ICD placement; however, this was not deemed necessary at the time. He was taken off his warfarin and started on Heparin drip during the early days of his admission. Warfarin was restarted on 4/6/2025, but then stopped again for an additional 3 days (4/12 through 4/14) prior to PET scan on 4/14 and HILTON on 4/15 per Dr. Delbert Graham. He was given an IV dose of Iron Dextran during his admission as well. Henri will follow up with Dr. Steiner on 4/23/2025

## 2025-04-22 ENCOUNTER — ANTI-COAG VISIT (OUTPATIENT)
Age: 75
End: 2025-04-22
Payer: MEDICARE

## 2025-04-22 VITALS
SYSTOLIC BLOOD PRESSURE: 97 MMHG | HEART RATE: 89 BPM | DIASTOLIC BLOOD PRESSURE: 64 MMHG | BODY MASS INDEX: 22.2 KG/M2 | WEIGHT: 177.6 LBS

## 2025-04-22 DIAGNOSIS — I48.21 PERMANENT ATRIAL FIBRILLATION (HCC): Primary | ICD-10-CM

## 2025-04-22 LAB
INTERNATIONAL NORMALIZATION RATIO, POC: 1.4
PROTHROMBIN TIME, POC: 0

## 2025-04-22 PROCEDURE — 99211 OFF/OP EST MAY X REQ PHY/QHP: CPT

## 2025-04-22 PROCEDURE — 85610 PROTHROMBIN TIME: CPT

## 2025-04-22 RX ORDER — WARFARIN SODIUM 3 MG/1
3 TABLET ORAL DAILY
Qty: 90 TABLET | Refills: 3 | Status: SHIPPED | OUTPATIENT
Start: 2025-04-22

## 2025-04-22 NOTE — PROGRESS NOTES
[facility]  Medication Management  ANTICOAGULATION    Referring Provider: Dr Agustin Steiner     GOAL INR: 2.0 - 3.0     TODAY'S INR: 1.4     WARFARIN Dosage: 7.5 mg x 1 dose this morning (4/22/2025); ALSO, he will continue bridging with Enoxaparin 80 mg injections every 12 hours until INR is above 1.9      INR (no units)   Date Value   04/16/2025 1.40   04/15/2025 1.80   04/14/2025 2.20   04/11/2025 3.10   04/10/2025 2.60   04/09/2025 2.40   04/08/2025 1.90     INR,(POC) (no units)   Date Value   04/17/2025 1.5       Hemoglobin   Date Value Ref Range Status   04/04/2025 10.5 (L) 13.5 - 17.5 g/dL Final     Hematocrit   Date Value Ref Range Status   04/04/2025 31.5 (L) 41.0 - 53.0 % Final     ALT   Date Value Ref Range Status   04/04/2025 19 5 - 41 U/L Final     AST   Date Value Ref Range Status   04/04/2025 27 <40 U/L Final       Medication changes:  Stopped taking Aspirin, Hydralazine, Isosorbide, Carvedilol, Furosemide, and several of the herbal/ dietary supplements he had been taking previously  Started Torsemide 20 mg daily, Toprol XL 25 mg daily, Jardiance 10 mg daily, Potassium 10 mEq BID, Aldactone 25 mg daily, and Enoxaparin 80 mg injections every 12 hours until INR is therapeutic on warfarin      Notes:     Fingerstick INR drawn per clinic protocol. Patient states no visible blood in urine and no black tarry stool. As discussed last week, Henri had been admitted to the [facility] from 4/4/2025 through 4/16/2025 for severe SOB, EF of 25%, and possible ICD placement. He was taken off his warfarin and started on a Heparin drip until cardiology decided against ICD placement at that time. Warfarin was restarted on 4/6/2025, but then stopped again for an additional 3 days (4/12 through 4/14) prior to PET scan on 4/14 and HILTON on 4/15 per Dr. Delbert GrahamJayde Álvarez will follow up with Dr. Steiner tomorrow and has an appointment with Dr. Verde, cardiologist at the [facility] on

## 2025-04-23 ENCOUNTER — ANTI-COAG VISIT (OUTPATIENT)
Age: 75
End: 2025-04-23
Payer: MEDICARE

## 2025-04-23 ENCOUNTER — OFFICE VISIT (OUTPATIENT)
Dept: CARDIOLOGY CLINIC | Age: 75
End: 2025-04-23
Payer: MEDICARE

## 2025-04-23 VITALS
SYSTOLIC BLOOD PRESSURE: 107 MMHG | DIASTOLIC BLOOD PRESSURE: 74 MMHG | HEART RATE: 88 BPM | BODY MASS INDEX: 22.1 KG/M2 | WEIGHT: 176.8 LBS

## 2025-04-23 VITALS — WEIGHT: 176 LBS | SYSTOLIC BLOOD PRESSURE: 90 MMHG | DIASTOLIC BLOOD PRESSURE: 60 MMHG | BODY MASS INDEX: 22 KG/M2

## 2025-04-23 DIAGNOSIS — I25.10 CORONARY ARTERY DISEASE DUE TO LIPID RICH PLAQUE: ICD-10-CM

## 2025-04-23 DIAGNOSIS — I42.9 CARDIOMYOPATHY, UNSPECIFIED TYPE (HCC): Primary | ICD-10-CM

## 2025-04-23 DIAGNOSIS — I48.0 PAROXYSMAL ATRIAL FIBRILLATION (HCC): ICD-10-CM

## 2025-04-23 DIAGNOSIS — I25.83 CORONARY ARTERY DISEASE DUE TO LIPID RICH PLAQUE: ICD-10-CM

## 2025-04-23 LAB
INTERNATIONAL NORMALIZATION RATIO, POC: 1.9
PROTHROMBIN TIME, POC: 0

## 2025-04-23 PROCEDURE — 3078F DIAST BP <80 MM HG: CPT | Performed by: INTERNAL MEDICINE

## 2025-04-23 PROCEDURE — 3074F SYST BP LT 130 MM HG: CPT | Performed by: INTERNAL MEDICINE

## 2025-04-23 PROCEDURE — 1159F MED LIST DOCD IN RCRD: CPT | Performed by: INTERNAL MEDICINE

## 2025-04-23 PROCEDURE — 85610 PROTHROMBIN TIME: CPT | Performed by: PHARMACIST

## 2025-04-23 PROCEDURE — 99214 OFFICE O/P EST MOD 30 MIN: CPT | Performed by: INTERNAL MEDICINE

## 2025-04-23 PROCEDURE — 1123F ACP DISCUSS/DSCN MKR DOCD: CPT | Performed by: INTERNAL MEDICINE

## 2025-04-23 PROCEDURE — 99211 OFF/OP EST MAY X REQ PHY/QHP: CPT | Performed by: PHARMACIST

## 2025-04-23 NOTE — PROGRESS NOTES
[facility]  Medication Management  ANTICOAGULATION    Referring Provider: Dr Agustin Steiner     GOAL INR: 2.0-3.0     TODAY'S INR: 1.9     WARFARIN Dosage: 4.5 mg x 1, then resume 3 mg M/Th, 1.5 mg all other days    INR (no units)   Date Value   2025 1.40   04/15/2025 1.80   2025 2.20   2025 3.10   04/10/2025 2.60   2025 2.40   2025 1.90     INR,(POC) (no units)   Date Value   2025 1.9   2025 1.4   2025 1.5       Hemoglobin   Date Value Ref Range Status   2025 10.5 (L) 13.5 - 17.5 g/dL Final     Hematocrit   Date Value Ref Range Status   2025 31.5 (L) 41.0 - 53.0 % Final     ALT   Date Value Ref Range Status   2025 19 5 - 41 U/L Final     AST   Date Value Ref Range Status   2025 27 <40 U/L Final       Medication changes:  Stopping enoxaparin    Notes:    Fingerstick INR drawn per clinic protocol. Patient states no visible blood in urine, black tarry stool, falls or ER visits and denies any missed or extra doses of warfarin. He will be stopping enoxaparin at this time. No change in other maintenance medications or in diet. Answered dietary questions from his girlfriend. INR is still slightly subtherapeutic so will order 4.5 mg this evening prior to resuming his previous dosing of 3 mg M/Th, 1.5 mg all other days and will recheck INR in 1 week. Patient acknowledges working in consult agreement with pharmacist as referred by his/her physician.    For Pharmacy Admin Tracking Only    Intervention Detail: Adherence Monitorin and Dose Adjustment: 1, reason: Therapy Optimization  Total # of Interventions Recommended: 2  Total # of Interventions Accepted: 2  Time Spent (min): 20    Jacob Peñaloza, Tami 2025 12:31 PM

## 2025-04-23 NOTE — PROGRESS NOTES
Ov DR Steiner follow up   Form hospital   Cardiomyopathy   No chest pain   Sl sob but not  Doing much.   But sob is improved  No dizziness   No edema  Had HILTON done    Last dose of lovenox today    Bedside echo done    See in 1 week

## 2025-04-25 ASSESSMENT — NEW YORK HEART ASSOCIATION (NYHA) CLASSIFICATION: NYHA FUNCTIONAL CLASS: CLASS III

## 2025-04-25 NOTE — FLOWSHEET NOTE
04/25/25 1409   Treatment Diagnosis   Treatment Diagnosis 1 PCI   PCI Date 01/03/25   Treatment Diagnosis 2 Cardiomyopathy   Referral Date 01/07/25   Significant Cardiovascular History Chronic atrial fibrillation;History of heart failure;Peripheral arterial disease;Previous CABG;Previous PCI   NYHA Heart Failure Classification Class III   Individual Treatment Plan   ITP Visit Type Discharge, did not complete program   Visit #/Total Visits 4   EF% 25 %   Risk Stratification High   Supplemental Oxygen   Oxygen Use No   Exercise Assessment   Stages of Change Relapse   Assisted Devices None  (Low fall risk)   Data Measured Before Test   Heart Rate   (EXIT GXT NOT COMPLETED D/T PT D/C W/ NO F/U)   Data Measured at Peak   Peak RPE 13   Data Measured at 5 Minutes After Test   Comments POOR FUNCITONAL CAPACITY BUT GOOD OVERALL TOLERANCE TO SUBMAX EX TEST   Exercise Intervention/Prescription   Mode Treadmill;Track;Stepper;Ergometer;Bike;Resistance training   Frequency per week 1-2 D/WK CR   Duration Per Session 30-40   Intensity METS       2.2-2.9   Progression GRADUALLY PROGRESS FITTRx TO GOAL W/IN THE NEXT 30 DAYS   Symptoms with Exercise Shortness of breath;Leg pain   Target Heart Rate 108-120   Resistance Training Yes   RPE 11-14   Exercise Activity at Home   Type INTERVAL WALKING, STATIONARY CYCLE   Frequency PROGRESS GRADUALLY TO 3-4 DAYS / WK   Duration 15-30   Resistance Training Yes   Exercise Education   Education Exercise safety;Equipment orientation;Warm up/cool down   Exercise Goals   Patient Target Goals Individual exercise RX;Maintain exercise and activity at a moderate intensity   Patient Treatment Goal PT WILL BE ABLE TO PROGRESS FITTRx  USING INTERVAL CARDIO TRAINING UNTIL >20 MIN. OF CONTINUOUS CARDIO TOLERATED W/IN THE NEXT 30 DAYS   Goal Status   (NOT MET)   Psychosocial Assessment   Stages of Change Relapse  (PHQ-9 & IVONNE-7 FORTHCOMING)   Psychosocial Intervention   Interventions No intervention

## 2025-04-29 NOTE — PROGRESS NOTES
Agustin Steiner MD  [facility]  [facility]  [address] [id number] [phone]        2025        Alondra Cavazos MD  [address] [place] [address]     RE:  LENNIE MCFARLAND  :  1950    Dear Dr. Cavazos:    CHIEF COMPLAINT:    Severe cardiomyopathy with an EF of 15%.  Severe mitral regurgitation.  Severe tricuspid regurgitation.  Currently being evaluated by [facility].    HISTORY OF PRESENT ILLNESS:  I had the pleasure of meeting with Lennie Mcfarland in our office on 2025.  I trust you received my full H and P from April 3, 2025.  He was admitted at [facility] on 2025 to 2025.  During that time, he was diuresed aggressively and his edema did resolve.  Because of an allergy to ACE inhibitors, he was not placed on Entresto.  He did have a HILTON that showed severe mitral regurgitation.  He had a PET viability study that showed mild ischemia in the territory of the RCA with small scars in the lateral circumflex and RCA.  He also was found to have a possible clot in the atrium, left atrial thrombus.  He was continued on Coumadin and Plavix.  Of note, EP did not see him and he was not given a LifeVest.    They evidently discussed him in Structural Heart and I see a note in the chart from today that the patient agreed to come in on  and  for evaluation for tricuspid valve clip.  I believe he sees Electrophysiology on .    At this time, he is doing relatively well.  He is going to try to go to cardiac rehab again, although he is very weak.  He denies any chest pain.  He is short of breath, but not at rest, and he is not again doing much physical activity. He has no edema.  He has had no PND or orthopnea.  He has been on Coumadin and Lovenox and is currently therapeutic with his Coumadin.    He has had no syncope or near-syncope.  He does not take his blood pressure at home and

## 2025-04-30 ENCOUNTER — HOSPITAL ENCOUNTER (OUTPATIENT)
Age: 75
Discharge: HOME | End: 2025-04-30
Payer: MEDICARE

## 2025-04-30 ENCOUNTER — ANTI-COAG VISIT (OUTPATIENT)
Age: 75
End: 2025-04-30
Payer: MEDICARE

## 2025-04-30 ENCOUNTER — OFFICE VISIT (OUTPATIENT)
Dept: CARDIOLOGY CLINIC | Age: 75
End: 2025-04-30
Payer: MEDICARE

## 2025-04-30 VITALS
SYSTOLIC BLOOD PRESSURE: 82 MMHG | HEART RATE: 44 BPM | WEIGHT: 172.6 LBS | DIASTOLIC BLOOD PRESSURE: 49 MMHG | BODY MASS INDEX: 21.57 KG/M2

## 2025-04-30 DIAGNOSIS — I25.10 CORONARY ARTERY DISEASE DUE TO LIPID RICH PLAQUE: ICD-10-CM

## 2025-04-30 DIAGNOSIS — I42.9 CARDIOMYOPATHY, UNSPECIFIED TYPE (HCC): Primary | ICD-10-CM

## 2025-04-30 DIAGNOSIS — I10 ESSENTIAL HYPERTENSION: ICD-10-CM

## 2025-04-30 DIAGNOSIS — I25.83 CORONARY ARTERY DISEASE DUE TO LIPID RICH PLAQUE: ICD-10-CM

## 2025-04-30 DIAGNOSIS — I42.9 CARDIOMYOPATHY, UNSPECIFIED TYPE (HCC): ICD-10-CM

## 2025-04-30 DIAGNOSIS — I48.0 PAROXYSMAL ATRIAL FIBRILLATION (HCC): ICD-10-CM

## 2025-04-30 LAB
ALBUMIN SERPL-MCNC: 4.3 G/DL (ref 3.5–5.2)
ALBUMIN/GLOB SERPL: 1.6 {RATIO} (ref 1–2.5)
ALP SERPL-CCNC: 98 U/L (ref 40–129)
ALT SERPL-CCNC: 21 U/L (ref 5–41)
ANION GAP SERPL CALCULATED.3IONS-SCNC: 13 MMOL/L (ref 9–17)
AST SERPL-CCNC: 23 U/L
BASOPHILS # BLD: 0.05 K/UL (ref 0–0.2)
BASOPHILS NFR BLD: 1 % (ref 0–2)
BILIRUB SERPL-MCNC: 1 MG/DL (ref 0.3–1.2)
BUN SERPL-MCNC: 51 MG/DL (ref 8–23)
CALCIUM SERPL-MCNC: 9.6 MG/DL (ref 8.6–10.4)
CHLORIDE SERPL-SCNC: 102 MMOL/L (ref 98–107)
CO2 SERPL-SCNC: 25 MMOL/L (ref 20–31)
CREAT SERPL-MCNC: 1.5 MG/DL (ref 0.7–1.2)
EOSINOPHIL # BLD: 0.11 K/UL (ref 0–0.4)
EOSINOPHILS RELATIVE PERCENT: 2 % (ref 0–5)
ERYTHROCYTE [DISTWIDTH] IN BLOOD BY AUTOMATED COUNT: 23.2 % (ref 12.1–15.2)
GFR, ESTIMATED: 49 ML/MIN/1.73M2
GLUCOSE SERPL-MCNC: 91 MG/DL (ref 70–99)
HCT VFR BLD AUTO: 38.5 % (ref 41–53)
HGB BLD-MCNC: 12.4 G/DL (ref 13.5–17.5)
IMM GRANULOCYTES # BLD AUTO: 0.01 K/UL (ref 0–0.3)
IMM GRANULOCYTES NFR BLD: 0 % (ref 0–5)
INTERNATIONAL NORMALIZATION RATIO, POC: 1.8
LYMPHOCYTES NFR BLD: 1.1 K/UL (ref 1–4.8)
LYMPHOCYTES RELATIVE PERCENT: 19 % (ref 13–44)
MCH RBC QN AUTO: 26.2 PG (ref 26–34)
MCHC RBC AUTO-ENTMCNC: 32.2 G/DL (ref 31–37)
MCV RBC AUTO: 81.2 FL (ref 80–100)
MONOCYTES NFR BLD: 0.7 K/UL (ref 0–1)
MONOCYTES NFR BLD: 12 % (ref 5–9)
MORPHOLOGY: ABNORMAL
NEUTROPHILS NFR BLD: 66 % (ref 39–75)
NEUTS SEG NFR BLD: 3.86 K/UL (ref 2.1–6.5)
PLATELET # BLD AUTO: 263 K/UL (ref 140–450)
PMV BLD AUTO: 10.1 FL (ref 6–12)
POTASSIUM SERPL-SCNC: 4.9 MMOL/L (ref 3.7–5.3)
PROT SERPL-MCNC: 7 G/DL (ref 6.4–8.3)
PROTHROMBIN TIME, POC: 0
RBC # BLD AUTO: 4.74 M/UL (ref 4.5–5.9)
SODIUM SERPL-SCNC: 140 MMOL/L (ref 135–144)
WBC OTHER # BLD: 5.8 K/UL (ref 3.5–11)

## 2025-04-30 PROCEDURE — 80053 COMPREHEN METABOLIC PANEL: CPT

## 2025-04-30 PROCEDURE — 1123F ACP DISCUSS/DSCN MKR DOCD: CPT | Performed by: NURSE PRACTITIONER

## 2025-04-30 PROCEDURE — 85025 COMPLETE CBC W/AUTO DIFF WBC: CPT

## 2025-04-30 PROCEDURE — 36415 COLL VENOUS BLD VENIPUNCTURE: CPT

## 2025-04-30 PROCEDURE — 3074F SYST BP LT 130 MM HG: CPT | Performed by: NURSE PRACTITIONER

## 2025-04-30 PROCEDURE — 3078F DIAST BP <80 MM HG: CPT | Performed by: NURSE PRACTITIONER

## 2025-04-30 PROCEDURE — 99213 OFFICE O/P EST LOW 20 MIN: CPT | Performed by: NURSE PRACTITIONER

## 2025-04-30 RX ORDER — MULTIVITAMIN WITH IRON
1 TABLET ORAL DAILY
COMMUNITY

## 2025-04-30 RX ORDER — MULTIVIT WITH MINERALS/LUTEIN
400 TABLET ORAL DAILY
COMMUNITY

## 2025-04-30 RX ORDER — ASCORBIC ACID 500 MG
500 TABLET ORAL DAILY
COMMUNITY

## 2025-04-30 NOTE — PROGRESS NOTES
[facility]  Medication Management  ANTICOAGULATION    Referring Provider: Dr Agustin Steiner     GOAL INR: 2.0-3.0     TODAY'S INR: 1.8     WARFARIN Dosage: Increase dose to 3 mg MWF, 1.5 mg all other days    INR (no units)   Date Value   2025 1.40   04/15/2025 1.80   2025 2.20   2025 3.10   04/10/2025 2.60   2025 2.40   2025 1.90     INR,(POC) (no units)   Date Value   2025 1.8   2025 1.9   2025 1.4   2025 1.5       Hemoglobin   Date Value Ref Range Status   2025 10.5 (L) 13.5 - 17.5 g/dL Final     Hematocrit   Date Value Ref Range Status   2025 31.5 (L) 41.0 - 53.0 % Final     ALT   Date Value Ref Range Status   2025 19 5 - 41 U/L Final     AST   Date Value Ref Range Status   2025 27 <40 U/L Final       Medication changes:  Restarted Vitamin C/D/E, multivitamin and started drinking 1 Ensure daily    Notes:    Fingerstick INR drawn per clinic protocol. Patient states no visible blood in urine, black tarry stool, falls or ER visits and denies any missed or extra doses of warfarin. Advises he restarted Vitamins C, D and E, his multivitamin and also started drinking one Ensure daily. Also states he has been eating lots of vegetables. No change in other maintenance medications or in diet. INR is still a little subtherapeutic and with ingesting more Vitamin K recently, a dose increase (11%) to 3 mg MWF, 1.5 mg all other days will be ordered without a boost and will recheck INR in 2 weeks. Patient acknowledges working in consult agreement with pharmacist as referred by his/her physician.    For Pharmacy Admin Tracking Only    Intervention Detail: Adherence Monitorin and Dose Adjustment: 1, reason: Therapy Optimization  Total # of Interventions Recommended: 2  Total # of Interventions Accepted: 2  Time Spent (min): 20    Jacob Peñaloza, PharmD 2025 11:05 AM

## 2025-04-30 NOTE — PATIENT INSTRUCTIONS
No change in medications.  Will increase fluid intake, 1 to 2 liters per day.  Will check daily weights and notify office if gains more than 2 to 3 pounds in 1 day or 5 pounds over 3 days.  Will repeat BMP in 1 week.  Will follow up in 1 week.

## 2025-05-05 VITALS
HEIGHT: 75 IN | DIASTOLIC BLOOD PRESSURE: 48 MMHG | BODY MASS INDEX: 21.57 KG/M2 | RESPIRATION RATE: 16 BRPM | OXYGEN SATURATION: 99 % | SYSTOLIC BLOOD PRESSURE: 92 MMHG | HEART RATE: 67 BPM

## 2025-05-05 NOTE — PROGRESS NOTES
Henri here for follow up with CBC/CMP    
[facility]  [address] [place] [id number]  Phone: [phone], Fax: [phone]

## 2025-05-07 ENCOUNTER — APPOINTMENT (OUTPATIENT)
Age: 75
End: 2025-05-07
Payer: MEDICARE

## 2025-05-07 ENCOUNTER — HOSPITAL ENCOUNTER (OUTPATIENT)
Age: 75
Discharge: HOME | End: 2025-05-07
Payer: MEDICARE

## 2025-05-07 ENCOUNTER — OFFICE VISIT (OUTPATIENT)
Dept: CARDIOLOGY CLINIC | Age: 75
End: 2025-05-07
Payer: MEDICARE

## 2025-05-07 VITALS
WEIGHT: 173 LBS | OXYGEN SATURATION: 98 % | RESPIRATION RATE: 18 BRPM | SYSTOLIC BLOOD PRESSURE: 92 MMHG | DIASTOLIC BLOOD PRESSURE: 42 MMHG | HEART RATE: 37 BPM | BODY MASS INDEX: 21.62 KG/M2

## 2025-05-07 DIAGNOSIS — I25.10 CORONARY ARTERY DISEASE DUE TO LIPID RICH PLAQUE: ICD-10-CM

## 2025-05-07 DIAGNOSIS — I10 ESSENTIAL HYPERTENSION: ICD-10-CM

## 2025-05-07 DIAGNOSIS — I25.83 CORONARY ARTERY DISEASE DUE TO LIPID RICH PLAQUE: ICD-10-CM

## 2025-05-07 DIAGNOSIS — R06.02 SHORTNESS OF BREATH: ICD-10-CM

## 2025-05-07 DIAGNOSIS — I42.9 CARDIOMYOPATHY, UNSPECIFIED TYPE (HCC): Primary | ICD-10-CM

## 2025-05-07 DIAGNOSIS — I42.9 CARDIOMYOPATHY, UNSPECIFIED TYPE (HCC): ICD-10-CM

## 2025-05-07 LAB
ANION GAP SERPL CALCULATED.3IONS-SCNC: 15 MMOL/L (ref 9–17)
BUN SERPL-MCNC: 44 MG/DL (ref 8–23)
CALCIUM SERPL-MCNC: 9.6 MG/DL (ref 8.6–10.4)
CHLORIDE SERPL-SCNC: 97 MMOL/L (ref 98–107)
CO2 SERPL-SCNC: 25 MMOL/L (ref 20–31)
CREAT SERPL-MCNC: 1.6 MG/DL (ref 0.7–1.2)
GFR, ESTIMATED: 45 ML/MIN/1.73M2
GLUCOSE SERPL-MCNC: 93 MG/DL (ref 70–99)
POTASSIUM SERPL-SCNC: 5.1 MMOL/L (ref 3.7–5.3)
SODIUM SERPL-SCNC: 137 MMOL/L (ref 135–144)

## 2025-05-07 PROCEDURE — 1123F ACP DISCUSS/DSCN MKR DOCD: CPT | Performed by: NURSE PRACTITIONER

## 2025-05-07 PROCEDURE — 1160F RVW MEDS BY RX/DR IN RCRD: CPT | Performed by: NURSE PRACTITIONER

## 2025-05-07 PROCEDURE — 80048 BASIC METABOLIC PNL TOTAL CA: CPT

## 2025-05-07 PROCEDURE — 1159F MED LIST DOCD IN RCRD: CPT | Performed by: NURSE PRACTITIONER

## 2025-05-07 PROCEDURE — 3074F SYST BP LT 130 MM HG: CPT | Performed by: NURSE PRACTITIONER

## 2025-05-07 PROCEDURE — 3078F DIAST BP <80 MM HG: CPT | Performed by: NURSE PRACTITIONER

## 2025-05-07 PROCEDURE — 99213 OFFICE O/P EST LOW 20 MIN: CPT | Performed by: NURSE PRACTITIONER

## 2025-05-07 PROCEDURE — 36415 COLL VENOUS BLD VENIPUNCTURE: CPT

## 2025-05-07 NOTE — PROGRESS NOTES
Ov with Alissno for 1 week f/u   Some dizziness/weakness today   Yesterday left hand was numb   But had used it a lot   No sob   \"Nothing hurts\"

## 2025-05-07 NOTE — PROGRESS NOTES
[facility]  [address] [place] [address]  Phone: [phone], Fax: [phone]     Patient: Logan Ventura  : 1950  Date of Visit: May 7, 2025    REASON FOR VISIT / CONSULTATION: Atrial Fibrillation and 1 week follow up      History of Present Illness:        Alondra Gomez MD    We had the pleasure of seeing Logan Ventura and his girlfriend, Vita, in our office today. Mr. Ventura is a pleasant 74 y.o. gentleman with a history of coronary artery disease S/P CABG x 2, severe cardiomyopathy with EF 15%, hypertension, hyperlipidemia, atrial fibrillation, carotid stenosis, congestive heart failure and vitamin D deficiency.    He was admitted at [facility] on 2025 to 2025.  During that time, he was diuresed aggressively and his edema did resolve.  Because of an allergy to ACE inhibitors, he was not placed on Entresto.  He did have a HILTON that showed severe mitral regurgitation.  He had a PET viability study that showed mild ischemia in the territory of the RCA with small scars in the lateral circumflex and RCA.  He also was found to have a possible clot in the atrium, left atrial thrombus.  He was continued on Coumadin and Plavix.  Of note, EP did not see him and he was not given a LifeVest.     They evidently discussed him in Structural Heart and he agreed to come in on  and  for evaluation for tricuspid valve clip.  He think he sees Electrophysiology on 2025.     At this time, he is doing relatively well.  He denies any chest pain.  He is short of breath, but not at rest, and he is trying to walk more.  He has a watch that monitors his steps. He has no edema.  He has had no PND or orthopnea.  He is on Coumadin and being monitored by [facility] Coumadin clinic.    He presents today for 1 week follow up with repeat BMP to monitor his potassium and renal function.  He is watching his weight at home that has been around

## 2025-05-14 ENCOUNTER — OFFICE VISIT (OUTPATIENT)
Dept: CARDIOLOGY CLINIC | Age: 75
End: 2025-05-14
Payer: MEDICARE

## 2025-05-14 ENCOUNTER — HOSPITAL ENCOUNTER (OUTPATIENT)
Age: 75
Discharge: HOME | End: 2025-05-14
Payer: MEDICARE

## 2025-05-14 ENCOUNTER — ANTI-COAG VISIT (OUTPATIENT)
Age: 75
End: 2025-05-14
Payer: MEDICARE

## 2025-05-14 VITALS
WEIGHT: 176 LBS | SYSTOLIC BLOOD PRESSURE: 99 MMHG | DIASTOLIC BLOOD PRESSURE: 69 MMHG | HEART RATE: 58 BPM | BODY MASS INDEX: 22 KG/M2

## 2025-05-14 VITALS
RESPIRATION RATE: 16 BRPM | HEART RATE: 60 BPM | SYSTOLIC BLOOD PRESSURE: 109 MMHG | HEIGHT: 75 IN | DIASTOLIC BLOOD PRESSURE: 54 MMHG | BODY MASS INDEX: 21.88 KG/M2 | WEIGHT: 176 LBS | OXYGEN SATURATION: 98 %

## 2025-05-14 DIAGNOSIS — R06.02 SHORTNESS OF BREATH: ICD-10-CM

## 2025-05-14 DIAGNOSIS — I25.10 CORONARY ARTERY DISEASE DUE TO LIPID RICH PLAQUE: ICD-10-CM

## 2025-05-14 DIAGNOSIS — I10 ESSENTIAL HYPERTENSION: ICD-10-CM

## 2025-05-14 DIAGNOSIS — I25.83 CORONARY ARTERY DISEASE DUE TO LIPID RICH PLAQUE: ICD-10-CM

## 2025-05-14 DIAGNOSIS — I42.9 CARDIOMYOPATHY, UNSPECIFIED TYPE (HCC): Primary | ICD-10-CM

## 2025-05-14 DIAGNOSIS — I42.9 CARDIOMYOPATHY, UNSPECIFIED TYPE (HCC): ICD-10-CM

## 2025-05-14 LAB
ANION GAP SERPL CALCULATED.3IONS-SCNC: 11 MMOL/L (ref 9–17)
BUN SERPL-MCNC: 41 MG/DL (ref 8–23)
CALCIUM SERPL-MCNC: 9.4 MG/DL (ref 8.6–10.4)
CHLORIDE SERPL-SCNC: 103 MMOL/L (ref 98–107)
CO2 SERPL-SCNC: 27 MMOL/L (ref 20–31)
CREAT SERPL-MCNC: 1.3 MG/DL (ref 0.7–1.2)
GFR, ESTIMATED: 58 ML/MIN/1.73M2
GLUCOSE SERPL-MCNC: 81 MG/DL (ref 70–99)
INTERNATIONAL NORMALIZATION RATIO, POC: 1.9
POTASSIUM SERPL-SCNC: 4.3 MMOL/L (ref 3.7–5.3)
PROTHROMBIN TIME, POC: 0
SODIUM SERPL-SCNC: 141 MMOL/L (ref 135–144)

## 2025-05-14 PROCEDURE — 3074F SYST BP LT 130 MM HG: CPT | Performed by: NURSE PRACTITIONER

## 2025-05-14 PROCEDURE — 85610 PROTHROMBIN TIME: CPT | Performed by: PHARMACIST

## 2025-05-14 PROCEDURE — 36415 COLL VENOUS BLD VENIPUNCTURE: CPT

## 2025-05-14 PROCEDURE — 99212 OFFICE O/P EST SF 10 MIN: CPT | Performed by: NURSE PRACTITIONER

## 2025-05-14 PROCEDURE — 1160F RVW MEDS BY RX/DR IN RCRD: CPT | Performed by: NURSE PRACTITIONER

## 2025-05-14 PROCEDURE — 80048 BASIC METABOLIC PNL TOTAL CA: CPT

## 2025-05-14 PROCEDURE — 99211 OFF/OP EST MAY X REQ PHY/QHP: CPT | Performed by: PHARMACIST

## 2025-05-14 PROCEDURE — 1123F ACP DISCUSS/DSCN MKR DOCD: CPT | Performed by: NURSE PRACTITIONER

## 2025-05-14 PROCEDURE — 1159F MED LIST DOCD IN RCRD: CPT | Performed by: NURSE PRACTITIONER

## 2025-05-14 PROCEDURE — 3078F DIAST BP <80 MM HG: CPT | Performed by: NURSE PRACTITIONER

## 2025-05-14 NOTE — PROGRESS NOTES
[facility]  Medication Management  ANTICOAGULATION    Referring Provider: Dr Agustin Steiner     GOAL INR: 2.0-3.0     TODAY'S INR: 1.9     WARFARIN Dosage: Increase dose to 3 mg MWFSa, 1.5 mg all other days    INR (no units)   Date Value   2025 1.40   04/15/2025 1.80   2025 2.20   2025 3.10   04/10/2025 2.60   2025 2.40   2025 1.90     INR,(POC) (no units)   Date Value   2025 1.9   2025 1.8   2025 1.9   2025 1.4   2025 1.5       Hemoglobin   Date Value Ref Range Status   2025 12.4 (L) 13.5 - 17.5 g/dL Final     Hematocrit   Date Value Ref Range Status   2025 38.5 (L) 41.0 - 53.0 % Final     ALT   Date Value Ref Range Status   2025 21 5 - 41 U/L Final     AST   Date Value Ref Range Status   2025 23 <40 U/L Final       Medication changes:  None     Notes:    Fingerstick INR drawn per clinic protocol. Patient states no visible blood in urine, black tarry stool, falls or ER visits and denies any missed or extra doses of warfarin. No change in medications or diet. INR is still slightly subtherapeutic so will increase his dose again to 1.5 mg SuTT, 3 mg all other days and will recheck INR in 2 weeks. Patient acknowledges working in consult agreement with pharmacist as referred by his/her physician.    For Pharmacy Admin Tracking Only    Intervention Detail: Adherence Monitorin and Dose Adjustment: 1, reason: Therapy Optimization  Total # of Interventions Recommended: 2  Total # of Interventions Accepted: 2  Time Spent (min): 20    Jacob Peñaloza, PharmD 2025 10:19 AM

## 2025-05-14 NOTE — PROGRESS NOTES
Pt in office for a f/u with Alisson.     No chest pain or dizziness, no sob.   Only gets sob with lots of exertion.   Has been trying to get more steps in.     Did not bring med list. \"Everything is the same as last week\"

## 2025-05-19 NOTE — PROGRESS NOTES
[facility]  [address] [place] [address]  Phone: [phone], Fax: [phone]     Patient: Logan Ventura  : 1950  Date of Visit: May 19, 2025    REASON FOR VISIT / CONSULTATION: 1 week Follow-Up      History of Present Illness:        DeaAlondra Wilder MD    We had the pleasure of seeing Logan Ventura and his girlfriend, Vita, in our office today. Mr. Ventura is a pleasant 74 y.o. gentleman with a history of coronary artery disease S/P CABG x 2, severe cardiomyopathy with EF 15%, hypertension, hyperlipidemia, atrial fibrillation, carotid stenosis, congestive heart failure and vitamin D deficiency.    He was admitted at [facility] on 2025 to 2025.  During that time, he was diuresed aggressively and his edema did resolve.  Because of an allergy to ACE inhibitors, he was not placed on Entresto.  He did have a HILTON that showed severe mitral regurgitation.  He had a PET viability study that showed mild ischemia in the territory of the RCA with small scars in the lateral circumflex and RCA.  He also was found to have a possible clot in the atrium, left atrial thrombus.  He was continued on Coumadin and Plavix.  Of note, EP did not see him and he was not given a LifeVest.     They evidently discussed him in Structural Heart and he agreed to come in on  and  for evaluation for tricuspid valve clip.  He think he sees Electrophysiology on 2025.     At this time, he is doing relatively well.  He denies any chest pain.  He only gets short of breath with lots of exertion.  He is trying to get more steps in and is monitoring on his watch. He has no edema.  He has had no PND or orthopnea.  He is on Coumadin and being monitored by [facility] Coumadin clinic.    He presents today for 1 week follow up with repeat BMP to monitor his potassium and renal function.  He is watching his weight at home that has been around 172 to 173

## 2025-05-21 ENCOUNTER — HOSPITAL ENCOUNTER (OUTPATIENT)
Age: 75
Discharge: HOME | End: 2025-05-21
Payer: MEDICARE

## 2025-05-21 DIAGNOSIS — I42.9 CARDIOMYOPATHY, UNSPECIFIED TYPE (HCC): ICD-10-CM

## 2025-05-21 DIAGNOSIS — I25.83 CORONARY ARTERY DISEASE DUE TO LIPID RICH PLAQUE: ICD-10-CM

## 2025-05-21 DIAGNOSIS — I10 ESSENTIAL HYPERTENSION: ICD-10-CM

## 2025-05-21 DIAGNOSIS — R06.02 SHORTNESS OF BREATH: ICD-10-CM

## 2025-05-21 DIAGNOSIS — I25.10 CORONARY ARTERY DISEASE DUE TO LIPID RICH PLAQUE: ICD-10-CM

## 2025-05-21 LAB
ANION GAP SERPL CALCULATED.3IONS-SCNC: 12 MMOL/L (ref 9–17)
BUN SERPL-MCNC: 44 MG/DL (ref 8–23)
CALCIUM SERPL-MCNC: 9.2 MG/DL (ref 8.6–10.4)
CHLORIDE SERPL-SCNC: 101 MMOL/L (ref 98–107)
CO2 SERPL-SCNC: 25 MMOL/L (ref 20–31)
CREAT SERPL-MCNC: 1.4 MG/DL (ref 0.7–1.2)
GFR, ESTIMATED: 53 ML/MIN/1.73M2
GLUCOSE SERPL-MCNC: 132 MG/DL (ref 70–99)
POTASSIUM SERPL-SCNC: 4.8 MMOL/L (ref 3.7–5.3)
SODIUM SERPL-SCNC: 138 MMOL/L (ref 135–144)

## 2025-05-21 PROCEDURE — 36415 COLL VENOUS BLD VENIPUNCTURE: CPT

## 2025-05-21 PROCEDURE — 80048 BASIC METABOLIC PNL TOTAL CA: CPT

## 2025-05-22 ENCOUNTER — HOSPITAL ENCOUNTER (EMERGENCY)
Age: 75
Discharge: HOME | End: 2025-05-22
Attending: EMERGENCY MEDICINE
Payer: MEDICARE

## 2025-05-22 VITALS
RESPIRATION RATE: 16 BRPM | HEART RATE: 59 BPM | DIASTOLIC BLOOD PRESSURE: 61 MMHG | SYSTOLIC BLOOD PRESSURE: 119 MMHG | TEMPERATURE: 97.9 F | OXYGEN SATURATION: 97 % | HEIGHT: 75 IN | BODY MASS INDEX: 22.04 KG/M2 | WEIGHT: 177.3 LBS

## 2025-05-22 DIAGNOSIS — S40.011A HEMATOMA OF RIGHT SHOULDER: Primary | ICD-10-CM

## 2025-05-22 PROCEDURE — 99282 EMERGENCY DEPT VISIT SF MDM: CPT

## 2025-05-22 ASSESSMENT — PAIN DESCRIPTION - ORIENTATION: ORIENTATION: RIGHT

## 2025-05-22 ASSESSMENT — PAIN DESCRIPTION - PAIN TYPE: TYPE: ACUTE PAIN

## 2025-05-22 ASSESSMENT — PAIN DESCRIPTION - DESCRIPTORS: DESCRIPTORS: ACHING

## 2025-05-22 ASSESSMENT — PAIN DESCRIPTION - LOCATION: LOCATION: SHOULDER

## 2025-05-22 ASSESSMENT — PAIN SCALES - GENERAL: PAINLEVEL_OUTOF10: 8

## 2025-05-22 ASSESSMENT — PAIN - FUNCTIONAL ASSESSMENT: PAIN_FUNCTIONAL_ASSESSMENT: 0-10

## 2025-05-22 NOTE — ED NOTES
Dr. Marcelino at bedside for extended time. Plan for discharge home. Instructions reviewed with patient. Instructed to f/u with PCP. Encouraged to return with any new or worsening symptoms. Ambulatory out of dept with independent, steady gait.

## 2025-05-22 NOTE — ED TRIAGE NOTES
Mode of arrival (squad #, walk in, police, etc) : walk in        Chief complaint(s): R shoulder pain        Arrival Note (brief scenario, treatment PTA, etc).: States recent visit to chiropractor on Tues, increased R shoulder pain and now bruising. Rates pain 8/10. Denies any injury/trauma to R shoulder. Rates pain at 8/10, described as \"aching.\" No meds for pain PTA. Currently prescribed Plavix and Coumadin. Brachial and radial pulses intact.         C= \"Have you ever felt that you should Cut down on your drinking?\"  No  A= \"Have people Annoyed you by criticizing your drinking?\"  No  G= \"Have you ever felt bad or Guilty about your drinking?\"  No  E= \"Have you ever had a drink as an Eye-opener first thing in the morning to steady your nerves or to help a hangover?\"  No      Deferred []      Reason for deferring: N/A    *If yes to two or more: probable alcohol abuse.*

## 2025-05-22 NOTE — ED PROVIDER NOTES
eMERGENCY dEPARTMENT eNCOUnter        CHIEF COMPLAINT    Chief Complaint   Patient presents with    Shoulder Pain     R       hospitals    Logan Ventura is a 74 y.o. male who presents to ED with right shoulder hematoma.  Patient states that he went to his chiropractor 2 days ago and had right shoulder manipulated.  Yesterday patient noticed bruise on the right shoulder.  Patient is on Coumadin for history of atrial fibrillation.  Patient has history of CHF.  REVIEW OF SYSTEMS    All systems reviewed and positives are in the HPI      PAST MEDICAL HISTORY    Past Medical History:   Diagnosis Date    Arthritis     LEFT ANKLE    Atrial fibrillation (HCC)     Carotid stenosis 05/02/2017    CHF (congestive heart failure) ([facility])     Coronary artery disease due to lipid rich plaque 08/10/2015    Essential hypertension 08/10/2015    Hyperlipidemia 08/10/2015       SURGICAL HISTORY    Past Surgical History:   Procedure Laterality Date    CARDIAC CATHETERIZATION Left 05/06/2015    Dr. Steiner-[facility]-Severe CAD, Diffuse 90% disease in the proximal left anterior descending coronary artery.  80% disease in the distal circumflex.  80% disease in small nondominant right coronary artery.  Borderline normal LV function, ejection fraction of 50%    CARDIAC CATHETERIZATION Left 01/24/2018    Dr. Steiner @ [facility] --CAD, Small nondominant right coronary artery with 90% disease.  severe 95% disease in the native left anterior descending.  50% disease at the ostium of a very large circumflex with a fractional flow reserve of 0.98.  Occluded patent ductus arteriosus, which arises from the circumflex.  Occluded saphenous vein graft to the patent ductus arteriosus.      CARDIAC CATHETERIZATION  01/03/2025    CAROTID ENDARTERECTOMY Right     CATARACT EXTRACTION, BILATERAL Bilateral     fall 2023    CORONARY ARTERY BYPASS GRAFT  06/10/2015    Dr Andrade x2    EYE SURGERY  2023    cataract    KNEE SURGERY Left 10/14/2015    Lt knee bursa repair  shoulder hematoma.  Patient has good circulation no signs of compartment syndrome.  Patient will be discharged home.  The warning signs were discussed.  For any worsening symptoms return to ED.  Patient is on Coumadin.  Patient has a follow-up with Coumadin clinic for INR checks every week.  Last recorded INR is 1.4.        ED Medications administered this visit:  Medications - No data to display    New Prescriptions from this visit:    New Prescriptions    No medications on file       Follow-up:  [facility]  Emergency Department  [address] [id number] [phone]    As needed, If symptoms worsen        Final Impression:   1. Hematoma of right shoulder               (Please note that portions of this note were completed with a voice recognition program.  Efforts were made to edit the dictations but occasionally words are mis-transcribed.)          Jodi Marcelino MD  05/22/25 0829

## 2025-05-28 ENCOUNTER — ANTI-COAG VISIT (OUTPATIENT)
Age: 75
End: 2025-05-28
Payer: MEDICARE

## 2025-05-28 VITALS
DIASTOLIC BLOOD PRESSURE: 57 MMHG | WEIGHT: 186 LBS | HEART RATE: 45 BPM | SYSTOLIC BLOOD PRESSURE: 83 MMHG | BODY MASS INDEX: 23.25 KG/M2

## 2025-05-28 LAB
INTERNATIONAL NORMALIZATION RATIO, POC: 2.4
PROTHROMBIN TIME, POC: 0

## 2025-05-28 PROCEDURE — 85610 PROTHROMBIN TIME: CPT | Performed by: PHARMACIST

## 2025-05-28 PROCEDURE — 99211 OFF/OP EST MAY X REQ PHY/QHP: CPT | Performed by: PHARMACIST

## 2025-05-28 RX ORDER — PRASTERONE (DHEA) 50 MG
1 CAPSULE ORAL
COMMUNITY

## 2025-05-28 RX ORDER — VIT C/B6/B5/MAGNESIUM/HERB 173 50-5-6-5MG
500 CAPSULE ORAL DAILY
COMMUNITY

## 2025-05-28 NOTE — PROGRESS NOTES
[facility]  [facility]ication [facility]  ANTICOAGULATION    Referring Provider: Dr Agustin Steiner     GOAL INR: 2.0-3.0     TODAY'S INR: 2.4     WARFARIN Dosage: Continue 3 mg MWFSa, 1.5 mg all other days    INR (no units)   Date Value   2025 1.40   04/15/2025 1.80   2025 2.20   2025 3.10   04/10/2025 2.60   2025 2.40   2025 1.90     INR,(POC) (no units)   Date Value   2025 2.4   2025 1.9   2025 1.8   2025 1.9   2025 1.4   2025 1.5       Hemoglobin   Date Value Ref Range Status   2025 12.4 (L) 13.5 - 17.5 g/dL Final     Hematocrit   Date Value Ref Range Status   2025 38.5 (L) 41.0 - 53.0 % Final     ALT   Date Value Ref Range Status   2025 21 5 - 41 U/L Final     AST   Date Value Ref Range Status   2025 23 <40 U/L Final       Medication changes:  Started garlic, pamela and tumeric  Restarted Vitamin C/D/E, multivitamin    Notes:    Fingerstick INR drawn per clinic protocol. Patient states no visible blood in urine, black tarry stool or falls. He did have an ER visit on  for a very swollen and bruised right shoulder that resulted from a chiropractor visit. He was treated and released and did not have any other ER visits. He denies any missed or extra doses of warfarin. Advises he started taking garlic, pamela and tumeric supplements and he restarted Vitamin C/D/E and a multivitamin. No other changes in medication or diet. INR is therapeutic so will continue 3 mg MWFSa, 1.5 mg all other days and will recheck INR in 3 weeks. Patient acknowledges working in consult agreement with pharmacist as referred by his/her physician.    For Pharmacy Admin Tracking Only    Intervention Detail: Adherence Monitorin  Total # of Interventions Recommended: 1  Total # of Interventions Accepted: 1  Time Spent (min): 20    Jacob Peñaloza, PharmD 2025 10:35 AM

## 2025-06-05 LAB
CONTROL: NORMAL
FECAL BLOOD IMMUNOCHEMICAL TEST: NEGATIVE

## 2025-06-11 ENCOUNTER — OFFICE VISIT (OUTPATIENT)
Dept: CARDIOLOGY CLINIC | Age: 75
End: 2025-06-11
Payer: MEDICARE

## 2025-06-11 ENCOUNTER — HOSPITAL ENCOUNTER (OUTPATIENT)
Age: 75
Discharge: HOME | End: 2025-06-11
Payer: MEDICARE

## 2025-06-11 VITALS
BODY MASS INDEX: 23 KG/M2 | WEIGHT: 184 LBS | DIASTOLIC BLOOD PRESSURE: 60 MMHG | OXYGEN SATURATION: 98 % | SYSTOLIC BLOOD PRESSURE: 94 MMHG | HEART RATE: 110 BPM

## 2025-06-11 DIAGNOSIS — I42.9 CARDIOMYOPATHY, UNSPECIFIED TYPE (HCC): ICD-10-CM

## 2025-06-11 DIAGNOSIS — R06.02 SHORTNESS OF BREATH: ICD-10-CM

## 2025-06-11 DIAGNOSIS — I25.83 CORONARY ARTERY DISEASE DUE TO LIPID RICH PLAQUE: ICD-10-CM

## 2025-06-11 DIAGNOSIS — I48.0 PAROXYSMAL ATRIAL FIBRILLATION (HCC): ICD-10-CM

## 2025-06-11 DIAGNOSIS — I25.10 CORONARY ARTERY DISEASE DUE TO LIPID RICH PLAQUE: ICD-10-CM

## 2025-06-11 DIAGNOSIS — I10 ESSENTIAL HYPERTENSION: ICD-10-CM

## 2025-06-11 DIAGNOSIS — I42.9 CARDIOMYOPATHY, UNSPECIFIED TYPE (HCC): Primary | ICD-10-CM

## 2025-06-11 DIAGNOSIS — E78.00 PURE HYPERCHOLESTEROLEMIA: ICD-10-CM

## 2025-06-11 DIAGNOSIS — R06.02 SOB (SHORTNESS OF BREATH): ICD-10-CM

## 2025-06-11 DIAGNOSIS — E55.9 VITAMIN D DEFICIENCY: ICD-10-CM

## 2025-06-11 LAB
ALBUMIN SERPL-MCNC: 4 G/DL (ref 3.5–5.2)
ALBUMIN/GLOB SERPL: 1.6 {RATIO} (ref 1–2.5)
ALP SERPL-CCNC: 77 U/L (ref 40–129)
ALT SERPL-CCNC: 19 U/L (ref 5–41)
ANION GAP SERPL CALCULATED.3IONS-SCNC: 9 MMOL/L (ref 9–17)
AST SERPL-CCNC: 23 U/L
BASOPHILS # BLD: 0.02 K/UL (ref 0–0.2)
BASOPHILS NFR BLD: 0 % (ref 0–2)
BILIRUB SERPL-MCNC: 0.8 MG/DL (ref 0.3–1.2)
BUN SERPL-MCNC: 28 MG/DL (ref 8–23)
CALCIUM SERPL-MCNC: 9.2 MG/DL (ref 8.6–10.4)
CHLORIDE SERPL-SCNC: 103 MMOL/L (ref 98–107)
CO2 SERPL-SCNC: 29 MMOL/L (ref 20–31)
CREAT SERPL-MCNC: 1.4 MG/DL (ref 0.7–1.2)
EOSINOPHIL # BLD: 0.13 K/UL (ref 0–0.4)
EOSINOPHILS RELATIVE PERCENT: 2 % (ref 0–5)
ERYTHROCYTE [DISTWIDTH] IN BLOOD BY AUTOMATED COUNT: 19.4 % (ref 12.1–15.2)
GFR, ESTIMATED: 53 ML/MIN/1.73M2
GLUCOSE SERPL-MCNC: 105 MG/DL (ref 70–99)
HCT VFR BLD AUTO: 39.6 % (ref 41–53)
HGB BLD-MCNC: 13.1 G/DL (ref 13.5–17.5)
IMM GRANULOCYTES # BLD AUTO: 0.01 K/UL (ref 0–0.3)
IMM GRANULOCYTES NFR BLD: 0 % (ref 0–5)
LYMPHOCYTES NFR BLD: 1.44 K/UL (ref 1–4.8)
LYMPHOCYTES RELATIVE PERCENT: 26 % (ref 13–44)
MCH RBC QN AUTO: 27.6 PG (ref 26–34)
MCHC RBC AUTO-ENTMCNC: 33.1 G/DL (ref 31–37)
MCV RBC AUTO: 83.5 FL (ref 80–100)
MONOCYTES NFR BLD: 0.57 K/UL (ref 0–1)
MONOCYTES NFR BLD: 10 % (ref 5–9)
MORPHOLOGY: ABNORMAL
NEUTROPHILS NFR BLD: 61 % (ref 39–75)
NEUTS SEG NFR BLD: 3.41 K/UL (ref 2.1–6.5)
PLATELET # BLD AUTO: 173 K/UL (ref 140–450)
PMV BLD AUTO: 9.6 FL (ref 6–12)
POTASSIUM SERPL-SCNC: 4 MMOL/L (ref 3.7–5.3)
PROT SERPL-MCNC: 6.5 G/DL (ref 6.4–8.3)
RBC # BLD AUTO: 4.74 M/UL (ref 4.5–5.9)
SODIUM SERPL-SCNC: 141 MMOL/L (ref 135–144)
WBC OTHER # BLD: 5.6 K/UL (ref 3.5–11)

## 2025-06-11 PROCEDURE — 36415 COLL VENOUS BLD VENIPUNCTURE: CPT

## 2025-06-11 PROCEDURE — 99214 OFFICE O/P EST MOD 30 MIN: CPT | Performed by: INTERNAL MEDICINE

## 2025-06-11 PROCEDURE — 1123F ACP DISCUSS/DSCN MKR DOCD: CPT | Performed by: INTERNAL MEDICINE

## 2025-06-11 PROCEDURE — 85025 COMPLETE CBC W/AUTO DIFF WBC: CPT

## 2025-06-11 PROCEDURE — 3074F SYST BP LT 130 MM HG: CPT | Performed by: INTERNAL MEDICINE

## 2025-06-11 PROCEDURE — 80053 COMPREHEN METABOLIC PANEL: CPT

## 2025-06-11 PROCEDURE — 1159F MED LIST DOCD IN RCRD: CPT | Performed by: INTERNAL MEDICINE

## 2025-06-11 PROCEDURE — 3078F DIAST BP <80 MM HG: CPT | Performed by: INTERNAL MEDICINE

## 2025-06-11 NOTE — PROGRESS NOTES
Ov DR Steiner 3 week follow up  Kendrick Padron here with pt.  Went to [facility]  Discussed valve clip   Pt unsure about that   Has another appt in July.    Pt feeling better.   No chest pain   Sob improved.  No edema  No dizziness    Bedside echo done    See in August

## 2025-06-18 ENCOUNTER — ANTI-COAG VISIT (OUTPATIENT)
Age: 75
End: 2025-06-18
Payer: MEDICARE

## 2025-06-18 ENCOUNTER — TELEPHONE (OUTPATIENT)
Dept: FAMILY MEDICINE CLINIC | Age: 75
End: 2025-06-18

## 2025-06-18 VITALS — BODY MASS INDEX: 23.72 KG/M2 | WEIGHT: 189.8 LBS

## 2025-06-18 LAB
INTERNATIONAL NORMALIZATION RATIO, POC: 1.2
PROTHROMBIN TIME, POC: 0

## 2025-06-18 PROCEDURE — 85610 PROTHROMBIN TIME: CPT | Performed by: PHARMACIST

## 2025-06-18 PROCEDURE — 99211 OFF/OP EST MAY X REQ PHY/QHP: CPT | Performed by: PHARMACIST

## 2025-06-18 NOTE — PROGRESS NOTES
[facility]  Medication [facility]  ANTICOAGULATION    Referring Provider: Dr Agustin Steiner     GOAL INR: 2.0-3.0     TODAY'S INR: 1.2     WARFARIN Dosage: 6 mg x 2    INR (no units)   Date Value   2025 1.40   04/15/2025 1.80   2025 2.20   2025 3.10   04/10/2025 2.60   2025 2.40   2025 1.90     INR,(POC) (no units)   Date Value   2025 1.2   2025 2.4   2025 1.9   2025 1.8   2025 1.9   2025 1.4   2025 1.5       Hemoglobin   Date Value Ref Range Status   2025 13.1 (L) 13.5 - 17.5 g/dL Final     Hematocrit   Date Value Ref Range Status   2025 39.6 (L) 41.0 - 53.0 % Final     ALT   Date Value Ref Range Status   2025 19 5 - 41 U/L Final     AST   Date Value Ref Range Status   2025 23 <40 U/L Final       Medication changes:  None     Notes:    Fingerstick INR drawn per clinic protocol. Patient states no visible blood in urine, black tarry stool, falls or ER visits. He does have a significant bruise on his right upper arm from where the chiropractor worked on him. It is healing though. He claims he is still taking the warfarin as prescribed and denies any missed or extra doses of warfarin. He says had more of an appetite lately and is eating everything, including lots of vegetables. No other changes in medication or diet. INR is extremely subtherapeutic so will order 6 mg for 2 days and will recheck INR in 2 days. He will also avoid vegetables for these 2 days. Patient acknowledges working in consult agreement with pharmacist as referred by his/her physician.    For Pharmacy Admin Tracking Only    Intervention Detail: Adherence Monitorin and Dose Adjustment: 1, reason: Therapy Optimization  Total # of Interventions Recommended: 2  Total # of Interventions Accepted: 2  Time Spent (min): 20    Jacob Peñaloza, PharmD 2025 10:47 AM

## 2025-06-19 NOTE — PROGRESS NOTES
Agustin Steiner MD  [facility]  [facility]  [address] [place] [address]  [phone]        2025        Alondra Cavazos MD  [address] [place] [address]     RE:  LENNIE MCFARLAND  :  1950    Dear Dr. Cavazos:    CHIEF COMPLAINT:    Shortness of breath.  Severe cardiomyopathy.    HISTORY OF PRESENT ILLNESS:  I saw Lennie Mcfarland and his son, Vito.  As you know, he has a severe cardiomyopathy with an EF in the 25% to 30% range.    He is being evaluated at [facility] for possible mitral valve and tricuspid valve clip.  He has an appointment in July with a right heart catheterization for further evaluation.    He overall is doing well.  He denies any chest pain or chest discomfort.  No unusual shortness of breath, and that is improving.  He denies any PND or orthopnea.  He has no pedal edema.  He denies any dizziness.  He has no palpitations.  He is trying to do more exercising.    MEDICATIONS:  Lipitor 40 mg daily, Plavix 75 mg daily, Jardiance 10 mg daily, Toprol-XL 25 mg daily, Micro-K 10 mEq b.i.d., Aldactone 25 mg daily, Demadex 20 mg daily, and Coumadin as directed.    PHYSICAL EXAMINATION:  VITAL SIGNS:  Blood pressure was 94/60 with a heart rate of 100 and irregular, respiratory rate 18, O2 saturation 94%, and weight 184 pounds.  GENERAL:  He is a very pleasant 74-year-old gentleman. Denied pain.  He was oriented to person, place, and time.  Answered appropriately.  SKIN:  No unusual skin changes.  HEENT:  Pupils are equally round and intact.  Mucous membranes are dry.  NECK:  No JVD.  Good carotid pulses.  No bruits.    CARDIOVASCULAR:  S1 and S2 normal.  No S3 or S4.  Soft systolic murmur.  No diastolic murmur.  PMI was normal.  No lift, thrust, or pericardial friction rub.  LUNGS:  Clear to auscultation and percussion.  ABDOMEN:  Soft.  Good bowel sounds.  EXTREMITIES:  Good pedal pulses.  No pedal edema.  Skin is warm and

## 2025-06-20 ENCOUNTER — ANTI-COAG VISIT (OUTPATIENT)
Age: 75
End: 2025-06-20
Payer: MEDICARE

## 2025-06-20 LAB
INTERNATIONAL NORMALIZATION RATIO, POC: 1.2
PROTHROMBIN TIME, POC: 0

## 2025-06-20 PROCEDURE — 85610 PROTHROMBIN TIME: CPT | Performed by: PHARMACIST

## 2025-06-20 PROCEDURE — 99211 OFF/OP EST MAY X REQ PHY/QHP: CPT | Performed by: PHARMACIST

## 2025-06-20 NOTE — PROGRESS NOTES
[facility]  Medication Management  ANTICOAGULATION    Referring Provider: Dr Agustin Steiner     GOAL INR: 2.0-3.0     TODAY'S INR: 1.2     WARFARIN Dosage: 6 mg x 3    INR (no units)   Date Value   04/16/2025 1.40   04/15/2025 1.80   04/14/2025 2.20   04/11/2025 3.10   04/10/2025 2.60   04/09/2025 2.40   04/08/2025 1.90     INR,(POC) (no units)   Date Value   06/20/2025 1.2   06/18/2025 1.2   05/28/2025 2.4   05/14/2025 1.9   04/30/2025 1.8   04/23/2025 1.9   04/22/2025 1.4       Hemoglobin   Date Value Ref Range Status   06/11/2025 13.1 (L) 13.5 - 17.5 g/dL Final     Hematocrit   Date Value Ref Range Status   06/11/2025 39.6 (L) 41.0 - 53.0 % Final     ALT   Date Value Ref Range Status   06/11/2025 19 5 - 41 U/L Final     AST   Date Value Ref Range Status   06/11/2025 23 <40 U/L Final       Medication changes:  None     Notes:    Fingerstick INR drawn per clinic protocol. Patient states no visible blood in urine, black tarry stool, falls or ER visits and denies any missed or extra doses of warfarin. He also avoided greens as requested. No other changes in medication or diet. His result is surprising in that he admits to taking 2 tablets daily and is still avoiding greens. Questions were asked about his warfarin tablets and he admitted to combining an old prescription with a new one. States one round and 1 is oblong. He did however confirm that the 2 different tablets are NOT the same color which will lead to an assumption that they are not the same strength. He is asked to separate those medications and only take the round tablets this time as he believes the round one looks like the proper color when shown a picture. INR is still rock bottom so will order 6 mg daily for 3 days and will recheck INR in 3 days. Patient acknowledges working in consult agreement with pharmacist as referred by his/her physician.    For Pharmacy Admin Tracking Only    Intervention Detail: Adherence

## 2025-06-23 ENCOUNTER — ANTI-COAG VISIT (OUTPATIENT)
Age: 75
End: 2025-06-23
Payer: MEDICARE

## 2025-06-23 VITALS — BODY MASS INDEX: 23.85 KG/M2 | WEIGHT: 190.8 LBS

## 2025-06-23 LAB
INTERNATIONAL NORMALIZATION RATIO, POC: 2.8
PROTHROMBIN TIME, POC: 0

## 2025-06-23 PROCEDURE — 99211 OFF/OP EST MAY X REQ PHY/QHP: CPT | Performed by: PHARMACIST

## 2025-06-23 PROCEDURE — 85610 PROTHROMBIN TIME: CPT | Performed by: PHARMACIST

## 2025-06-23 NOTE — PROGRESS NOTES
working in consult agreement with pharmacist as referred by his/her physician.    For Pharmacy Admin Tracking Only    Intervention Detail: Adherence Monitorin and Dose Adjustment: 1, reason: Therapy Optimization  Total # of Interventions Recommended: 2  Total # of Interventions Accepted: 2  Time Spent (min): 20    Jacob Peñaloza, PharmD 2025 10:55 AM

## 2025-07-01 ENCOUNTER — TELEPHONE (OUTPATIENT)
Dept: PHARMACY | Facility: CLINIC | Age: 75
End: 2025-07-01

## 2025-07-01 ENCOUNTER — TELEPHONE (OUTPATIENT)
Dept: FAMILY MEDICINE CLINIC | Age: 75
End: 2025-07-01

## 2025-07-01 NOTE — TELEPHONE ENCOUNTER
Our records indicate that Logan JACKSON Lorenzo may benefit from medication optimization to meet the three pillars of CHF care (ACE/ARB/ARNI, Beta Blocker, and MRA therapy). The ask is to change the patient's medications below, as appropriate. If the patient is being followed by a cardiologist, please ensure they have a follow up appointment with that provider to discuss further.     The measure definition, denominator, inclusions, and exclusions are below:

## 2025-07-01 NOTE — TELEPHONE ENCOUNTER
[facility] CLINICAL PHARMACY: HEART FAILURE TREATMENT REVIEW  As part of [facility]'s efforts to reduce heart failure (HF) treatment gaps across the healthcare continuum, the organization is focused on improving alignment with guideline-directed medical therapy (GDMT) for heart failure with reduced ejection fraction (HFrEF).    TRIPLE GDMT GAP IDENTIFIED  Logan Ventura is an adult with a diagnosis of HF with a current or previous left ventricular ejection fraction (LVEF) of <=40% who is NOT prescribed and ACE/ARB/ARNI as reflected in the Epic ambulatory medication list.     Allergies   Allergen Reactions    Lisinopril      Swollen tongue      has a past medical history of Arthritis, Atrial fibrillation (HCC), Carotid stenosis, CHF (congestive heart failure) (HCC), Coronary artery disease due to lipid rich plaque, Essential hypertension, and Hyperlipidemia.    has a current medication list which includes the following prescription(s): turmeric, pamela, garlic, vitamin c, vitamin e, multivitamin, nutritional supplements, warfarin, torsemide, metoprolol succinate, empagliflozin, vitamin d3, potassium chloride, clopidogrel, spironolactone, and atorvastatin.    BP Readings from Last 3 Encounters:   06/11/25 94/60   05/28/25 (!) 83/57   05/22/25 119/61     Pulse Readings from Last 3 Encounters:   06/11/25 (!) 110   05/28/25 (!) 45   05/22/25 59     Lab Results   Component Value Date/Time    LABGLOM 53 06/11/2025 08:06 AM    LABGLOM 53 05/21/2025 10:37 AM    LABGLOM 58 05/14/2025 10:30 AM    LABGLOM >60 11/06/2023 08:56 AM    LABGLOM >60 01/24/2023 11:23 AM    LABGLOM 58 10/31/2022 09:08 AM    CREATININE 1.4 06/11/2025 08:06 AM    CREATININE 1.4 05/21/2025 10:37 AM    CREATININE 1.3 05/14/2025 10:30 AM   Estimated Creatinine Clearance: 55 mL/min (A) (based on SCr of 1.4 mg/dL (H)).    Lab Results   Component Value Date/Time    ALT 19 06/11/2025 08:06 AM    ALT 21 04/30/2025 11:39 AM    ALT 19 04/04/2025 06:55 AM

## 2025-07-02 ENCOUNTER — ANTI-COAG VISIT (OUTPATIENT)
Age: 75
End: 2025-07-02
Payer: MEDICARE

## 2025-07-02 VITALS
SYSTOLIC BLOOD PRESSURE: 102 MMHG | WEIGHT: 186.4 LBS | HEART RATE: 37 BPM | BODY MASS INDEX: 23.3 KG/M2 | DIASTOLIC BLOOD PRESSURE: 69 MMHG

## 2025-07-02 DIAGNOSIS — I48.21 PERMANENT ATRIAL FIBRILLATION (HCC): Primary | ICD-10-CM

## 2025-07-02 LAB
INTERNATIONAL NORMALIZATION RATIO, POC: 2.8
PROTHROMBIN TIME, POC: 0

## 2025-07-02 PROCEDURE — 99211 OFF/OP EST MAY X REQ PHY/QHP: CPT | Performed by: PHARMACIST

## 2025-07-02 PROCEDURE — 85610 PROTHROMBIN TIME: CPT | Performed by: PHARMACIST

## 2025-07-02 NOTE — PROGRESS NOTES
[facility]  [facility]  ANTICOAGULATION    Referring Provider: Dr Agustin Steiner     GOAL INR: 2.0-3.0     TODAY'S INR: 2.8     WARFARIN Dosage: Continue 1.5 mg SuTT, 3 mg all other days of the week    INR (no units)   Date Value   2025 1.40   04/15/2025 1.80   2025 2.20   2025 3.10   04/10/2025 2.60   2025 2.40   2025 1.90     INR,(POC) (no units)   Date Value   2025 2.8   2025 2.8   2025 1.2   2025 1.2   2025 2.4   2025 1.9   2025 1.8       Hemoglobin   Date Value Ref Range Status   2025 13.1 (L) 13.5 - 17.5 g/dL Final     Hematocrit   Date Value Ref Range Status   2025 39.6 (L) 41.0 - 53.0 % Final     ALT   Date Value Ref Range Status   2025 19 5 - 41 U/L Final     AST   Date Value Ref Range Status   2025 23 <40 U/L Final       Medication changes:  None     Notes:    Fingerstick INR drawn per clinic protocol. Patient states no visible blood in urine, black tarry stool, falls or ER visits and denies any missed or extra doses of warfarin. Still avoiding green tea and has had no other changes in medication or diet. INR is therapeutic so will continue 1.5 mg SuTT, 3 mg all other days and will recheck INR in 2 weeks. Patient acknowledges working in consult agreement with pharmacist as referred by his/her physician.    For Pharmacy Admin Tracking Only    Intervention Detail: Adherence Monitorin  Total # of Interventions Recommended: 1  Total # of Interventions Accepted: 1  Time Spent (min): 20    Jacob Peñaloza, PharmD 2025 10:10 AM

## 2025-07-10 ENCOUNTER — TELEPHONE (OUTPATIENT)
Dept: CARDIOLOGY CLINIC | Age: 75
End: 2025-07-10

## 2025-07-10 RX ORDER — CLOPIDOGREL BISULFATE 75 MG/1
75 TABLET ORAL DAILY
Qty: 30 TABLET | Refills: 11 | Status: SHIPPED | OUTPATIENT
Start: 2025-07-10 | End: 2025-07-10 | Stop reason: SDUPTHER

## 2025-07-10 RX ORDER — POTASSIUM CHLORIDE 750 MG/1
10 CAPSULE, EXTENDED RELEASE ORAL 2 TIMES DAILY
Qty: 60 CAPSULE | Refills: 11 | Status: SHIPPED | OUTPATIENT
Start: 2025-07-10 | End: 2025-07-10 | Stop reason: SDUPTHER

## 2025-07-10 RX ORDER — ACETAMINOPHEN 160 MG
2000 TABLET,DISINTEGRATING ORAL DAILY
Qty: 90 CAPSULE | Refills: 3 | Status: SHIPPED | OUTPATIENT
Start: 2025-07-10

## 2025-07-10 RX ORDER — METOPROLOL SUCCINATE 25 MG/1
25 TABLET, EXTENDED RELEASE ORAL DAILY
Qty: 90 TABLET | Refills: 3 | Status: SHIPPED | OUTPATIENT
Start: 2025-07-10

## 2025-07-10 RX ORDER — SPIRONOLACTONE 25 MG/1
25 TABLET ORAL DAILY
Qty: 90 TABLET | Refills: 3 | Status: SHIPPED | OUTPATIENT
Start: 2025-07-10

## 2025-07-10 RX ORDER — TORSEMIDE 20 MG/1
20 TABLET ORAL DAILY
Qty: 90 TABLET | Refills: 3 | Status: SHIPPED | OUTPATIENT
Start: 2025-07-10

## 2025-07-10 RX ORDER — ATORVASTATIN CALCIUM 40 MG/1
40 TABLET, FILM COATED ORAL DAILY
Qty: 90 TABLET | Refills: 3 | Status: SHIPPED | OUTPATIENT
Start: 2025-07-10 | End: 2025-07-10 | Stop reason: SDUPTHER

## 2025-07-10 RX ORDER — CLOPIDOGREL BISULFATE 75 MG/1
75 TABLET ORAL DAILY
Qty: 90 TABLET | Refills: 3 | Status: SHIPPED | OUTPATIENT
Start: 2025-07-10

## 2025-07-10 RX ORDER — METOPROLOL SUCCINATE 25 MG/1
25 TABLET, EXTENDED RELEASE ORAL DAILY
Qty: 30 TABLET | Refills: 11 | Status: SHIPPED | OUTPATIENT
Start: 2025-07-10 | End: 2025-07-10 | Stop reason: SDUPTHER

## 2025-07-10 RX ORDER — MULTIVIT WITH MINERALS/LUTEIN
400 TABLET ORAL DAILY
Qty: 90 CAPSULE | Refills: 3 | Status: SHIPPED | OUTPATIENT
Start: 2025-07-10

## 2025-07-10 RX ORDER — POTASSIUM CHLORIDE 750 MG/1
10 CAPSULE, EXTENDED RELEASE ORAL 2 TIMES DAILY
Qty: 180 CAPSULE | Refills: 3 | Status: SHIPPED | OUTPATIENT
Start: 2025-07-10

## 2025-07-10 RX ORDER — MULTIVIT WITH MINERALS/LUTEIN
400 TABLET ORAL DAILY
Qty: 30 CAPSULE | Refills: 11 | Status: SHIPPED | OUTPATIENT
Start: 2025-07-10 | End: 2025-07-10 | Stop reason: SDUPTHER

## 2025-07-10 RX ORDER — ATORVASTATIN CALCIUM 40 MG/1
40 TABLET, FILM COATED ORAL DAILY
Qty: 90 TABLET | Refills: 3 | Status: SHIPPED | OUTPATIENT
Start: 2025-07-10

## 2025-07-10 RX ORDER — ACETAMINOPHEN 160 MG
2000 TABLET,DISINTEGRATING ORAL DAILY
Qty: 30 CAPSULE | Refills: 11 | Status: SHIPPED | OUTPATIENT
Start: 2025-07-10 | End: 2025-07-10 | Stop reason: SDUPTHER

## 2025-07-10 RX ORDER — TORSEMIDE 20 MG/1
20 TABLET ORAL DAILY
Qty: 30 TABLET | Refills: 11 | Status: SHIPPED | OUTPATIENT
Start: 2025-07-10 | End: 2025-07-10 | Stop reason: SDUPTHER

## 2025-07-10 NOTE — TELEPHONE ENCOUNTER
PT contacted office for refills. States that he has a bottle for furosemide but it is not on his medication list. Pt wants to know If he is to be taking medication, Please advise.

## 2025-07-16 ENCOUNTER — ANTI-COAG VISIT (OUTPATIENT)
Age: 75
End: 2025-07-16
Payer: MEDICARE

## 2025-07-16 VITALS
HEART RATE: 62 BPM | BODY MASS INDEX: 23 KG/M2 | WEIGHT: 184 LBS | DIASTOLIC BLOOD PRESSURE: 54 MMHG | SYSTOLIC BLOOD PRESSURE: 85 MMHG

## 2025-07-16 LAB
INTERNATIONAL NORMALIZATION RATIO, POC: 1.4
PROTHROMBIN TIME, POC: 0

## 2025-07-16 PROCEDURE — 99211 OFF/OP EST MAY X REQ PHY/QHP: CPT | Performed by: PHARMACIST

## 2025-07-16 PROCEDURE — 85610 PROTHROMBIN TIME: CPT | Performed by: PHARMACIST

## 2025-07-16 RX ORDER — ZINC GLUCONATE 50 MG
25 TABLET ORAL DAILY
COMMUNITY

## 2025-07-16 NOTE — PROGRESS NOTES
[facility]  [facility]ication [facility]  ANTICOAGULATION    Referring Provider: Dr Agustin Steiner     GOAL INR: 2.0-3.0     TODAY'S INR: 1.4     WARFARIN Dosage: 6 mg x 1, 3 mg x 1, then increase dose to 1.5 mg Su/Th, 3 mg all other days of the week    INR (no units)   Date Value   04/16/2025 1.40   04/15/2025 1.80   04/14/2025 2.20   04/11/2025 3.10   04/10/2025 2.60   04/09/2025 2.40   04/08/2025 1.90     INR,(POC) (no units)   Date Value   07/16/2025 1.4   07/02/2025 2.8   06/23/2025 2.8   06/20/2025 1.2   06/18/2025 1.2   05/28/2025 2.4   05/14/2025 1.9       Hemoglobin   Date Value Ref Range Status   06/11/2025 13.1 (L) 13.5 - 17.5 g/dL Final     Hematocrit   Date Value Ref Range Status   06/11/2025 39.6 (L) 41.0 - 53.0 % Final     ALT   Date Value Ref Range Status   06/11/2025 19 5 - 41 U/L Final     AST   Date Value Ref Range Status   06/11/2025 23 <40 U/L Final       Medication changes:  Started zinc gluconate 25 mg QD    Notes:    Fingerstick INR drawn per clinic protocol. Patient states no visible blood in urine, black tarry stool, falls or ER visits. Mentions that he accidentally took today's warfarin dose yesterday in addition to yesterday's dose. Took his Tuesday dose in the morning and then took Wednesday's morning meds a couple hours later on accident. He denies any other missed or extra doses of warfarin. He started taking zinc gluconate 25 mg daily but has had no other changes in medication or diet. INR is substantially subtherapeutic today. He has avoided green tea but is possibly eating more vegetables. He does mention that he had his medications switched back to DDM-Juda and mentions that he got the other shaped/colored warfarin again. See visit notes from 6/20/25. Will have him take 6 mg this morning, 3 mg tomorrow morning and will increase his dose to 1.5 mg Su/Th, 3 mg all other days and will recheck INR in 1 week. Patient acknowledges working in consult agreement

## 2025-07-23 ENCOUNTER — ANTI-COAG VISIT (OUTPATIENT)
Age: 75
End: 2025-07-23
Payer: MEDICARE

## 2025-07-23 DIAGNOSIS — I48.21 PERMANENT ATRIAL FIBRILLATION (HCC): Primary | ICD-10-CM

## 2025-07-23 LAB
INTERNATIONAL NORMALIZATION RATIO, POC: 2.6
PROTHROMBIN TIME, POC: 0

## 2025-07-23 PROCEDURE — 85610 PROTHROMBIN TIME: CPT

## 2025-07-23 PROCEDURE — 99211 OFF/OP EST MAY X REQ PHY/QHP: CPT

## 2025-07-23 RX ORDER — MULTIVIT WITH MINERALS/LUTEIN
250 TABLET ORAL DAILY
COMMUNITY

## 2025-07-23 RX ORDER — VITAMIN E 268 MG
400 CAPSULE ORAL DAILY
COMMUNITY

## 2025-07-23 NOTE — PROGRESS NOTES
[facility]  Medication Management  ANTICOAGULATION    Referring Provider: Dr Agustin Steiner     GOAL INR: 2.0 - 3.0     TODAY'S INR: 2.6     WARFARIN Dosage: 1.5 mg Su/Th, 3 mg all other days of the week      INR (no units)   Date Value   04/16/2025 1.40   04/15/2025 1.80   04/14/2025 2.20   04/11/2025 3.10   04/10/2025 2.60   04/09/2025 2.40   04/08/2025 1.90     INR,(POC) (no units)   Date Value   07/16/2025 1.4   07/02/2025 2.8   06/23/2025 2.8   06/20/2025 1.2   06/18/2025 1.2   05/28/2025 2.4   05/14/2025 1.9       Hemoglobin   Date Value Ref Range Status   06/11/2025 13.1 (L) 13.5 - 17.5 g/dL Final     Hematocrit   Date Value Ref Range Status   06/11/2025 39.6 (L) 41.0 - 53.0 % Final     ALT   Date Value Ref Range Status   06/11/2025 19 5 - 41 U/L Final     AST   Date Value Ref Range Status   06/11/2025 23 <40 U/L Final       Medication changes:  None      Notes:    Fingerstick INR drawn per clinic protocol. Patient states no visible blood in urine and no black tarry stool. Denies any missed doses of warfarin, although he says that he took \"2 tablets\" (total of 6 mg warfarin) this morning because \"that's what (he) did last Wednesday\" when his INR was 1.4. I have reminded him that he should not take his warfarin dose in the AM on the day of his INR check until after we have received his INR result. Henri brings all his medication bottles with him today so we have reviewed these in detail and updated his medication profile. He admits that he is not consistent with his pamela and tumeric supplements and is also very inconsistent with his Ensure supplement. He says that \"some days\" he \"might drink 2 or 3\" and \"some days\" he \"might drink 1\" or \"not at all\". I have reminded him that inconsistency with his Ensure supplementation will cause undesirable fluctuations in his INR's and could have been the reason for his INR of 1.4 last week on 7/16/2025. Henri says that he will try to drink \"just

## 2025-07-30 ENCOUNTER — ANTI-COAG VISIT (OUTPATIENT)
Age: 75
End: 2025-07-30
Payer: MEDICARE

## 2025-07-30 VITALS — HEART RATE: 84 BPM | SYSTOLIC BLOOD PRESSURE: 110 MMHG | DIASTOLIC BLOOD PRESSURE: 68 MMHG

## 2025-07-30 DIAGNOSIS — I48.91 ATRIAL FIBRILLATION, UNSPECIFIED TYPE (HCC): Primary | ICD-10-CM

## 2025-07-30 LAB
INTERNATIONAL NORMALIZATION RATIO, POC: 1.3
PROTHROMBIN TIME, POC: 0

## 2025-07-30 PROCEDURE — 85610 PROTHROMBIN TIME: CPT | Performed by: PHARMACIST

## 2025-07-30 PROCEDURE — 99211 OFF/OP EST MAY X REQ PHY/QHP: CPT | Performed by: PHARMACIST

## 2025-07-30 RX ORDER — DIGOXIN 125 MCG
125 TABLET ORAL DAILY
COMMUNITY

## 2025-07-30 RX ORDER — EPLERENONE 50 MG/1
50 TABLET ORAL DAILY
COMMUNITY

## 2025-07-30 NOTE — PROGRESS NOTES
[facility]  [facility]  ANTICOAGULATION    Referring Provider: Dr Agustin Steiner     GOAL INR: 2.0-3.0     TODAY'S INR: 1.3     WARFARIN Dosage: 6 mg x 2, then resume 1.5 mg Su/Th, 3 mg all other days of the week    INR (no units)   Date Value   04/16/2025 1.40   04/15/2025 1.80   04/14/2025 2.20   04/11/2025 3.10   04/10/2025 2.60   04/09/2025 2.40   04/08/2025 1.90     INR,(POC) (no units)   Date Value   07/30/2025 1.3   07/23/2025 2.6   07/16/2025 1.4   07/02/2025 2.8   06/23/2025 2.8   06/20/2025 1.2   06/18/2025 1.2       Hemoglobin   Date Value Ref Range Status   06/11/2025 13.1 (L) 13.5 - 17.5 g/dL Final     Hematocrit   Date Value Ref Range Status   06/11/2025 39.6 (L) 41.0 - 53.0 % Final     ALT   Date Value Ref Range Status   06/11/2025 19 5 - 41 U/L Final     AST   Date Value Ref Range Status   06/11/2025 23 <40 U/L Final       Medication changes:  Stopped atorvastatin, metoprolol, potassium and spironolactone  Started on digoxin 125 mcg daily, eplerenone 50 mg daily  Restarted red yeast rice and Co-Q 10    Notes:    Fingerstick INR drawn per clinic protocol. Patient states no visible blood in urine, black tarry stool, falls or ER visits. Logan had a cath procedure through his jugular on 7/23 but then skipped his warfarin 7/24 to 7/28 because the incision kept bleeding. He denies any other missed or extra doses of warfarin. His doctor stopped his atorvastatin, metoprolol, potassium and spironolactone and then started him on digoxin 125 mcg daily and eplerenone 50 mg daily. States he also restarted red yeast rice and Co-Q 10. No other changes in medication or diet. His INR is subtherapeutic which is expected since he only restarted warfarin last night so will order 6 mg for 2 doses before resuming 1.5 mg Mckinney/Th, 3 mg all other days and will recheck INR in 1 week. Patient acknowledges working in consult agreement with pharmacist as referred by his/her physician.    For

## 2025-08-06 ENCOUNTER — OFFICE VISIT (OUTPATIENT)
Dept: CARDIOLOGY CLINIC | Age: 75
End: 2025-08-06
Payer: MEDICARE

## 2025-08-06 ENCOUNTER — ANTI-COAG VISIT (OUTPATIENT)
Age: 75
End: 2025-08-06
Payer: MEDICARE

## 2025-08-06 VITALS
SYSTOLIC BLOOD PRESSURE: 98 MMHG | HEIGHT: 75 IN | BODY MASS INDEX: 23 KG/M2 | WEIGHT: 185 LBS | OXYGEN SATURATION: 90 % | DIASTOLIC BLOOD PRESSURE: 60 MMHG | HEART RATE: 69 BPM

## 2025-08-06 VITALS
BODY MASS INDEX: 23.12 KG/M2 | HEART RATE: 40 BPM | DIASTOLIC BLOOD PRESSURE: 64 MMHG | SYSTOLIC BLOOD PRESSURE: 101 MMHG | WEIGHT: 185 LBS

## 2025-08-06 DIAGNOSIS — I48.0 PAROXYSMAL ATRIAL FIBRILLATION (HCC): ICD-10-CM

## 2025-08-06 DIAGNOSIS — R06.02 SOB (SHORTNESS OF BREATH): ICD-10-CM

## 2025-08-06 DIAGNOSIS — E55.9 VITAMIN D DEFICIENCY: ICD-10-CM

## 2025-08-06 DIAGNOSIS — I25.83 CORONARY ARTERY DISEASE DUE TO LIPID RICH PLAQUE: ICD-10-CM

## 2025-08-06 DIAGNOSIS — I25.10 CORONARY ARTERY DISEASE DUE TO LIPID RICH PLAQUE: ICD-10-CM

## 2025-08-06 DIAGNOSIS — E78.00 PURE HYPERCHOLESTEROLEMIA: ICD-10-CM

## 2025-08-06 DIAGNOSIS — I42.9 CARDIOMYOPATHY, UNSPECIFIED TYPE (HCC): Primary | ICD-10-CM

## 2025-08-06 LAB
INTERNATIONAL NORMALIZATION RATIO, POC: 1.6
PROTHROMBIN TIME, POC: 0

## 2025-08-06 PROCEDURE — 99214 OFFICE O/P EST MOD 30 MIN: CPT | Performed by: NURSE PRACTITIONER

## 2025-08-06 PROCEDURE — 3078F DIAST BP <80 MM HG: CPT | Performed by: NURSE PRACTITIONER

## 2025-08-06 PROCEDURE — 85610 PROTHROMBIN TIME: CPT | Performed by: PHARMACIST

## 2025-08-06 PROCEDURE — 99211 OFF/OP EST MAY X REQ PHY/QHP: CPT | Performed by: PHARMACIST

## 2025-08-06 PROCEDURE — 1123F ACP DISCUSS/DSCN MKR DOCD: CPT | Performed by: NURSE PRACTITIONER

## 2025-08-06 PROCEDURE — 3074F SYST BP LT 130 MM HG: CPT | Performed by: NURSE PRACTITIONER

## 2025-08-14 ENCOUNTER — ANTI-COAG VISIT (OUTPATIENT)
Age: 75
End: 2025-08-14
Payer: MEDICARE

## 2025-08-14 VITALS
SYSTOLIC BLOOD PRESSURE: 93 MMHG | HEART RATE: 48 BPM | BODY MASS INDEX: 23.25 KG/M2 | DIASTOLIC BLOOD PRESSURE: 59 MMHG | WEIGHT: 186 LBS

## 2025-08-14 DIAGNOSIS — I48.21 PERMANENT ATRIAL FIBRILLATION (HCC): Primary | ICD-10-CM

## 2025-08-14 LAB
INTERNATIONAL NORMALIZATION RATIO, POC: 1.4
PROTHROMBIN TIME, POC: NORMAL

## 2025-08-14 PROCEDURE — 85610 PROTHROMBIN TIME: CPT

## 2025-08-14 PROCEDURE — 99211 OFF/OP EST MAY X REQ PHY/QHP: CPT

## 2025-08-14 RX ORDER — EPLERENONE 50 MG/1
50 TABLET ORAL DAILY
COMMUNITY
Start: 2025-07-24 | End: 2026-07-24

## 2025-08-22 ENCOUNTER — ANTI-COAG VISIT (OUTPATIENT)
Age: 75
End: 2025-08-22
Payer: MEDICARE

## 2025-08-22 VITALS
WEIGHT: 185 LBS | SYSTOLIC BLOOD PRESSURE: 107 MMHG | BODY MASS INDEX: 23.12 KG/M2 | DIASTOLIC BLOOD PRESSURE: 62 MMHG | HEART RATE: 59 BPM

## 2025-08-22 LAB
INTERNATIONAL NORMALIZATION RATIO, POC: 5.2
PROTHROMBIN TIME, POC: NORMAL

## 2025-08-22 PROCEDURE — 99211 OFF/OP EST MAY X REQ PHY/QHP: CPT | Performed by: PHARMACIST

## 2025-08-22 PROCEDURE — 85610 PROTHROMBIN TIME: CPT | Performed by: PHARMACIST

## 2025-08-25 ENCOUNTER — TELEPHONE (OUTPATIENT)
Dept: PHARMACY | Age: 75
End: 2025-08-25

## 2025-08-29 ENCOUNTER — ANTI-COAG VISIT (OUTPATIENT)
Age: 75
End: 2025-08-29
Payer: MEDICARE

## 2025-08-29 VITALS
DIASTOLIC BLOOD PRESSURE: 85 MMHG | WEIGHT: 187 LBS | SYSTOLIC BLOOD PRESSURE: 116 MMHG | BODY MASS INDEX: 23.37 KG/M2 | HEART RATE: 42 BPM

## 2025-08-29 LAB
INTERNATIONAL NORMALIZATION RATIO, POC: 1.5
PROTHROMBIN TIME, POC: NORMAL

## 2025-08-29 PROCEDURE — 99211 OFF/OP EST MAY X REQ PHY/QHP: CPT | Performed by: PHARMACIST

## 2025-08-29 PROCEDURE — 85610 PROTHROMBIN TIME: CPT | Performed by: PHARMACIST

## 2025-09-05 ENCOUNTER — ANTI-COAG VISIT (OUTPATIENT)
Age: 75
End: 2025-09-05
Payer: MEDICARE

## 2025-09-05 VITALS
SYSTOLIC BLOOD PRESSURE: 107 MMHG | DIASTOLIC BLOOD PRESSURE: 64 MMHG | BODY MASS INDEX: 22.87 KG/M2 | WEIGHT: 183.9 LBS | HEART RATE: 46 BPM | HEIGHT: 75 IN

## 2025-09-05 DIAGNOSIS — I48.91 ATRIAL FIBRILLATION, UNSPECIFIED TYPE (HCC): Primary | ICD-10-CM

## 2025-09-05 LAB
INTERNATIONAL NORMALIZATION RATIO, POC: 1.7
PROTHROMBIN TIME, POC: NORMAL

## 2025-09-05 PROCEDURE — 85610 PROTHROMBIN TIME: CPT | Performed by: FAMILY MEDICINE

## 2025-09-05 PROCEDURE — 99212 OFFICE O/P EST SF 10 MIN: CPT | Performed by: FAMILY MEDICINE
